# Patient Record
Sex: FEMALE | Race: WHITE | Employment: FULL TIME | URBAN - METROPOLITAN AREA
[De-identification: names, ages, dates, MRNs, and addresses within clinical notes are randomized per-mention and may not be internally consistent; named-entity substitution may affect disease eponyms.]

---

## 2017-02-13 ENCOUNTER — MYC MEDICAL ADVICE (OUTPATIENT)
Dept: FAMILY MEDICINE | Facility: CLINIC | Age: 32
End: 2017-02-13

## 2017-02-13 DIAGNOSIS — F41.9 ANXIETY: ICD-10-CM

## 2017-02-13 RX ORDER — LORAZEPAM 0.5 MG/1
0.5 TABLET ORAL EVERY 8 HOURS PRN
Qty: 15 TABLET | Refills: 0 | Status: SHIPPED | OUTPATIENT
Start: 2017-02-13 | End: 2017-08-29

## 2017-02-13 NOTE — TELEPHONE ENCOUNTER
JF,  Please advise on refill in SN's absence.  See below MyChart message.    Controlled Substance Refill Request for Ativan    Last refill: 9/13/2016 #15    Last clinic visit: 3/17/2016     Clinic visit frequency required: not documented  Next appt: none    Controlled substance agreement on file: No.    Documentation in problem list reviewed:  No, documentation started, needs to be completed    Processing:  [facility] Rx [facility] in [facility] pharmacy    RX monitoring program ([place]) reviewed:  reviewed- no concerns  [place] profile:  [email or web address]    Please authorize if appropriate.  Thanks,  Eve NOGUERA RN

## 2017-04-02 DIAGNOSIS — Z30.41 ENCOUNTER FOR SURVEILLANCE OF CONTRACEPTIVE PILLS: ICD-10-CM

## 2017-04-03 RX ORDER — ETONOGESTREL/ETHINYL ESTRADIOL .12-.015MG
RING, VAGINAL VAGINAL
Qty: 1 EACH | Refills: 0 | Status: SHIPPED | OUTPATIENT
Start: 2017-04-03 | End: 2017-08-29

## 2017-04-03 NOTE — TELEPHONE ENCOUNTER
Medication is being filled for 1 time refill only due to:  Patient needs to be seen because it has been more than one year since last visit.   Due for physical.  Last seen 3/17/16.  Kimber Bryant RN

## 2017-04-03 NOTE — TELEPHONE ENCOUNTER
Nuvaring       Last Written Prescription Date: 03/17/2016  Last Fill Quantity: 3 each,  # refills: 4   Last Office Visit with G, [facility] or [facility] prescribing provider: 03/17/2016

## 2017-08-07 ENCOUNTER — MYC MEDICAL ADVICE (OUTPATIENT)
Dept: FAMILY MEDICINE | Facility: CLINIC | Age: 32
End: 2017-08-07

## 2017-08-09 ENCOUNTER — OFFICE VISIT (OUTPATIENT)
Dept: FAMILY MEDICINE | Facility: CLINIC | Age: 32
End: 2017-08-09
Payer: COMMERCIAL

## 2017-08-09 ENCOUNTER — MYC MEDICAL ADVICE (OUTPATIENT)
Dept: FAMILY MEDICINE | Facility: CLINIC | Age: 32
End: 2017-08-09

## 2017-08-09 VITALS
HEART RATE: 85 BPM | RESPIRATION RATE: 18 BRPM | BODY MASS INDEX: 30.39 KG/M2 | SYSTOLIC BLOOD PRESSURE: 132 MMHG | HEIGHT: 64 IN | TEMPERATURE: 98 F | WEIGHT: 178 LBS | DIASTOLIC BLOOD PRESSURE: 86 MMHG | OXYGEN SATURATION: 99 %

## 2017-08-09 DIAGNOSIS — Z32.00 PREGNANCY EXAMINATION OR TEST, PREGNANCY UNCONFIRMED: Primary | ICD-10-CM

## 2017-08-09 DIAGNOSIS — Z32.01 PREGNANCY TEST POSITIVE: ICD-10-CM

## 2017-08-09 LAB — BETA HCG QUAL IFA URINE: POSITIVE

## 2017-08-09 PROCEDURE — 99214 OFFICE O/P EST MOD 30 MIN: CPT | Performed by: FAMILY MEDICINE

## 2017-08-09 PROCEDURE — 84703 CHORIONIC GONADOTROPIN ASSAY: CPT | Performed by: FAMILY MEDICINE

## 2017-08-09 NOTE — NURSING NOTE
"Chief Complaint   Patient presents with     Confirmation Of Pregnancy       Initial /86 (BP Location: Right arm, Patient Position: Chair, Cuff Size: Adult Regular)  Pulse 85  Temp 98  F (36.7  C) (Oral)  Resp 18  Ht 5' 4\" (1.626 m)  Wt 178 lb (80.7 kg)  LMP 07/03/2017  SpO2 99%  BMI 30.55 kg/m2 Estimated body mass index is 30.55 kg/(m^2) as calculated from the following:    Height as of this encounter: 5' 4\" (1.626 m).    Weight as of this encounter: 178 lb (80.7 kg).  Medication Reconciliation: complete   Hermila Liang CMA (AAMA)      "

## 2017-08-09 NOTE — PATIENT INSTRUCTIONS
Ruthie Acosta or any of the midwives through [facility] - [phone]    Schedule first OB visit at 10 weeks

## 2017-08-09 NOTE — PROGRESS NOTES
SUBJECTIVE:                                                    Yris Johnson is a 31 year old female who presents to clinic today for the following health issues:    Chief Complaint   Patient presents with     Confirmation Of Pregnancy     5 positive HPT     Has sure LMP  5+1 wga  CEM  4/10/2018    Feels ok - some nausea  No pain or bleeding  Slight cramps noted but mild  First pregnancy!  Excited  Interested in working with midwives  Taking PNV        Problem list and histories reviewed & adjusted, as indicated.  Additional history: as documented    Patient Active Problem List   Diagnosis     CARDIOVASCULAR SCREENING; LDL GOAL LESS THAN 160     Anxiety     Encounter for surveillance of contraceptive pills     GLENNA II (cervical intraepithelial neoplasia II)     Pregnancy test positive     Past Surgical History:   Procedure Laterality Date     HC TOOTH EXTRACTION W/FORCEP         Social History   Substance Use Topics     Smoking status: Never Smoker     Smokeless tobacco: Never Used     Alcohol use Yes      Comment: Once a week     Family History   Problem Relation Age of Onset     Asthma Father      DIABETES Paternal Grandfather      CEREBROVASCULAR DISEASE Paternal Grandfather      Asthma Sister      CANCER Paternal Aunt      CANCER Paternal Aunt          Current Outpatient Prescriptions   Medication Sig Dispense Refill     MAGNESIUM OXIDE PO        Prenatal MV-Min-Fe Fum-FA-DHA (PRENATAL 1 PO)        Cholecalciferol (VITAMIN D PO)        NUVARING 0.12-0.015 MG/24HR vaginal ring INSERT ONE RING VAGINALLY FOR THREE WEEKS AS DIRECTED. REMOVE AFTER ONE WEEK AND THEN REPEAT. (Patient not taking: Reported on 8/9/2017) 1 each 0     LORazepam (ATIVAN) 0.5 MG tablet Take 1 tablet (0.5 mg) by mouth every 8 hours as needed for anxiety (Patient not taking: Reported on 8/9/2017) 15 tablet 0     BP Readings from Last 3 Encounters:   08/09/17 132/86   03/17/16 116/88   03/05/15 120/86    Wt Readings from Last 3 Encounters:  "  08/09/17 178 lb (80.7 kg)   03/17/16 179 lb (81.2 kg)   03/05/15 181 lb (82.1 kg)                  Labs reviewed in EPIC        ROS:  Constitutional, HEENT, cardiovascular, pulmonary, gi and gu systems are negative, except as otherwise noted.      OBJECTIVE:                                                    /86 (BP Location: Right arm, Patient Position: Chair, Cuff Size: Adult Regular)  Pulse 85  Temp 98  F (36.7  C) (Oral)  Resp 18  Ht 5' 4\" (1.626 m)  Wt 178 lb (80.7 kg)  LMP 07/03/2017  SpO2 99%  BMI 30.55 kg/m2  Body mass index is 30.55 kg/(m^2).  GENERAL APPEARANCE: healthy, alert and no distress    Diagnostic test results:  Diagnostic Test Results:  Results for orders placed or performed in visit on 08/09/17 (from the past 24 hour(s))   Beta HCG qual IFA urine   Result Value Ref Range    Beta HCG Qual IFA Urine Positive (A) NEG          ASSESSMENT/PLAN:                                                    1. Pregnancy examination or test, pregnancy unconfirmed  reviewed PNC  Establishing care with CNMs  Can see me for co management if she desires  May look into out of clinic providers for delivery at birthing aris - encouraged her to make this decision soon and transfer care soon if this is what she would like  - Beta HCG qual IFA urine    2. Pregnancy test positive  reviewed PNC packet and signs and symptoms of warning      Follow up with Provider - first OB visit with Cnms     Kenia Witt MD  [facility]  "

## 2017-08-16 ENCOUNTER — TELEPHONE (OUTPATIENT)
Dept: MIDWIFE SERVICES | Facility: CLINIC | Age: 32
End: 2017-08-16

## 2017-08-16 ENCOUNTER — NURSE TRIAGE (OUTPATIENT)
Dept: NURSING | Facility: CLINIC | Age: 32
End: 2017-08-16

## 2017-08-16 NOTE — TELEPHONE ENCOUNTER
Patient is 6w2d, c/o spotting. Pt states that she was straining with BM last night and had some bright red spotting. This morning had some brown spotting when wiping. Discussed with patient most likely from straining with the BM. Discussed with patient to monitor for now and when to present to ED. Routing to on-call CNM as FYI. Any other recommendations? Thanks.   Maddy Quesada, RN-BSN

## 2017-08-16 NOTE — TELEPHONE ENCOUNTER
Reason for Disposition    [1] MODERATE vaginal bleeding (i.e., soaking 1 pad / hour; clots) AND [2] pregnant > 12 weeks    Additional Information    Negative: Followed an abdomen (stomach) injury    Negative: [1] Abdominal pain AND [2] pregnant > 20 weeks    Negative: MODERATE-SEVERE abdominal pain (e.g., interferes with normal activities, awakens from sleep)    Negative: [1] SEVERE vaginal bleeding (i.e., soaking 2 pads / hour, large blood clots) AND [2] present 2 or more hours    Negative: [1] MODERATE vaginal bleeding (i.e., soaking 1 pad / hour; clots) AND [2] present > 6 hours    Protocols used: PREGNANCY - ABDOMINAL PAIN LESS THAN 20 WEEKS EGA-ADULT-AH  No abdominal pain.

## 2017-08-16 NOTE — TELEPHONE ENCOUNTER
Yes, she can monitor for now. If she has more spotting or any cramping we can get beta HCG levels to monitor for pregnancy growth. Let me know if she needs anything else! Thanks! Danita

## 2017-08-17 ENCOUNTER — MYC MEDICAL ADVICE (OUTPATIENT)
Dept: FAMILY MEDICINE | Facility: CLINIC | Age: 32
End: 2017-08-17

## 2017-08-29 ENCOUNTER — PRENATAL OFFICE VISIT (OUTPATIENT)
Dept: NURSING | Facility: CLINIC | Age: 32
End: 2017-08-29
Payer: COMMERCIAL

## 2017-08-29 VITALS
TEMPERATURE: 98.1 F | BODY MASS INDEX: 29.02 KG/M2 | SYSTOLIC BLOOD PRESSURE: 133 MMHG | HEIGHT: 64 IN | DIASTOLIC BLOOD PRESSURE: 92 MMHG | HEART RATE: 118 BPM | WEIGHT: 170 LBS

## 2017-08-29 DIAGNOSIS — Z34.90 SUPERVISION OF NORMAL PREGNANCY: Primary | ICD-10-CM

## 2017-08-29 DIAGNOSIS — O26.859 SPOTTING IN PREGNANCY: ICD-10-CM

## 2017-08-29 DIAGNOSIS — Z86.59 HISTORY OF EATING DISORDER: ICD-10-CM

## 2017-08-29 LAB
ABO + RH BLD: NORMAL
ABO + RH BLD: NORMAL
ALBUMIN UR-MCNC: NEGATIVE MG/DL
APPEARANCE UR: CLEAR
BILIRUB UR QL STRIP: NEGATIVE
BLD GP AB SCN SERPL QL: NORMAL
BLOOD BANK CMNT PATIENT-IMP: NORMAL
COLOR UR AUTO: YELLOW
ERYTHROCYTE [DISTWIDTH] IN BLOOD BY AUTOMATED COUNT: 12 % (ref 10–15)
GLUCOSE UR STRIP-MCNC: NEGATIVE MG/DL
HBV SURFACE AG SERPL QL IA: NONREACTIVE
HCT VFR BLD AUTO: 40.4 % (ref 35–47)
HGB BLD-MCNC: 14.2 G/DL (ref 11.7–15.7)
HGB UR QL STRIP: NEGATIVE
HIV 1+2 AB+HIV1 P24 AG SERPL QL IA: NONREACTIVE
KETONES UR STRIP-MCNC: NEGATIVE MG/DL
LEUKOCYTE ESTERASE UR QL STRIP: NEGATIVE
MCH RBC QN AUTO: 30.6 PG (ref 26.5–33)
MCHC RBC AUTO-ENTMCNC: 35.1 G/DL (ref 31.5–36.5)
MCV RBC AUTO: 87 FL (ref 78–100)
NITRATE UR QL: NEGATIVE
PH UR STRIP: 5.5 PH (ref 5–7)
PLATELET # BLD AUTO: 243 10E9/L (ref 150–450)
RBC # BLD AUTO: 4.64 10E12/L (ref 3.8–5.2)
SOURCE: NORMAL
SP GR UR STRIP: 1.02 (ref 1–1.03)
SPECIMEN EXP DATE BLD: NORMAL
UROBILINOGEN UR STRIP-ACNC: 0.2 EU/DL (ref 0.2–1)
WBC # BLD AUTO: 5.7 10E9/L (ref 4–11)

## 2017-08-29 PROCEDURE — 86900 BLOOD TYPING SEROLOGIC ABO: CPT | Performed by: ADVANCED PRACTICE MIDWIFE

## 2017-08-29 PROCEDURE — 87389 HIV-1 AG W/HIV-1&-2 AB AG IA: CPT | Performed by: ADVANCED PRACTICE MIDWIFE

## 2017-08-29 PROCEDURE — 86850 RBC ANTIBODY SCREEN: CPT | Performed by: ADVANCED PRACTICE MIDWIFE

## 2017-08-29 PROCEDURE — 86901 BLOOD TYPING SEROLOGIC RH(D): CPT | Performed by: ADVANCED PRACTICE MIDWIFE

## 2017-08-29 PROCEDURE — 86762 RUBELLA ANTIBODY: CPT | Performed by: ADVANCED PRACTICE MIDWIFE

## 2017-08-29 PROCEDURE — 36415 COLL VENOUS BLD VENIPUNCTURE: CPT | Performed by: ADVANCED PRACTICE MIDWIFE

## 2017-08-29 PROCEDURE — 99207 ZZC NO CHARGE NURSE ONLY: CPT

## 2017-08-29 PROCEDURE — 81003 URINALYSIS AUTO W/O SCOPE: CPT | Performed by: ADVANCED PRACTICE MIDWIFE

## 2017-08-29 PROCEDURE — 87086 URINE CULTURE/COLONY COUNT: CPT | Performed by: ADVANCED PRACTICE MIDWIFE

## 2017-08-29 PROCEDURE — 87340 HEPATITIS B SURFACE AG IA: CPT | Performed by: ADVANCED PRACTICE MIDWIFE

## 2017-08-29 PROCEDURE — 86780 TREPONEMA PALLIDUM: CPT | Performed by: ADVANCED PRACTICE MIDWIFE

## 2017-08-29 PROCEDURE — 85027 COMPLETE CBC AUTOMATED: CPT | Performed by: ADVANCED PRACTICE MIDWIFE

## 2017-08-29 RX ORDER — LORAZEPAM 0.5 MG/1
TABLET ORAL
Refills: 0 | COMMUNITY
Start: 2017-02-14 | End: 2017-08-29

## 2017-08-29 NOTE — MR AVS SNAPSHOT
After Visit Summary   8/29/2017    Yris Johnson    MRN: [id number]           Patient Information     Date Of Birth          1985        Visit Information        Provider Department      8/29/2017 8:15 AM RD OB NURSE EDUCATION [facility]        Today's Diagnoses     Supervision of normal pregnancy    -  1    Spotting in pregnancy           Follow-ups after your visit        Your next 10 appointments already scheduled     Sep 06, 2017  2:20 PM CDT   US OB < 14 WEEKS SINGLE with RDUS1   [facility]    [address] [place] [id number] [phone]           Please bring a list of your medicines (including vitamins, minerals and over-the-counter drugs). Also, tell your doctor about any allergies you may have. Wear comfortable clothes and leave your valuables at home.  If you re less than 20 weeks drink four 8-ounce glasses of fluid an hour before your exam. If you need to empty your bladder before your exam, try to release only a little urine. Then, drink another glass of fluid.  You may have up to two family members in the exam room. If you bring a small child, an adult must be there to care for him or her.  Please call the Imaging Department at your exam site with any questions.            Sep 06, 2017  3:00 PM CDT   SHORT with MEME Nolasco CNM   [facility]    [address] [place] [id number] [phone]            Sep 19, 2017  3:15 PM CDT   New Prenatal with MEME Corral CNM   [facility]    [address] [place] [id number] [phone]              Future tests that were ordered for you today     Open Future Orders        Priority Expected Expires Ordered    US OB < 14 weeks, single,  for dating (GQL444) Routine  11/27/2017 8/29/2017           "  Who to contact     If you have questions or need follow up information about today's clinic visit or your schedule please contact [facility] directly at [phone].  Normal or non-critical lab and imaging results will be communicated to you by Skiipihart, letter or phone within 4 business days after the clinic has received the results. If you do not hear from us within 7 days, please contact the clinic through Skiipihart or phone. If you have a critical or abnormal lab result, we will notify you by phone as soon as possible.  Submit refill requests through [facility] or call your pharmacy and they will forward the refill request to us. Please allow 3 business days for your refill to be completed.          Additional Information About Your Visit        [facility] Information     [facility] gives you secure access to your electronic health record. If you see a primary care provider, you can also send messages to your care team and make appointments. If you have questions, please call your primary care clinic.  If you do not have a primary care provider, please call [phone] and they will assist you.        Care EveryWhere ID     This is your Care EveryWhere ID. This could be used by other organizations to access your [facility] medical records  [id number]        Your Vitals Were     Pulse Temperature Height Last Period BMI (Body Mass Index)       118 98.1  F (36.7  C) 5' 4\" (1.626 m) 07/03/2017 29.18 kg/m2        Blood Pressure from Last 3 Encounters:   08/29/17 (!) 133/92   08/09/17 132/86   03/17/16 116/88    Weight from Last 3 Encounters:   08/29/17 170 lb (77.1 kg)   08/09/17 178 lb (80.7 kg)   03/17/16 179 lb (81.2 kg)              We Performed the Following     ABO/RH Type and Screen     Anti Treponema     CBC with Platelets     Hepatitis B surface antigen     HIV Antigen Antibody Combo     Rubella Antibody IgG Quantitative     UA without Microscopic     Urine Culture Aerobic Bacterial        Primary " Care Provider Office Phone # Fax #    [place] [phone]       [address] [address]        Equal Access to Services     JUAN JOSE MARQUEZ : Hadii aad ku hadanjalio Socristinaali, waaxda luqadaha, qaybta kaalmada adeegyada, phani lucasclemente maurerelizabeth vega ro alicea. So [facility] [phone].    ATENCIÓN: Si habla español, tiene a schulz disposición servicios gratuitos de asistencia lingüística. Llame al [phone].    We comply with applicable federal civil rights laws and [place] laws. We do not discriminate on the basis of race, color, national origin, age, disability sex, sexual orientation or gender identity.            Thank you!     Thank you for choosing [facility]  for your care. Our goal is always to provide you with excellent care. Hearing back from our patients is one way we can continue to improve our services. Please take a few minutes to complete the written survey that you may receive in the mail after your visit with us. Thank you!             Your Updated Medication List - Protect others around you: Learn how to safely use, store and throw away your medicines at www.disposemymeds.org.          This list is accurate as of: 8/29/17  9:12 AM.  Always use your most recent med list.                   Brand Name Dispense Instructions for use Diagnosis    MAGNESIUM OXIDE PO           PRENATAL 1 PO
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
## 2017-08-30 LAB
BACTERIA SPEC CULT: NORMAL
RUBV IGG SERPL IA-ACNC: 25 IU/ML
SPECIMEN SOURCE: NORMAL
T PALLIDUM IGG+IGM SER QL: NEGATIVE

## 2017-09-06 ENCOUNTER — RADIANT APPOINTMENT (OUTPATIENT)
Dept: ULTRASOUND IMAGING | Facility: CLINIC | Age: 32
End: 2017-09-06
Attending: ADVANCED PRACTICE MIDWIFE
Payer: COMMERCIAL

## 2017-09-06 ENCOUNTER — OFFICE VISIT (OUTPATIENT)
Dept: MIDWIFE SERVICES | Facility: CLINIC | Age: 32
End: 2017-09-06
Attending: ADVANCED PRACTICE MIDWIFE
Payer: COMMERCIAL

## 2017-09-06 VITALS
BODY MASS INDEX: 30.04 KG/M2 | WEIGHT: 175 LBS | HEART RATE: 100 BPM | SYSTOLIC BLOOD PRESSURE: 133 MMHG | TEMPERATURE: 97.5 F | DIASTOLIC BLOOD PRESSURE: 88 MMHG

## 2017-09-06 DIAGNOSIS — Z34.01 ENCOUNTER FOR SUPERVISION OF NORMAL FIRST PREGNANCY IN FIRST TRIMESTER: Primary | ICD-10-CM

## 2017-09-06 DIAGNOSIS — Z34.90 SUPERVISION OF NORMAL PREGNANCY: ICD-10-CM

## 2017-09-06 PROCEDURE — 99207 ZZC PRENATAL VISIT: CPT | Performed by: ADVANCED PRACTICE MIDWIFE

## 2017-09-06 PROCEDURE — 76815 OB US LIMITED FETUS(S): CPT | Performed by: OBSTETRICS & GYNECOLOGY

## 2017-09-06 NOTE — NURSING NOTE
"Chief Complaint   Patient presents with     Prenatal Care       Initial /88  Pulse 100  Temp 97.5  F (36.4  C) (Oral)  Wt 175 lb (79.4 kg)  LMP 2017  BMI 30.04 kg/m2 Estimated body mass index is 30.04 kg/(m^2) as calculated from the following:    Height as of 17: 5' 4\" (1.626 m).    Weight as of this encounter: 175 lb (79.4 kg).  BP completed using cuff size: regular        The following HM Due: NONE      The following patient reported/Care Every where data was sent to:  P ABSTRACT QUALITY INITIATIVES [11762]  na     n/a             "

## 2017-09-06 NOTE — MR AVS SNAPSHOT
After Visit Summary   9/6/2017    Yris Johnson    MRN: [id number]           Patient Information     Date Of Birth          1985        Visit Information        Provider Department      9/6/2017 3:00 PM Jo Garrido APRN CNM [facility]        Today's Diagnoses     Encounter for supervision of normal first pregnancy in first trimester    -  1       Follow-ups after your visit        Your next 10 appointments already scheduled     Sep 19, 2017  3:15 PM CDT   New Prenatal with EMME Corral CNM   [facility] ([facility])    [address] [place] [address] [phone]              Who to contact     If you have questions or need follow up information about today's clinic visit or your schedule please contact [facility] directly at [phone].  Normal or non-critical lab and imaging results will be communicated to you by MyChart, letter or phone within 4 business days after the clinic has received the results. If you do not hear from us within 7 days, please contact the clinic through [employer] or phone. If you have a critical or abnormal lab result, we will notify you by phone as soon as possible.  Submit refill requests through [email or web address] or call your pharmacy and they will forward the refill request to us. Please allow 3 business days for your refill to be completed.          Additional Information About Your Visit        MyChart Information     [email or web address] gives you secure access to your electronic health record. If you see a primary care provider, you can also send messages to your care team and make appointments. If you have questions, please call your primary care clinic.  If you do not have a primary care provider, please call [phone] and they will assist you.        Care EveryWhere ID     This is your Care EveryWhere ID. This could be used by other organizations to access your  [place] medical records  [id number]        Your Vitals Were     Pulse Temperature Last Period BMI (Body Mass Index)          100 97.5  F (36.4  C) (Oral) 07/03/2017 30.04 kg/m2         Blood Pressure from Last 3 Encounters:   09/06/17 133/88   08/29/17 (!) 133/92   08/09/17 132/86    Weight from Last 3 Encounters:   09/06/17 175 lb (79.4 kg)   08/29/17 170 lb (77.1 kg)   08/09/17 178 lb (80.7 kg)              Today, you had the following     No orders found for display       Primary Care Provider Office Phone # Fax #    Kenia Witt [phone]       [address] [address]        Equal Access to Services     [facility] JIMMY : Hadii dana Harmon, waaxelana weinre, qaybta kaalmaelana ashraf, phani starr . So [facility] [phone].    ATENCIÓN: Si habla español, tiene a schulz disposición servicios gratuitos de asistencia lingüística. Deanna al [phone].    We comply with applicable federal civil rights laws and [place] laws. We do not discriminate on the basis of race, color, national origin, age, disability sex, sexual orientation or gender identity.            Thank you!     Thank you for choosing [facility]  for your care. Our goal is always to provide you with excellent care. Hearing back from our patients is one way we can continue to improve our services. Please take a few minutes to complete the written survey that you may receive in the mail after your visit with us. Thank you!             Your Updated Medication List - Protect others around you: Learn how to safely use, store and throw away your medicines at www.disposemymeds.org.          This list is accurate as of: 9/6/17  4:33 PM.  Always use your most recent med list.                   Brand Name Dispense Instructions for use Diagnosis    MAGNESIUM OXIDE PO           PRENATAL 1 PO

## 2017-09-10 ENCOUNTER — NURSE TRIAGE (OUTPATIENT)
Dept: NURSING | Facility: CLINIC | Age: 32
End: 2017-09-10

## 2017-09-10 NOTE — TELEPHONE ENCOUNTER
"  Additional Information    Negative: Shock suspected (e.g., cold/pale/clammy skin, too weak to stand, low BP, rapid pulse)    Negative: Difficult to awaken or acting confused  (e.g., disoriented, slurred speech)    Negative: Passed out (i.e., lost consciousness, collapsed and was not responding)    Negative: Sounds like a life-threatening emergency to the triager    Negative: [1] Vaginal bleeding AND [2] pregnant > 20 weeks    Negative: Not pregnant or pregnancy status unknown    Negative: SEVERE abdominal pain    Negative: [1] SEVERE vaginal bleeding (i.e., soaking 2 pads / hour, large blood clots) AND [2] present 2 or more hours    Negative: [1] MODERATE vaginal bleeding (i.e., soaking 1 pad / hour; clots) AND [2] present > 6 hours    Negative: [1] MODERATE vaginal bleeding (i.e., soaking 1 pad / hour; clots) AND [2] pregnant > 12 weeks    Negative: Passed tissue (e.g., gray-white)    Negative: Shoulder pain    Negative: Pale skin (pallor) of new onset or worsening    Negative: Patient sounds very sick or weak to the triager    Negative: [1] Constant abdominal pain AND [2] present > 2 hours    Negative: Fever > 100.4 F (38.0 C)    Negative: [1] Intermittent lower abdominal pain (e.g., cramping) AND [2] present > 24 hours    Negative: Prior history of \"ectopic pregnancy\" or previous tubal surgery (e.g., tubal ligation)    Negative: Burning with urination    Negative: Has IUD    Negative: MILD vaginal bleeding (i.e., clots or similar to menstrual period; not just spotting)    Negative: SPOTTING lasts > 48 hours or spotting happens more than once in a week    Negative: Unusual vaginal discharge (e.g., bad smelling, yellow, green, or foamy-white)    Negative: Not feeling pregnant any longer (e.g., breast tenderness or nausea has disappeared)    Negative: SPOTTING after sexual intercourse (single or brief episode) (all triage questions negative)    Negative: SPOTTING after a pelvic examination (single or brief " "episode) (all triage questions negative)    SPOTTING (single or brief episode) (all triage questions negative)     \"I just noticed after I went to the bath room(after a bowel movement) there was some dark brown blood(size of a quarter) when I wiped. NO pain or anything. I had my US last week and things were good.\" Denies other sx. Triaged, gave home care advice, call back if needed. Pt. Is leaving [facility] to go out of the [place] for one week.    Protocols used: PREGNANCY - VAGINAL BLEEDING LESS THAN 20 WEEKS [id number]    "

## 2017-09-19 ENCOUNTER — PRENATAL OFFICE VISIT (OUTPATIENT)
Dept: MIDWIFE SERVICES | Facility: CLINIC | Age: 32
End: 2017-09-19
Payer: COMMERCIAL

## 2017-09-19 VITALS
BODY MASS INDEX: 29.7 KG/M2 | SYSTOLIC BLOOD PRESSURE: 130 MMHG | HEART RATE: 87 BPM | TEMPERATURE: 98 F | DIASTOLIC BLOOD PRESSURE: 86 MMHG | WEIGHT: 173 LBS

## 2017-09-19 DIAGNOSIS — Z34.01 ENCOUNTER FOR SUPERVISION OF NORMAL FIRST PREGNANCY IN FIRST TRIMESTER: Primary | ICD-10-CM

## 2017-09-19 PROCEDURE — 99207 ZZC FIRST OB VISIT: CPT | Performed by: ADVANCED PRACTICE MIDWIFE

## 2017-09-19 NOTE — MR AVS SNAPSHOT
After Visit Summary   9/19/2017    Yris Johnson    MRN: [id number]           Patient Information     Date Of Birth          1985        Visit Information        Provider Department      9/19/2017 3:15 PM Ary Izquierdo APRN CNM [facility]        Today's Diagnoses     Encounter for supervision of normal first pregnancy in first trimester    -  1       Follow-ups after your visit        Your next 10 appointments already scheduled     Oct 18, 2017 10:45 AM CDT   ESTABLISHED PRENATAL with MEME Anderson CNM   [facility] ([facility])    [address] [place] [address] [phone]              Who to contact     If you have questions or need follow up information about today's clinic visit or your schedule please contact [facility] directly at [phone].  Normal or non-critical lab and imaging results will be communicated to you by MyChart, letter or phone within 4 business days after the clinic has received the results. If you do not hear from us within 7 days, please contact the clinic through [employer] or phone. If you have a critical or abnormal lab result, we will notify you by phone as soon as possible.  Submit refill requests through [employer] or call your pharmacy and they will forward the refill request to us. Please allow 3 business days for your refill to be completed.          Additional Information About Your Visit        MyChart Information     [employer] gives you secure access to your electronic health record. If you see a primary care provider, you can also send messages to your care team and make appointments. If you have questions, please call your primary care clinic.  If you do not have a primary care provider, please call [phone] and they will assist you.        Care EveryWhere ID     This is your Care EveryWhere ID. This could be used by other organizations to access  your [place] medical records  [id number]        Your Vitals Were     Pulse Temperature Last Period BMI (Body Mass Index)          87 98  F (36.7  C) (Oral) 07/03/2017 29.7 kg/m2         Blood Pressure from Last 3 Encounters:   09/19/17 130/86   09/06/17 133/88   08/29/17 (!) 133/92    Weight from Last 3 Encounters:   09/19/17 173 lb (78.5 kg)   09/06/17 175 lb (79.4 kg)   08/29/17 170 lb (77.1 kg)              Today, you had the following     No orders found for display       Primary Care Provider Office Phone # Fax #    Kenia Witt [phone]       [address] [address]        Equal Access to Services     JUAN JOSE MARQUEZ : Hadii dana walter Sojaqueline, waaxda luqadaha, qaybta kaalmada andriy, phani starr . So [facility] [phone].    ATENCIÓN: Si habla español, tiene a schulz disposición servicios gratuitos de asistencia lingüística. Deanna al [phone].    We comply with applicable federal civil rights laws and [place] laws. We do not discriminate on the basis of race, color, national origin, age, disability sex, sexual orientation or gender identity.            Thank you!     Thank you for choosing [facility]  for your care. Our goal is always to provide you with excellent care. Hearing back from our patients is one way we can continue to improve our services. Please take a few minutes to complete the written survey that you may receive in the mail after your visit with us. Thank you!             Your Updated Medication List - Protect others around you: Learn how to safely use, store and throw away your medicines at www.disposemymeds.org.          This list is accurate as of: 9/19/17  4:39 PM.  Always use your most recent med list.                   Brand Name Dispense Instructions for use Diagnosis    MAGNESIUM OXIDE PO           PRENATAL 1 PO

## 2017-09-19 NOTE — NURSING NOTE
"Chief Complaint   Patient presents with     Prenatal Care       Initial /86  Pulse 87  Temp 98  F (36.7  C) (Oral)  Wt 173 lb (78.5 kg)  LMP 2017  BMI 29.7 kg/m2 Estimated body mass index is 29.7 kg/(m^2) as calculated from the following:    Height as of 17: 5' 4\" (1.626 m).    Weight as of this encounter: 173 lb (78.5 kg).  BP completed using cuff size: regular        The following HM Due: NONE      The following patient reported/Care Every where data was sent to:  P ABSTRACT QUALITY INITIATIVES [35336]  na     n/a             "

## 2017-09-19 NOTE — PROGRESS NOTES
Patient here with  for NOB visit. Just returned from [place], had some brown spotting while there. Anxious to hear FHTs, very relieved and excited when we were able to hear them. Reviewed CNM service, schedule of visits. Not interested in SW. Lots of questions about activity - reviewed precautions. Elevated BP at first visit, patient will be monitoring at work. Due for pap but declined in light of spotting. Will complete at PP visit. RTC in 4 weeks. MML    11w1d   Yris Johnson is a 32 year old who presents to the clinic for an new ob visit. She is not a previous CNM patient.  Estimated Date of Delivery: Apr 9, 2018 is calculated from Patient's last menstrual period was 07/03/2017.     She has had bleeding since her LMP.  The bleeding  was brown, in small, on a few occasions, and was not accompanied by cramping...   She has not had nausea. Weigh loss has not occurred.   This was a planned pregnancy.   FOB is involved,  Present and supportive   OTHER CONCERNS:    INFECTION HISTORY  HIV: no  Hepatitis B: no  Hepatitis C: no  Syphilis:  no  Tuberculosis: no   PPD- no  Herpes self: no  Herpes partner:  no  Chlamydia:  no  Gonorrhea:  no  HPV: no  BV:  no  Trichomonis:  no  Chicken Pox:  YES  ====================================================  GENETIC SCREENING  Genetic screening reviewed. High Risk? no  ====================================================  PERSONAL/SOCIAL HISTORY  Lives lives with their spouse.  Employment: Full time.  Her job involves light activity .  HX OF ABUSE: no  =====================================================   REVIEW OF SYSTEMS  C: NEGATIVE for fever, chills  E: NEGATIVE for vision changes   R: NEGATIVE for significant cough or SOB  CV: NEGATIVE for chest pain, palpitations   GI: NEGATIVE for nausea, abdominal pain, heartburn, or change in bowel habits  : NEGATIVE for frequency, dysuria, or hematuria  M: NEGATIVE for significant arthralgias or myalgia  N: NEGATIVE for weakness,  dizziness or paresthesias or headache  ====================================================    PHYSICAL EXAM:  /86  Pulse 87  Temp 98  F (36.7  C) (Oral)  Wt 173 lb (78.5 kg)  LMP 2017  BMI 29.7 kg/m2  BMI- Body mass index is 29.7 kg/(m^2).,     RECOMMENDED WEIGHT GAIN: 15-25 lbs.  PHQ9- Today's Depression Rating was No Value exists for the : HP#PHQ9  GENERAL:  Pleasant pregnant female, alert, well groomed.   SKIN:  Warm and dry, without lesions or rashes  HEAD: Symmetrical features.  NECK:  Thyroid without enlargement and nodules.  Lymph nodes not palpable.   LUNGS:  Clear to auscultation.  HEART:  RRR without murmur.  ABDOMEN: Soft without masses , tenderness or organomegaly.  No CVA tenderness. No scars noted.     MUSCULOSKELETAL:  Full range of motion  EXTREMITIES:  No edema. No significant varicosities.   =========================================  ASSESSMENT:  11w1d  No diagnosis found.  ==========================================  PLAN:  Instructed on use of triage nurse line and contacting the on call CNM after hours for an urgent need such as fever, vagina bleeding, bladder or vaginal infection, rupture of membranes,  or term labor.    Discussed the indications, uses for and false positives for quad screen, nuchal translucency and fetal survey ultrasound at 18-20 weeks gestation. Will inform us at the next visit if she wished to avail herself of these screens.  Instructed on best evidence for: weight gain for her BMI for pregnancy; healthy diet and foods to avoid; exercise and activity during pregnancy;avoiding exposure to toxoplasmosis; and maintenance of a generally healthy lifestyle.   Discussed the harms, benefits, side effects and alternative therapies for current prescribed and OTC medications.  MEME Corral CNM

## 2017-10-09 DIAGNOSIS — Z34.92 SUPERVISION OF NORMAL PREGNANCY IN SECOND TRIMESTER: Primary | ICD-10-CM

## 2017-10-15 ENCOUNTER — HEALTH MAINTENANCE LETTER (OUTPATIENT)
Age: 32
End: 2017-10-15

## 2017-10-18 ENCOUNTER — PRENATAL OFFICE VISIT (OUTPATIENT)
Dept: MIDWIFE SERVICES | Facility: CLINIC | Age: 32
End: 2017-10-18
Payer: COMMERCIAL

## 2017-10-18 VITALS
BODY MASS INDEX: 30.04 KG/M2 | WEIGHT: 175 LBS | OXYGEN SATURATION: 98 % | TEMPERATURE: 97 F | SYSTOLIC BLOOD PRESSURE: 110 MMHG | HEART RATE: 94 BPM | DIASTOLIC BLOOD PRESSURE: 84 MMHG

## 2017-10-18 DIAGNOSIS — Z34.02 ENCOUNTER FOR SUPERVISION OF NORMAL FIRST PREGNANCY IN SECOND TRIMESTER: ICD-10-CM

## 2017-10-18 PROBLEM — Z32.01 PREGNANCY TEST POSITIVE: Status: RESOLVED | Noted: 2017-08-09 | Resolved: 2017-10-18

## 2017-10-18 PROCEDURE — 99207 ZZC PRENATAL VISIT: CPT | Performed by: ADVANCED PRACTICE MIDWIFE

## 2017-10-18 ASSESSMENT — PATIENT HEALTH QUESTIONNAIRE - PHQ9: SUM OF ALL RESPONSES TO PHQ QUESTIONS 1-9: 0

## 2017-10-18 NOTE — PROGRESS NOTES
15w2d  Doing well, good questions.   Has U./S scheduled already for 3 weeks.  Does not want to know weight.   PHQ9= 0.  Anxious. Hired : Laura.   name: Chris.   049 study.  rtc in 3 weeks with U/S.  mrb

## 2017-10-18 NOTE — MR AVS SNAPSHOT
After Visit Summary   10/18/2017    Yris Johnson    MRN: [id number]           Patient Information     Date Of Birth          1985        Visit Information        Provider Department      10/18/2017 10:45 AM Agnes Zhang APRN CNM [facility]        Today's Diagnoses     Encounter for supervision of normal first pregnancy in second trimester           Follow-ups after your visit        Your next 10 appointments already scheduled     Nov 06, 2017  7:40 AM CST   US OB > 14 WEEKS COMPLETE SINGLE with RDUS1   [facility] ([facility])    [address] [place] [address] [address]  [facility] [address] [phone]           Please bring a list of your medicines (including vitamins, minerals and over-the-counter drugs). Also, tell your doctor about any allergies you may have. Wear comfortable clothes and leave your valuables at home.  If you re less than 20 weeks drink four 8-ounce glasses of fluid an hour before your exam. If you need to empty your bladder before your exam, try to release only a little urine. Then, drink another glass of fluid.  You may have up to two family members in the exam room. If you bring a small child, an adult must be there to care for him or her.  Please call the Imaging Department at your exam site with any questions.            Nov 06, 2017  8:45 AM CST   ESTABLISHED PRENATAL with MEME Nolasco CNM   [facility] ([facility])    [address] [place] [address]  [address]  [facility] [address] [phone]              Who to contact     If you have questions or need follow up information about today's clinic visit or your schedule please contact [facility] directly at [phone].  Normal or non-critical lab and imaging results will be communicated to you by MyChart, letter or phone within 4 business days after the clinic has received the results. If you do not hear from  us within 7 days, please contact the clinic through [employer] or phone. If you have a critical or abnormal lab result, we will notify you by phone as soon as possible.  Submit refill requests through [employer] or call your pharmacy and they will forward the refill request to us. Please allow 3 business days for your refill to be completed.          Additional Information About Your Visit        [employer] Information     [employer] gives you secure access to your electronic health record. If you see a primary care provider, you can also send messages to your care team and make appointments. If you have questions, please call your primary care clinic.  If you do not have a primary care provider, please call [phone] and they will assist you.        Care EveryWhere ID     This is your Care EveryWhere ID. This could be used by other organizations to access your [place] medical records  [id number]        Your Vitals Were     Pulse Temperature Last Period Pulse Oximetry BMI (Body Mass Index)       94 97  F (36.1  C) (Oral) 07/03/2017 98% 30.04 kg/m2        Blood Pressure from Last 3 Encounters:   10/18/17 110/84   09/19/17 130/86   09/06/17 133/88    Weight from Last 3 Encounters:   10/18/17 175 lb (79.4 kg)   09/19/17 173 lb (78.5 kg)   09/06/17 175 lb (79.4 kg)              Today, you had the following     No orders found for display       Primary Care Provider Office Phone # Fax #    [place]ocquaBrooklynn Witt [phone]       [address] [address]        Equal Access to Services     [facility] : Hadii aad ku hadasho Soomaali, waaxda luqadaha, qaybta kaalmada andriy, waxay adriana starr . So [facility] [phone].    ATENCIÓN: Si habla español, tiene a schulz disposición servicios gratuitos de asistencia lingüística. Llame al [phone].    We comply with applicable federal civil rights laws and [place] laws. We do not discriminate on the basis of race, color,  national origin, age, disability, sex, sexual orientation, or gender identity.            Thank you!     Thank you for choosing [facility]  for your care. Our goal is always to provide you with excellent care. Hearing back from our patients is one way we can continue to improve our services. Please take a few minutes to complete the written survey that you may receive in the mail after your visit with us. Thank you!             Your Updated Medication List - Protect others around you: Learn how to safely use, store and throw away your medicines at www.disposemymeds.org.          This list is accurate as of: 10/18/17 11:10 AM.  Always use your most recent med list.                   Brand Name Dispense Instructions for use Diagnosis    MAGNESIUM OXIDE PO           PRENATAL 1 PO

## 2017-10-28 ENCOUNTER — TELEPHONE (OUTPATIENT)
Dept: OBGYN | Facility: CLINIC | Age: 32
End: 2017-10-28

## 2017-10-28 ENCOUNTER — NURSE TRIAGE (OUTPATIENT)
Dept: NURSING | Facility: CLINIC | Age: 32
End: 2017-10-28

## 2017-10-28 NOTE — TELEPHONE ENCOUNTER
Reason for Disposition    [1] SEVERE vomiting (e.g., 6 or more times/day) AND [2] present > 8 hours    Protocols used: VOMITING-ADULT-      Paged on call URSULA Patel via [employer] at 3:52 pm Call pt, Yris Johnson, [phone], [birth year], MRN [id number], 16 weeks pregnant, woke up with severe stomach flu with severe vomiting and diarrhea, declined ER disposition and requested page. Thanks! She is aware to call FNA back if no call in 20-30 minutes for repage or go to ER.    Radha Sosa, RN, BSN  [facility]

## 2017-10-28 NOTE — TELEPHONE ENCOUNTER
Call to pt has had watery diarrhea and hourly vomiting since about 8 am this morning.  Is unsure if she has been urinating with the stool or not, states she feel slike she can keep down very small sips of water, but if has more than a sip then vomits again.    Discuss warning s/s of dehydration, triggers for coming into the ER, if pt goes for 12 hours or more without being able to keep anything down, feels lightheaded, dizzy, or is still unable to tell if she has to urinate by about 8 pm then she should go to the ER.    Pt stated understanding and agreement with plan.    Alexsandra MAYBERRY

## 2017-11-06 ENCOUNTER — RADIANT APPOINTMENT (OUTPATIENT)
Dept: ULTRASOUND IMAGING | Facility: CLINIC | Age: 32
End: 2017-11-06
Attending: ADVANCED PRACTICE MIDWIFE
Payer: COMMERCIAL

## 2017-11-06 ENCOUNTER — PRE VISIT (OUTPATIENT)
Dept: MATERNAL FETAL MEDICINE | Facility: CLINIC | Age: 32
End: 2017-11-06

## 2017-11-06 ENCOUNTER — PRENATAL OFFICE VISIT (OUTPATIENT)
Dept: MIDWIFE SERVICES | Facility: CLINIC | Age: 32
End: 2017-11-06
Attending: ADVANCED PRACTICE MIDWIFE
Payer: COMMERCIAL

## 2017-11-06 VITALS
TEMPERATURE: 96.7 F | SYSTOLIC BLOOD PRESSURE: 120 MMHG | BODY MASS INDEX: 30.04 KG/M2 | HEART RATE: 113 BPM | WEIGHT: 175 LBS | DIASTOLIC BLOOD PRESSURE: 82 MMHG

## 2017-11-06 DIAGNOSIS — Z34.02 ENCOUNTER FOR SUPERVISION OF NORMAL FIRST PREGNANCY IN SECOND TRIMESTER: Primary | ICD-10-CM

## 2017-11-06 DIAGNOSIS — Z34.92 SUPERVISION OF NORMAL PREGNANCY IN SECOND TRIMESTER: ICD-10-CM

## 2017-11-06 PROCEDURE — 99207 ZZC PRENATAL VISIT: CPT | Performed by: ADVANCED PRACTICE MIDWIFE

## 2017-11-06 PROCEDURE — 76805 OB US >/= 14 WKS SNGL FETUS: CPT | Performed by: OBSTETRICS & GYNECOLOGY

## 2017-11-06 NOTE — MR AVS SNAPSHOT
After Visit Summary   11/6/2017    Yris Johnson    MRN: [id number]           Patient Information     Date Of Birth          1985        Visit Information        Provider Department      11/6/2017 8:45 AM Jo Garrido APRN CNM [facility]        Today's Diagnoses     Encounter for supervision of normal first pregnancy in second trimester    -  1       Follow-ups after your visit        Additional Services     MAT FETAL MED CTR REFERRAL-PREGNANCY       >> Patient may proceed with recommendations for further testing as directed by the Maternal Fetal Medicine Specialist >>    >> If requesting Fetal Echo: MFM will determine appropriate location for exam due to indication.    >> If requesting Lung Maturity Amnio:  If results indicate fetal lung maturity, induction or C/S is recommended within 36 hours.  Please schedule accordingly.     Dear Patient:   Please be aware that coverage of these services is subject to the terms and limitations of your health insurance plan.  Call member services at your health plan with any benefit or coverage questions.      Please bring the following to your appointment:    >>  Any x-rays, CTs or MRIs which have been performed.  Contact the facility where they were done to arrange for  prior to your scheduled appointment.  Any new CT, MRI or other procedures ordered by your specialist must be performed at a [place] facility or coordinated by your clinic's referral office.  >>  List of current medications   >>  This referral request   >>  Any documents/labs given to you for this referral                  Who to contact     If you have questions or need follow up information about today's clinic visit or your schedule please contact [facility] directly at [phone].  Normal or non-critical lab and imaging results will be communicated to you by MyChart, letter or phone within 4 business days after the clinic has received the  results. If you do not hear from us within 7 days, please contact the clinic through [employer] or phone. If you have a critical or abnormal lab result, we will notify you by phone as soon as possible.  Submit refill requests through [employer] or call your pharmacy and they will forward the refill request to us. Please allow 3 business days for your refill to be completed.          Additional Information About Your Visit        [employer] Information     [employer] gives you secure access to your electronic health record. If you see a primary care provider, you can also send messages to your care team and make appointments. If you have questions, please call your primary care clinic.  If you do not have a primary care provider, please call [phone] and they will assist you.        Care EveryWhere ID     This is your Care EveryWhere ID. This could be used by other organizations to access your [place] medical records  [id number]        Your Vitals Were     Pulse Temperature Last Period BMI (Body Mass Index)          113 96.7  F (35.9  C) (Oral) 07/03/2017 30.04 kg/m2         Blood Pressure from Last 3 Encounters:   11/06/17 120/82   10/18/17 110/84   09/19/17 130/86    Weight from Last 3 Encounters:   11/06/17 175 lb (79.4 kg)   10/18/17 175 lb (79.4 kg)   09/19/17 173 lb (78.5 kg)              We Performed the Following     MAT FETAL MED CTR REFERRAL-PREGNANCY        Primary Care Provider Office Phone # Fax #    [place]Bro[place]lynn Witt [phone] [address] [address]        Equal Access to Services     : Hadii aad herlinda hadashab Sojaqueline, waaxda luqadaha, qaybta kaalmaphani mendiola . So [facility] [phone].    ATENCIÓN: Si habla español, tiene a schulz disposición servicios gratuitos de asistencia lingüística. Llame al [phone].    We comply with applicable federal civil rights laws and [place] laws. We do not discriminate  on the basis of race, color, national origin, age, disability, sex, sexual orientation, or gender identity.            Thank you!     Thank you for choosing [facility]  for your care. Our goal is always to provide you with excellent care. Hearing back from our patients is one way we can continue to improve our services. Please take a few minutes to complete the written survey that you may receive in the mail after your visit with us. Thank you!             Your Updated Medication List - Protect others around you: Learn how to safely use, store and throw away your medicines at www.disposemymeds.org.          This list is accurate as of: 11/6/17  9:06 AM.  Always use your most recent med list.                   Brand Name Dispense Instructions for use Diagnosis    MAGNESIUM OXIDE PO           PRENATAL 1 PO

## 2017-11-06 NOTE — PROGRESS NOTES
18w0d  Patient feeling well. Denies any vaginal bleeding, water leaking, abdominal pain, or other concerns.   Discussed genetic testing. Discussed fetal survey ultrasound today, showed right choroid plexus cyst and low lying placenta. Ordered [facility] ultrasound follow up.   Flu vaccination today.   Danger signs discussed. Patient knows when to call triage and has numbers to call.   Follow up in 6 weeks for GCT and HGB.   Jo Garrido CNM

## 2017-11-08 ENCOUNTER — OFFICE VISIT (OUTPATIENT)
Dept: MATERNAL FETAL MEDICINE | Facility: CLINIC | Age: 32
End: 2017-11-08
Attending: ADVANCED PRACTICE MIDWIFE
Payer: COMMERCIAL

## 2017-11-08 ENCOUNTER — HOSPITAL ENCOUNTER (OUTPATIENT)
Dept: ULTRASOUND IMAGING | Facility: CLINIC | Age: 32
Discharge: HOME | End: 2017-11-08
Attending: ADVANCED PRACTICE MIDWIFE | Admitting: OBSTETRICS & GYNECOLOGY
Payer: COMMERCIAL

## 2017-11-08 DIAGNOSIS — O26.90 PREGNANCY RELATED CONDITION, ANTEPARTUM: ICD-10-CM

## 2017-11-08 DIAGNOSIS — O35.03X0 CHOROID PLEXUS CYST OF FETUS AFFECTING CARE OF MOTHER, ANTEPARTUM, SINGLE OR UNSPECIFIED FETUS: Primary | ICD-10-CM

## 2017-11-08 PROCEDURE — 76811 OB US DETAILED SNGL FETUS: CPT

## 2017-11-08 PROCEDURE — 40000791 ZZHCL STATISTIC VERIFI PRENATAL TRISOMY 21,18,13: Performed by: OBSTETRICS & GYNECOLOGY

## 2017-11-08 PROCEDURE — 96040 ZZH GENETIC COUNSELING, EACH 30 MINUTES: CPT | Performed by: GENETIC COUNSELOR, MS

## 2017-11-08 PROCEDURE — 40000072 ZZH STATISTIC GENETIC COUNSELING, < 16 MIN: Mod: ZF | Performed by: GENETIC COUNSELOR, MS

## 2017-11-08 PROCEDURE — 36415 COLL VENOUS BLD VENIPUNCTURE: CPT | Performed by: OBSTETRICS & GYNECOLOGY

## 2017-11-08 NOTE — PROGRESS NOTES
[facility]  Genetic Counseling Consult    Patient: Yris Johnson YOB: 1985       Yris Johnson was seen at the [facility] for genetic consultation as part of her appointment for comprehensive ultrasound.  The indication for genetic counseling is outside ultrasound finding of choroid plexus cyst.       Impression/Plan:   1. Yris had an 18 week ultrasound which note a single right sided choroid plexus cyst and was referred to [facility] for a follow up ultrasound and genetic counseling appointment to discuss testing options.    2. Yris had a comprehensive (level II) ultrasound today, which confirmed the finding of CPC and was otherwise normal.  Please see the ultrasound report for further details.    3. The patient had a blood draw for NIPT (Innatal test through [email or web address]).  Results are expected within 7-10 days, and will be available in [employer].  We will contact her at the number she provided [phone] to discuss the results, and detailed results will be left in her voicemail if she cannot be reached.  A copy will be forwarded to the office of the referring OB provider.    Pregnancy History:   /Parity:    Age at Delivery: 32 year old  CEM: 2018, by Last Menstrual Period  Gestational Age: 18w2d    No significant complications or exposures were reported in the current pregnancy.    Medical History:   Yris marie reported medical history is not expected to impact pregnancy management or risks to fetal development.       Family History:   A three-generation pedigree was obtained, and is scanned under the  Media  tab.   The following significant findings were reported by Yris:    Paternal uncle  in infancy from reported birth injury/cerebral palsy.  There are many things that can result in a infant dying, including complications from delivery.  Cerebral palsy resulting from a birth complication in a family  member is not expected to alter risks for Yris's pregnancy.     There are no other reported significant health findings in either Yris or her 's family history.      Otherwise, the reported family history is negative for multiple miscarriages, stillbirths, birth defects, cognitive impairment, known genetic conditions, and consanguinity.       Carrier Screening:   The patient reports that she is of  ancestry:      Cystic fibrosis is an autosomal recessive genetic condition that occurs with increased frequency in individuals of  ancestry and carrier screening for this condition is available.  In addition,  screening in the [facility] includes cystic fibrosis.      Expanded carrier screening for mutations in a large panel of genes associated with autosomal recessive conditions including cystic fibrosis, spinal muscular atrophy, and others, is now available.      Carrier screening was not discussed today given the circumstances but is an option that is available to all individuals who are pregnant or considering pregnancy.            Discussion:     The majority of today's appointment was spent discussion choroid plexus cysts and risks associated with them.  CPCs are small fluid filled cysts in the part of the brain that is responsible for making cerebrospinal fluid.  CPCs do not cause problems, and typically disappear as pregnancy progresses.  CPCs are seen in approximately 1% of pregnancies.  Rarely, CPCs are seen in pregnancies that have a genetic problem called a chromosome abnormality, with Trisomy 18 being the most commonly associated condition.  When CPCs are seen with other markers, or indicators, for Trisomy 18, they increase the likelihood that a pregnancy might be affected with that condition.  When CPCs are seen as an isolated finding, meaning there are no other markers for Trisomy 18, the change in risk for trisomy 18 is less clear, with evidence suggesting that  isolated CPC does not impact risk for trisomy 18 at all.  We would not consider Yris as high risk for her pregnancy to be affected with Trisomy 18 based only on the finding of a CPC.  However, chromosome abnormalities can occur in any pregnancy, and we discussed that the major risk factor for these conditions is maternal age.  Based on maternal age, we discussed the following risk numbers:     - At age 32 at midtrimester, the risk to have a baby with Down syndrome is 1 in 597.  - At age 32 at midtrimester, the risk to have a baby with any chromosome abnormality is 1 in 299.   - At age 32 at delivery, the risk to have a baby with Down syndrome is 1 in 909.   - At age 32 at delivery, the risk to have a baby with any chromosome abnormality is 1 in 355.     Yris's ultrasound today noted no other markers or abnormalities associated with chromosome abnormalities.  Yris has not had any serum screening for aneuploidy to this point in pregnancy, and we discussed that additional testing is a way to get more information for individuals who are concerned about these conditions.     We discussed the following options:   Non-invasive Prenatal Testing (NIPT)    Maternal plasma cell-free DNA testing; first trimester ultrasound with nuchal translucency and nasal bone assessment is recommended, when appropriate    Screens for fetal trisomy 21, trisomy 13, trisomy 18, and sex chromosome aneuploidy    Cannot screen for open neural tube defects; maternal serum AFP after 15 weeks is recommended     Genetic Amniocentesis    Invasive procedure typically performed in the second trimester by which amniotic fluid is obtained for the purpose of chromosome analysis and/or other prenatal genetic analysis    Diagnostic results; >99% sensitivity for fetal chromosome abnormalities    AFAFP measurement tests for open neural tube defects     Comprehensive (Level II) ultrasound:     Detailed ultrasound performed between 18-22 weeks  gestation.    Screen for major birth defects and markers for aneuploidy.    We reviewed the benefits and limitations of this testing.  Screening tests provide a risk assessment specific to the pregnancy for certain fetal chromosome abnormalities, but cannot definitively diagnose or exclude a fetal chromosome abnormality.  Follow-up genetic counseling and consideration of diagnostic testing is recommended with any abnormal screening result.     Diagnostic tests carry inherent risks- including risk of miscarriage- that require careful consideration.  These tests can detect fetal chromosome abnormalities with greater than 99% certainty.  Results can be compromised by maternal cell contamination or mosaicism, and are limited by the resolution of cytogenetic G-banding technology.  There is no screening nor diagnostic test that can detect all forms of birth defects or mental disability.    At the conclusion of our discussion today, Yris felt that screening for chromosome abnormalities via NIPT would be beneficial for her, as she desires more information regarding risks to her ongoing pregnancy.  Blood as drawn for NIPT at the conclusion of Yris's appointments today.  Please see impression/plan section for results disclosure plan.       It was a pleasure to be involved with Yris s care. Face-to-face time of the meeting was 35 minutes.      Tera Domínguez MS, [facility]  Certified Genetic Counselor  Phone: [phone]  Pager: [phone]

## 2017-11-08 NOTE — MR AVS SNAPSHOT
After Visit Summary   11/8/2017    Yris Johnson    MRN: [id number]           Patient Information     Date Of Birth          1985        Visit Information        Provider Department      11/8/2017 10:45 AM Ruddy Chavez MD [facility] Maternal Fetal Medicine [facility]        Today's Diagnoses     Choroid plexus cyst of fetus affecting care of mother, antepartum, single or unspecified fetus    -  1       Follow-ups after your visit        Who to contact     If you have questions or need follow up information about today's clinic visit or your schedule please contact [facility] MATERNAL FETAL MEDICINE [facility] directly at [phone].  Normal or non-critical lab and imaging results will be communicated to you by [employer], letter or phone within 4 business days after the clinic has received the results. If you do not hear from us within 7 days, please contact the clinic through [employer] or phone. If you have a critical or abnormal lab result, we will notify you by phone as soon as possible.  Submit refill requests through [email or web address] or call your pharmacy and they will forward the refill request to us. Please allow 3 business days for your refill to be completed.          Additional Information About Your Visit        MyChart Information     [email or web address] gives you secure access to your electronic health record. If you see a primary care provider, you can also send messages to your care team and make appointments. If you have questions, please call your primary care clinic.  If you do not have a primary care provider, please call [phone] and they will assist you.        Care EveryWhere ID     This is your Care EveryWhere ID. This could be used by other organizations to access your [place] medical records  [id number]        Your Vitals Were     Last Period                   07/03/2017            Blood Pressure from Last 3 Encounters:   11/06/17 120/82   10/18/17 110/84   09/19/17 130/86    Weight  from Last 3 Encounters:   11/06/17 79.4 kg (175 lb)   10/18/17 79.4 kg (175 lb)   09/19/17 78.5 kg (173 lb)              Today, you had the following     No orders found for display       Primary Care Provider Office Phone # Fax #    Kenia Witt [phone]       [address] [address]        Equal Access to Services     JUAN JOSE MARQUEZ : Hadii aad ku hadasho Soomaali, waaxda luqadaha, qaybta kaalmada adeegyada, waxay idiin hayaan adeeg navaaradenny lakelley . So [facility] [phone].    ATENCIÓN: Si habla español, tiene a schulz disposición servicios gratuitos de asistencia lingüística. [facility] [phone].    We comply with applicable federal civil rights laws and [place] laws. We do not discriminate on the basis of race, color, national origin, age, disability, sex, sexual orientation, or gender identity.            Thank you!     Thank you for choosing [facility]  for your care. Our goal is always to provide you with excellent care. Hearing back from our patients is one way we can continue to improve our services. Please take a few minutes to complete the written survey that you may receive in the mail after your visit with us. Thank you!             Your Updated Medication List - Protect others around you: Learn how to safely use, store and throw away your medicines at www.disposemymeds.org.          This list is accurate as of: 11/8/17  1:57 PM.  Always use your most recent med list.                   Brand Name Dispense Instructions for use Diagnosis    MAGNESIUM OXIDE PO           PRENATAL 1 PO

## 2017-11-08 NOTE — MR AVS SNAPSHOT
After Visit Summary   11/8/2017    Yris Johnson    MRN: [id number]           Patient Information     Date Of Birth          1985        Visit Information        Provider Department      11/8/2017 9:30 AM Tera Domínguez GC [facility] Maternal Fetal Medicine [facility]        Today's Diagnoses     Choroid plexus cyst of fetus, single or unspecified fetus    -  1    Pregnancy related condition, antepartum           Follow-ups after your visit        Who to contact     If you have questions or need follow up information about today's clinic visit or your schedule please contact [facility] MATERNAL FETAL MEDICINE [facility] directly at [phone].  Normal or non-critical lab and imaging results will be communicated to you by [employer], letter or phone within 4 business days after the clinic has received the results. If you do not hear from us within 7 days, please contact the clinic through [employer] or phone. If you have a critical or abnormal lab result, we will notify you by phone as soon as possible.  Submit refill requests through [employer] or call your pharmacy and they will forward the refill request to us. Please allow 3 business days for your refill to be completed.          Additional Information About Your Visit        MyChart Information     [employer] gives you secure access to your electronic health record. If you see a primary care provider, you can also send messages to your care team and make appointments. If you have questions, please call your primary care clinic.  If you do not have a primary care provider, please call [phone] and they will assist you.        Care EveryWhere ID     This is your Care EveryWhere ID. This could be used by other organizations to access your [place] medical records  [id number]        Your Vitals Were     Last Period                   07/03/2017            Blood Pressure from Last 3 Encounters:   11/06/17 120/82   10/18/17 110/84   09/19/17 130/86     Weight from Last 3 Encounters:   11/06/17 79.4 kg (175 lb)   10/18/17 79.4 kg (175 lb)   09/19/17 78.5 kg (173 lb)              We Performed the Following     [facility] Genetic Counseling        Primary Care Provider Office Phone # Fax #    Kenia Witt [phone]       [address] [address]        Equal Access to Services     JUAN JOSE MARQUEZ : Hadii aad ku hadasho Soomaali, waaxda luqadaha, qaybta kaalmada adeegyada, waxay idiin hayaan adeeg gary lakelley . So [facility] [phone].    ATENCIÓN: Si habla español, tiene a schulz disposición servicios gratuitos de asistencia lingüística. Llame al [phone].    We comply with applicable federal civil rights laws and [place] laws. We do not discriminate on the basis of race, color, national origin, age, disability, sex, sexual orientation, or gender identity.            Thank you!     Thank you for choosing [facility]  for your care. Our goal is always to provide you with excellent care. Hearing back from our patients is one way we can continue to improve our services. Please take a few minutes to complete the written survey that you may receive in the mail after your visit with us. Thank you!             Your Updated Medication List - Protect others around you: Learn how to safely use, store and throw away your medicines at www.disposemymeds.org.          This list is accurate as of: 11/8/17 12:08 PM.  Always use your most recent med list.                   Brand Name Dispense Instructions for use Diagnosis    MAGNESIUM OXIDE PO           PRENATAL 1 PO

## 2017-11-08 NOTE — PROGRESS NOTES
Please see full imaging report from ViewPoint program under imaging tab.    Isolated CP cyst. Opts for NIPT screening.     Ruddy Chavez MD  Maternal Fetal Medicine

## 2017-11-14 ENCOUNTER — TELEPHONE (OUTPATIENT)
Dept: MATERNAL FETAL MEDICINE | Facility: CLINIC | Age: 32
End: 2017-11-14

## 2017-11-14 NOTE — TELEPHONE ENCOUNTER
11/14/2017       Called Yris to discuss NIPT results.  Results came back negative for chromosome abnormalities in chromosomes 21, 18, & 13, as well as the sex chromosomes.  The test identified sex chromosomes consistent with female sex (XX) which is consistent with ultrasound.  These test results do not definitively rule out the possibility of one of these conditions, but they do greatly reduce the likelihood. Yris had no questions at this time and was encouraged to reach out if she has any questions or concerns in the future.       Tera Domínguez MS, [facility]  Certified Genetic Counselor  Phone: [phone]  Pager: [phone]

## 2017-11-16 LAB — LAB SCANNED RESULT: NORMAL

## 2017-12-04 ENCOUNTER — TELEPHONE (OUTPATIENT)
Dept: MATERNAL FETAL MEDICINE | Facility: CLINIC | Age: 32
End: 2017-12-04

## 2017-12-04 NOTE — TELEPHONE ENCOUNTER
12/4/2017    Yris called with a question regarding a recent explanation of benefits for her NIPT testing and left a voicemail asking me to call her back.  I returned her call and we discussed that EoB's are not bills, and she may not end up being billed for the entire $7,500 described on that document.  Yris reports that she had gotten in touch with [facility] prior to me calling her back, and that they explained the same thing to her, and that likely her insurance would cover her testing, but there are several rounds of paperwork that have to go back and forth between her insurance provider and [facility], and that this process can take several weeks from when she first receives the explanation of benefits.  I reassured Yris that the testing was ordered because of an ultrasound finding, and that that was documented in her medical record from her appointment with me.  I provided Yris with the direct contact information for a [facility] representative, Javier Orozco so that Yris has a specific person she can contact with questions or concerns during the billing process, as I am not directly involved.  Yris plans to wait a week or so before contacting [facility] to see if things have changed regarding the status of her billing.  Yris felt comfortable with this plan and will reach out to me in the future if there are any concerns in the future.      Tera Domínguez MS, [facility]  Certified Genetic Counselor  Phone: [phone]  Pager: [phone]

## 2017-12-18 ENCOUNTER — PRENATAL OFFICE VISIT (OUTPATIENT)
Dept: MIDWIFE SERVICES | Facility: CLINIC | Age: 32
End: 2017-12-18
Payer: COMMERCIAL

## 2017-12-18 VITALS
BODY MASS INDEX: 32.65 KG/M2 | SYSTOLIC BLOOD PRESSURE: 128 MMHG | DIASTOLIC BLOOD PRESSURE: 80 MMHG | HEART RATE: 106 BPM | WEIGHT: 190.2 LBS | TEMPERATURE: 96.4 F

## 2017-12-18 DIAGNOSIS — R73.09 ABNORMAL GLUCOSE TOLERANCE TEST: ICD-10-CM

## 2017-12-18 DIAGNOSIS — Z34.02 ENCOUNTER FOR SUPERVISION OF NORMAL FIRST PREGNANCY IN SECOND TRIMESTER: Primary | ICD-10-CM

## 2017-12-18 LAB
GLUCOSE 1H P 50 G GLC PO SERPL-MCNC: 151 MG/DL (ref 60–129)
HGB BLD-MCNC: 11.8 G/DL (ref 11.7–15.7)

## 2017-12-18 PROCEDURE — 82950 GLUCOSE TEST: CPT | Performed by: ADVANCED PRACTICE MIDWIFE

## 2017-12-18 PROCEDURE — 99207 ZZC PRENATAL VISIT: CPT | Performed by: ADVANCED PRACTICE MIDWIFE

## 2017-12-18 PROCEDURE — 36415 COLL VENOUS BLD VENIPUNCTURE: CPT | Performed by: ADVANCED PRACTICE MIDWIFE

## 2017-12-18 PROCEDURE — 00000218 ZZHCL STATISTIC OBHBG - HEMOGLOBIN: Performed by: ADVANCED PRACTICE MIDWIFE

## 2017-12-18 NOTE — NURSING NOTE
Prenatal Breastfeeding Education Toolkit provided for patient to review, helping her to make an informed decision on a feeding choice for her baby. Content of toolkit  Includes: benefits of breastfeeding, importance of skin to skin and rooming in.  Questions directed to her provider.

## 2017-12-18 NOTE — PROGRESS NOTES
12/18/2017   Feeling well. No concerns. GCT and hgb today. Making a birthplan with her . Planning a hospital tour soon. Reports good fetal movement. Denies leaking of fluid, vaginal bleeding, regular uterine contractions, headache or other concerns.  RTC in 4 wks SHERI

## 2017-12-18 NOTE — MR AVS SNAPSHOT
After Visit Summary   12/18/2017    Yris Johnson    MRN: [id number]           Patient Information     Date Of Birth          1985        Visit Information        Provider Department      12/18/2017 4:00 PM Ruthie Acosta APRN CNM [facility] [place] [place]        Today's Diagnoses     Encounter for supervision of normal first pregnancy in second trimester    -  1       Follow-ups after your visit        Who to contact     If you have questions or need follow up information about today's clinic visit or your schedule please contact [facility] [place] [facility] [place] directly at [phone].  Normal or non-critical lab and imaging results will be communicated to you by Arbsourcehart, letter or phone within 4 business days after the clinic has received the results. If you do not hear from us within 7 days, please contact the clinic through CAL Cargo Airlinest or phone. If you have a critical or abnormal lab result, we will notify you by phone as soon as possible.  Submit refill requests through Seismic Software or call your pharmacy and they will forward the refill request to us. Please allow 3 business days for your refill to be completed.          Additional Information About Your Visit        MyChart Information     Seismic Software gives you secure access to your electronic health record. If you see a primary care provider, you can also send messages to your care team and make appointments. If you have questions, please call your primary care clinic.  If you do not have a primary care provider, please call [phone] and they will assist you.        Care EveryWhere ID     This is your Care EveryWhere ID. This could be used by other organizations to access your [place] medical records  [id number]        Your Vitals Were     Pulse Temperature Last Period BMI (Body Mass Index)          106 96.4  F (35.8  C) (Oral) 07/03/2017 32.65 kg/m2         Blood Pressure from Last 3 Encounters:   12/18/17 128/80   11/06/17 120/82    10/18/17 110/84    Weight from Last 3 Encounters:   12/18/17 190 lb 3.2 oz (86.3 kg)   11/06/17 175 lb (79.4 kg)   10/18/17 175 lb (79.4 kg)              We Performed the Following     Glucose tolerance gest screen 1 hour     OB hemoglobin        Primary Care Provider Office Phone # Fax #    PremiumBrooklynn Witt [phone]       [address] [address]        Equal Access to Services     JUAN JOSE MARQUEZ : Hadii aad ku hadasho Soomaali, waaxda luqadaha, qaybta kaalmada adeegyada, waxjax wright haysohailn thomas starr . So [facility] [phone].    ATENCIÓN: Si habla español, tiene a schulz disposición servicios gratuitos de asistencia lingüística. Deanna al [phone].    We comply with applicable federal civil rights laws and [place] laws. We do not discriminate on the basis of race, color, national origin, age, disability, sex, sexual orientation, or gender identity.            Thank you!     Thank you for [place] [facility] [place] [facility] [place] your [place]. Our goal is always to provide you with excellent care. Hearing back from our patients is one way we can continue to improve our services. Please take a few minutes to complete the written survey that you may receive in the mail after your visit with us. Thank you!             Your Updated Medication List - Protect others around you: Learn how to safely use, store and throw away your medicines at www.disposemymeds.org.          This list is accurate as of: 12/18/17  4:44 PM.  Always use your most recent med list.                   Brand Name Dispense Instructions for use Diagnosis    MAGNESIUM OXIDE PO           PRENATAL 1 PO

## 2017-12-19 NOTE — PROGRESS NOTES
Can you call patient with results and order three hour GTT? I appreciate it.   Thank You,   Ruthie Acosta CNM CNM

## 2017-12-22 DIAGNOSIS — Z34.02 ENCOUNTER FOR SUPERVISION OF NORMAL FIRST PREGNANCY IN SECOND TRIMESTER: ICD-10-CM

## 2017-12-22 DIAGNOSIS — R73.09 ABNORMAL GLUCOSE TOLERANCE TEST: ICD-10-CM

## 2017-12-22 LAB
GLUCOSE 1H P 100 G GLC PO SERPL-MCNC: 189 MG/DL (ref 60–179)
GLUCOSE 2H P 100 G GLC PO SERPL-MCNC: 146 MG/DL (ref 60–154)
GLUCOSE 3H P 100 G GLC PO SERPL-MCNC: 99 MG/DL (ref 60–139)
GLUCOSE P FAST SERPL-MCNC: 74 MG/DL (ref 60–94)

## 2017-12-22 PROCEDURE — 36415 COLL VENOUS BLD VENIPUNCTURE: CPT | Performed by: ADVANCED PRACTICE MIDWIFE

## 2017-12-22 PROCEDURE — 82952 GTT-ADDED SAMPLES: CPT | Performed by: ADVANCED PRACTICE MIDWIFE

## 2017-12-22 PROCEDURE — 82951 GLUCOSE TOLERANCE TEST (GTT): CPT | Performed by: ADVANCED PRACTICE MIDWIFE

## 2017-12-23 NOTE — PROGRESS NOTES
Dear Yris,    You passed your glucose test and do not have gestational diabetes. Even though your blood sugar was a little elevated at 1 hour the rest of your results were normal. You would have needed to fail two of the values in order to have diabetes. So it is good news.  If you have any questions, please contact me via Blue Securityt or you can call our office at [phone].    Ruthie Aponte CNM, [place]

## 2018-01-16 ENCOUNTER — PRENATAL OFFICE VISIT (OUTPATIENT)
Dept: MIDWIFE SERVICES | Facility: CLINIC | Age: 33
End: 2018-01-16
Payer: COMMERCIAL

## 2018-01-16 VITALS — SYSTOLIC BLOOD PRESSURE: 122 MMHG | DIASTOLIC BLOOD PRESSURE: 84 MMHG | WEIGHT: 197.6 LBS | BODY MASS INDEX: 33.92 KG/M2

## 2018-01-16 DIAGNOSIS — Z23 NEED FOR TDAP VACCINATION: ICD-10-CM

## 2018-01-16 DIAGNOSIS — Z34.02 ENCOUNTER FOR SUPERVISION OF NORMAL FIRST PREGNANCY IN SECOND TRIMESTER: Primary | ICD-10-CM

## 2018-01-16 DIAGNOSIS — Z86.59 HISTORY OF EATING DISORDER: ICD-10-CM

## 2018-01-16 PROCEDURE — 99207 ZZC PRENATAL VISIT: CPT | Performed by: ADVANCED PRACTICE MIDWIFE

## 2018-01-16 NOTE — Clinical Note
Clementine,  You are seeing her in 2 wks.  Wt gain has been elevated last 2 visits with passed 3 hr.  She has had treatment for eating disorder so this is sensitive but wonder if she would do dietary counseling either with her past program or at ?             Ludin

## 2018-01-16 NOTE — PROGRESS NOTES
Took BP after sitting for 5 minutes.  Passed her 3 hr with one elevated level at 1 hr of 189 with fasting at 74.  Has had large interval wt gains of 15 and 7#.  TWG of 27#.  Hx of eating disorder treatment program.  Questions on tour of BP and is doing CBE with her  and breast feeding class at [place].  Denies ctx and reviewed S/S of PTL and fetal movement counts and when to do them.  Reviewed US and while FRC US had placenta at 1.5 cm from os MFM US was noted a placenta posterior with no previa.  Reviewed this with pt and no indication for repeat US. Is currently getting over a cold symptoms that has been ongoing for 3 wks.  No flu like symptoms -URI.  Declines Tdap today due to cold.   ASSESSMENT: 28w1d    PLAN: RTC in 2 wks.  PATRICIA

## 2018-01-16 NOTE — MR AVS SNAPSHOT
After Visit Summary   1/16/2018    Yris Johnson    MRN: [id number]           Patient Information     Date Of Birth          1985        Visit Information        Provider Department      1/16/2018 8:00 AM Ludin Francois APRN CNM [facility]        Today's Diagnoses     Encounter for supervision of normal first pregnancy in second trimester    -  1    Need for Tdap vaccination        History of eating disorder           Follow-ups after your visit        Your next 10 appointments already scheduled     Feb 01, 2018 10:00 AM CST   ESTABLISHED PRENATAL with Clementine Knapp CNM   [facility] [place]    [address] [place] [address] [phone]              Who to contact     If you have questions or need follow up information about today's clinic visit or your schedule please contact [facility] directly at [phone].  Normal or non-critical lab and imaging results will be communicated to you by MyChart, letter or phone within 4 business days after the clinic has received the results. If you do not hear from us within 7 days, please contact the clinic through ZoomCar Indiahart or phone. If you have a critical or abnormal lab result, we will notify you by phone as soon as possible.  Submit refill requests through PhysioSonics or call your pharmacy and they will forward the refill request to us. Please allow 3 business days for your refill to be completed.          Additional Information About Your Visit        MyChart Information     PhysioSonics gives you secure access to your electronic health record. If you see a primary care provider, you can also send messages to your care team and make appointments. If you have questions, please call your primary care clinic.  If you do not have a primary care provider, please call [phone] and they will assist you.        Care EveryWhere ID     This is your Care EveryWhere  ID. This could be used by other organizations to access your [place] medical records  [id number]        Your Vitals Were     Last Period BMI (Body Mass Index)                07/03/2017 33.92 kg/m2           Blood Pressure from Last 3 Encounters:   01/16/18 122/84   12/18/17 128/80   11/06/17 120/82    Weight from Last 3 Encounters:   01/16/18 197 lb 9.6 oz (89.6 kg)   12/18/17 190 lb 3.2 oz (86.3 kg)   11/06/17 175 lb (79.4 kg)              Today, you had the following     No orders found for display       Primary Care Provider Office Phone # Fax #    Kenia Witt [phone] [address] [address]        Equal Access to Services     DEON MARQUEZ : Hadii aad ku hadasho Sojaqueline, waaxda luqadaha, qaybta kaalmada andriy, phani starr . So [facility] [phone].    ATENCIÓN: Si habla español, tiene a schulz disposición servicios gratuitos de asistencia lingüística. Deanna al [phone].    We comply with applicable federal civil rights laws and [place] laws. We do not discriminate on the basis of race, color, national origin, age, disability, sex, sexual orientation, or gender identity.            Thank you!     Thank you for choosing [facility]  for your care. Our goal is always to provide you with excellent care. Hearing back from our patients is one way we can continue to improve our services. Please take a few minutes to complete the written survey that you may receive in the mail after your visit with us. Thank you!             Your Updated Medication List - Protect others around you: Learn how to safely use, store and throw away your medicines at www.disposemymeds.org.          This list is accurate as of: 1/16/18 11:17 AM.  Always use your most recent med list.                   Brand Name Dispense Instructions for use Diagnosis    MAGNESIUM OXIDE PO           PRENATAL 1 PO

## 2018-02-01 ENCOUNTER — PRENATAL OFFICE VISIT (OUTPATIENT)
Dept: MIDWIFE SERVICES | Facility: CLINIC | Age: 33
End: 2018-02-01
Payer: COMMERCIAL

## 2018-02-01 VITALS
BODY MASS INDEX: 34.67 KG/M2 | DIASTOLIC BLOOD PRESSURE: 70 MMHG | OXYGEN SATURATION: 96 % | WEIGHT: 202 LBS | HEART RATE: 131 BPM | SYSTOLIC BLOOD PRESSURE: 110 MMHG

## 2018-02-01 DIAGNOSIS — Z34.02 ENCOUNTER FOR SUPERVISION OF NORMAL FIRST PREGNANCY IN SECOND TRIMESTER: Primary | ICD-10-CM

## 2018-02-01 PROCEDURE — 99207 ZZC PRENATAL VISIT: CPT | Performed by: ADVANCED PRACTICE MIDWIFE

## 2018-02-01 NOTE — MR AVS SNAPSHOT
After Visit Summary   2/1/2018    Yris Johnson    MRN: [id number]           Patient Information     Date Of Birth          1985        Visit Information        Provider Department      2/1/2018 10:00 AM Clementine Knapp CNM [facility]        Today's Diagnoses     Encounter for supervision of normal first pregnancy in third trimester    -  1       Follow-ups after your visit        Follow-up notes from your care team     Return in about 2 weeks (around 2/15/2018) for Prenatal care with URSULA.      Your next 10 appointments already scheduled     Feb 13, 2018  4:00 PM CST   ESTABLISHED PR[place]AT[place] with MEME [facility]olasco [facility]   [facility]    [address] [place] [address] [phone]              Who to contact     If you have questions or need follow up information about today's clinic visit or your schedule please contact [facility] directly at [phone].  Normal or non-critical lab and imaging results will be communicated to you by CodeHShart, letter or phone within 4 business days after the clinic has received the results. If you do not hear from us within 7 days, please contact the clinic through GenerationOne or phone. If you have a critical or abnormal lab result, we will notify you by phone as soon as possible.  Submit refill requests through GenerationOne or call your pharmacy and they will forward the refill request to us. Please allow 3 business days for your refill to be completed.          Additional Information About Your Visit        CodeHShart Information     GenerationOne gives you secure access to your electronic health record. If you see a primary care provider, you can also send messages to your care team and make appointments. If you have questions, please call your primary care clinic.  If you do not have a primary care provider, please call [phone] and they will assist you.         Care EveryWhere ID     This is your Care EveryWhere ID. This could be used by other organizations to access your [place] medical records  [id number]        Your Vitals Were     Pulse Last Period Pulse Oximetry Breastfeeding? BMI (Body Mass Index)       131 07/03/2017 96% No 34.67 kg/m2        Blood Pressure from Last 3 Encounters:   02/01/18 110/70   01/16/18 122/84   12/18/17 128/80    Weight from Last 3 Encounters:   02/01/18 202 lb (91.6 kg)   01/16/18 197 lb 9.6 oz (89.6 kg)   12/18/17 190 lb 3.2 oz (86.3 kg)              Today, you had the following     No orders found for display       Primary Care Provider Office Phone # Fax #    Kenia Witt [phone] [address] [address]        Equal Access to Services     [facility]: Hadii aad ku hadasho Soomaali, waaxda luqadaha, qaybta kaalmada adeegyada, waxay freddiein haysohailn thomas starr . So [facility] [phone].    ATENCIÓN: Si habla español, tiene a schulz disposición servicios gratuitos de asistencia lingüística. Deanna al [phone].    We comply with applicable federal civil rights laws and [place] laws. We do not discriminate on the basis of race, color, national origin, age, disability, sex, sexual orientation, or gender identity.            Thank you!     Thank you for choosing [facility]  for your care. Our goal is always to provide you with excellent care. Hearing back from our patients is one way we can continue to improve our services. Please take a few minutes to complete the written survey that you may receive in the mail after your visit with us. Thank you!             Your Updated Medication List - Protect others around you: Learn how to safely use, store and throw away your medicines at www.disposemymeds.org.          This list is accurate as of 2/1/18 10:47 AM.  Always use your most recent med list.                   Brand Name Dispense Instructions for use  Diagnosis    MAGNESIUM OXIDE PO           PRENATAL 1 PO

## 2018-02-01 NOTE — PROGRESS NOTES
Feeling well.  Baby is active. Denies any leaking of fluid, vaginal bleeding, regular uterine contractions, or headaches or other concerns.  Reviewed to call [phone] for contractions, loss of fluid, vaginal bleeding, decreased fetal movement or any other questions or concerns.    RTC in 2 weeks.  Clementine Knapp, BETHANY, APRN, CNM

## 2018-02-13 ENCOUNTER — PRENATAL OFFICE VISIT (OUTPATIENT)
Dept: MIDWIFE SERVICES | Facility: CLINIC | Age: 33
End: 2018-02-13
Payer: COMMERCIAL

## 2018-02-13 VITALS
OXYGEN SATURATION: 96 % | WEIGHT: 209.8 LBS | BODY MASS INDEX: 36.01 KG/M2 | SYSTOLIC BLOOD PRESSURE: 110 MMHG | DIASTOLIC BLOOD PRESSURE: 84 MMHG | TEMPERATURE: 98.2 F | HEART RATE: 102 BPM

## 2018-02-13 DIAGNOSIS — Z34.03 ENCOUNTER FOR SUPERVISION OF NORMAL FIRST PREGNANCY IN THIRD TRIMESTER: ICD-10-CM

## 2018-02-13 DIAGNOSIS — Z23 NEED FOR TDAP VACCINATION: ICD-10-CM

## 2018-02-13 PROCEDURE — 90471 IMMUNIZATION ADMIN: CPT | Performed by: ADVANCED PRACTICE MIDWIFE

## 2018-02-13 PROCEDURE — 90715 TDAP VACCINE 7 YRS/> IM: CPT | Performed by: ADVANCED PRACTICE MIDWIFE

## 2018-02-13 PROCEDURE — 99207 ZZC PRENATAL VISIT: CPT | Performed by: ADVANCED PRACTICE MIDWIFE

## 2018-02-13 NOTE — PROGRESS NOTES
TDAP Vaccine (Adacel)      Date Status Dose VIS Date Route Site  Lot# Given By Verified By     2/13/2018 Given 0.5 mL 02/24/2015, Given Today IM RD [employer] [id number] Regina Fay MA --         Exp. Date NDC # Product Time Location External Comment     11/1/2019 16497-371-53  --  --      Updated by: Regina Fay MA on 2/13/2018  4:29 PM

## 2018-02-13 NOTE — MR AVS SNAPSHOT
After Visit Summary   2/13/2018    Yris Johnson    MRN: [id number]           Patient Information     Date Of Birth          1985        Visit Information        Provider Department      2/13/2018 4:00 PM Jo Garrido APRN CNM [facility]        Today's Diagnoses     Lactation due to pregnancy    -  1    Encounter for supervision of normal first pregnancy in third trimester           Follow-ups after your visit        Who to contact     If you have questions or need follow up information about today's clinic visit or your schedule please contact [facility] [place] [facility] directly at [phone].  Normal or non-critical lab and imaging results will be communicated to you by Peak Rx #2hart, letter or phone within 4 business days after the clinic has received the results. If you do not hear from us within 7 days, please contact the clinic through Peak Rx #2hart or phone. If you have a critical or abnormal lab result, we will notify you by phone as soon as possible.  Submit refill requests through SeeChange Health or call your pharmacy and they will forward the refill request to us. Please allow 3 business days for your refill to be completed.          Additional Information About Your Visit        MyChart Information     See[employer] Health gives you secure access to your electronic health record. If you see a primary care provider, you can also send messages to your care team and make appointments. If you have questions, please call your primary care clinic.  If you do not have a primary care provider, please call [phone] and they will assist you.        Care EveryWhere ID     This is your Care EveryWhere ID. This could be used by other organizations to access your [place] medical records  [id number]        Your Vitals Were     Pulse Temperature Last Period Pulse Oximetry BMI (Body Mass Index)       102 98.2  F (36.8  C) (Oral) 07/03/2017 96% 36.01 kg/m2        Blood Pressure from Last 3 Encounters:    02/13/18 110/84   02/01/18 110/70   01/16/18 122/84    Weight from Last 3 Encounters:   02/13/18 209 lb 12.8 oz (95.2 kg)   02/01/18 202 lb (91.6 kg)   01/16/18 197 lb 9.6 oz (89.6 kg)              We Performed the Following     BREAST PUMP        Primary Care Provider Office Phone # Fax #    TylerBrooklynn Witt [phone]       [address]   [facility] [address]        Equal Access to Services     [facility]: Hadii aad ku hadasho Sojaqueline, waaxda luqadaha, qaybta kaalmaelana ashraf, phani starr . So [facility] [phone].    ATENCIÓN: Si habla español, tiene a schulz disposición servicios gratuitos de asistencia lingüística. [facility] [phone].    We comply with applicable federal civil rights laws and [place] laws. We do not discriminate on the basis of race, color, national origin, age, disability, sex, sexual orientation, or gender identity.            Thank you!     Thank you for choosing [facility]  for your care. Our goal is always to provide you with excellent care. Hearing back from our patients is one way we can continue to improve our services. Please take a few minutes to complete the written survey that you may receive in the mail after your visit with us. Thank you!             Your Updated Medication List - Protect others around you: Learn how to safely use, store and throw away your medicines at www.disposemymeds.org.          This list is accurate as of 2/13/18  4:24 PM.  Always use your most recent med list.                   Brand Name Dispense Instructions for use Diagnosis    MAGNESIUM OXIDE PO           PRENATAL 1 PO

## 2018-02-13 NOTE — PROGRESS NOTES
32w1d  Patient feeling well. Positive fetal movement. Denies water leaking, vaginal bleeding, decreased fetal movement, contraction pain, or headaches.   Doing well, no concerns today. 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 She has a tour of the birth place this weekend. She has classes for both infant 101 and breastfeeding set up.   tdap vaccination today.   Danger signs reviewed, pre-eclampsia signs and symptoms discussed.   Knows when to call triage and has phone numbers.   Follow up in 2 weeks.   Jo MAYBERRY

## 2018-02-14 ENCOUNTER — TELEPHONE (OUTPATIENT)
Dept: MIDWIFE SERVICES | Facility: CLINIC | Age: 33
End: 2018-02-14

## 2018-02-14 NOTE — TELEPHONE ENCOUNTER
TC to the pt to let her know that we faxed her [place] paperwork, as requested on the form. LMTC. Mckenna Cortez RN

## 2018-02-28 ENCOUNTER — PRENATAL OFFICE VISIT (OUTPATIENT)
Dept: MIDWIFE SERVICES | Facility: CLINIC | Age: 33
End: 2018-02-28
Payer: COMMERCIAL

## 2018-02-28 VITALS
OXYGEN SATURATION: 100 % | HEART RATE: 127 BPM | DIASTOLIC BLOOD PRESSURE: 70 MMHG | SYSTOLIC BLOOD PRESSURE: 110 MMHG | WEIGHT: 214 LBS | BODY MASS INDEX: 36.73 KG/M2

## 2018-02-28 DIAGNOSIS — Z34.03 ENCOUNTER FOR SUPERVISION OF NORMAL FIRST PREGNANCY IN THIRD TRIMESTER: Primary | ICD-10-CM

## 2018-02-28 PROCEDURE — 99207 ZZC PRENATAL VISIT: CPT | Performed by: ADVANCED PRACTICE MIDWIFE

## 2018-02-28 NOTE — PROGRESS NOTES
Feeling well, c/o some mild hand and foot swelling.  Has a , is using [facility] who often recommend us.  GBS and hgb next visit, given H/O about birth plans and nitrous.  RTC 2 wks JR

## 2018-02-28 NOTE — MR AVS SNAPSHOT
After Visit Summary   2/28/2018    Yris Johnson    MRN: [id number]           Patient Information     Date Of Birth          1985        Visit Information        Provider Department      2/28/2018 8:00 AM Alexsandra Patel APRN CNM [facility] [place]        Today's Diagnoses     Encounter for supervision of normal first pregnancy in third trimester    -  1       Follow-ups after your visit        Who to contact     If you have questions or need follow up information about today's clinic visit or your schedule please contact [facility] [place] at [phone].  Normal or non-critical lab and imaging results will be communicated to you by PinchPointhart, letter or phone within 4 business days after the clinic has received the results. If you do not hear from us within 7 days, please contact the clinic through PinchPointhart or phone. If you have a critical or abnormal lab result, we will notify you by phone as soon as possible.  Submit refill requests through EventVue or call your pharmacy and they will forward the refill request to us. Please allow 3 business days for your refill to be completed.          Additional Information About Your Visit        MyChart Information     EventVue gives you secure access to your electronic health record. If you see a primary care provider, you can also send messages to your care team and make appointments. If you have questions, please call your primary care clinic.  If you do not have a primary care provider, please call [phone] and they will assist you.        Care EveryWhere ID     This is your Care EveryWhere ID. This could be used by other organizations to access your [place] medical records  [id number]        Your Vitals Were     Pulse Last Period Pulse Oximetry Breastfeeding? BMI (Body Mass Index)       127 07/03/2017 100% No 36.73 kg/m2        Blood Pressure from Last 3 Encounters:   02/28/18 110/70   02/13/18 110/84   02/01/18 110/70     Weight from Last 3 Encounters:   02/28/18 214 lb (97.1 kg)   02/13/18 209 lb 12.8 oz (95.2 kg)   02/01/18 202 lb (91.6 kg)              Today, you had the following     No orders found for display       Primary Care Provider Office Phone # Fax #    RehobothBrooklynn Witt [phone]       [address] [address]        Equal Access to Services     JUAN JOSE MARQUEZ : Hadii aad ku hadasho Soomaali, waaxda luqadaha, qaybta kaalmada adeegyada, waxay idiin hayaan adeeg gary lakelley alicea. So [facility] [phone].    ATENCIÓN: Si habla español, tiene a schulz disposición servicios gratuitos de asistencia lingüística. [facility] [phone].    We comply with applicable federal civil rights laws and [place] laws. We do not discriminate on the basis of race, color, national origin, age, disability, sex, sexual orientation, or gender identity.            Thank you!     Thank you for choosing [facility]  for your care. Our goal is always to provide you with excellent care. Hearing back from our patients is one way we can continue to improve our services. Please take a few minutes to complete the written survey that you may receive in the mail after your visit with us. Thank you!             Your Updated Medication List - Protect others around you: Learn how to safely use, store and throw away your medicines at www.disposemymeds.org.          This list is accurate as of 2/28/18  8:21 AM.  Always use your most recent med list.                   Brand Name Dispense Instructions for use Diagnosis    MAGNESIUM OXIDE PO           PRENATAL 1 PO

## 2018-03-13 ENCOUNTER — PRENATAL OFFICE VISIT (OUTPATIENT)
Dept: MIDWIFE SERVICES | Facility: CLINIC | Age: 33
End: 2018-03-13
Payer: COMMERCIAL

## 2018-03-13 VITALS
TEMPERATURE: 97.2 F | DIASTOLIC BLOOD PRESSURE: 89 MMHG | WEIGHT: 221.2 LBS | BODY MASS INDEX: 37.97 KG/M2 | SYSTOLIC BLOOD PRESSURE: 124 MMHG

## 2018-03-13 DIAGNOSIS — Z34.02 ENCOUNTER FOR SUPERVISION OF NORMAL FIRST PREGNANCY IN SECOND TRIMESTER: Primary | ICD-10-CM

## 2018-03-13 LAB — HGB BLD-MCNC: 13.5 G/DL (ref 11.7–15.7)

## 2018-03-13 PROCEDURE — 36415 COLL VENOUS BLD VENIPUNCTURE: CPT | Performed by: ADVANCED PRACTICE MIDWIFE

## 2018-03-13 PROCEDURE — 87653 STREP B DNA AMP PROBE: CPT | Performed by: ADVANCED PRACTICE MIDWIFE

## 2018-03-13 PROCEDURE — 99207 ZZC PRENATAL VISIT: CPT | Performed by: ADVANCED PRACTICE MIDWIFE

## 2018-03-13 PROCEDURE — 00000218 ZZHCL STATISTIC OBHBG - HEMOGLOBIN: Performed by: ADVANCED PRACTICE MIDWIFE

## 2018-03-13 ASSESSMENT — PATIENT HEALTH QUESTIONNAIRE - PHQ9: 5. POOR APPETITE OR OVEREATING: NOT AT ALL

## 2018-03-13 ASSESSMENT — ANXIETY QUESTIONNAIRES
GAD7 TOTAL SCORE: 0
5. BEING SO RESTLESS THAT IT IS HARD TO SIT STILL: NOT AT ALL
6. BECOMING EASILY ANNOYED OR IRRITABLE: NOT AT ALL
7. FEELING AFRAID AS IF SOMETHING AWFUL MIGHT HAPPEN: NOT AT ALL
1. FEELING NERVOUS, ANXIOUS, OR ON EDGE: NOT AT ALL
2. NOT BEING ABLE TO STOP OR CONTROL WORRYING: NOT AT ALL
3. WORRYING TOO MUCH ABOUT DIFFERENT THINGS: NOT AT ALL

## 2018-03-13 NOTE — MR AVS SNAPSHOT
After Visit Summary   3/13/2018    Yris Johnson    MRN: [id number]           Patient Information     Date Of Birth          1985        Visit Information        Provider Department      3/13/2018 10:15 AM Agnes Zhang APRN CNM [facility]        Today's Diagnoses     Encounter for supervision of normal first pregnancy in second trimester    -  1       Follow-ups after your visit        Your next 10 appointments already scheduled     Mar 19, 2018  4:30 PM CDT   ESTABLISHED PRENATAL with MEME Bernal CNM   [facility] ([facility])    [address] [place] [address] [phone]            Mar 27, 2018  3:45 PM CDT   ESTABLISHED PRENATAL with Clementine Knapp CNM   [facility] ([facility])    [address] [place] [id number] [phone]            Apr 02, 2018  8:00 AM CDT   ESTABLISHED PRENATAL with MEME Reyes CNM   [facility] ([facility])    [address] [place] [id number] [phone]              Who to contact     If you have questions or need follow up information about today's clinic visit or your schedule please contact [facility] directly at [phone].  Normal or non-critical lab and imaging results will be communicated to you by MyChart, letter or phone within 4 business days after the clinic has received the results. If you do not hear from us within 7 days, please contact the clinic through MyChart or phone. If you have a critical or abnormal lab result, we will notify you by phone as soon as possible.  Submit refill requests through [employer] or call your pharmacy and they will forward the refill request to us. Please allow 3 business days for your refill to be completed.          Additional Information About Your Visit        MyChart  Information     [employer] gives you secure access to your electronic health record. If you see a primary care provider, you can also send messages to your care team and make appointments. If you have questions, please call your primary care clinic.  If you do not have a primary care provider, please call [phone] and they will assist you.        Care EveryWhere ID     This is your Care EveryWhere ID. This could be used by other organizations to access your [place] medical records  [id number]        Your Vitals Were     Temperature Last Period BMI (Body Mass Index)             97.2  F (36.2  C) (Oral) 07/03/2017 37.97 kg/m2          Blood Pressure from Last 3 Encounters:   03/13/18 124/89   02/28/18 110/70   02/13/18 110/84    Weight from Last 3 Encounters:   03/13/18 221 lb 3.2 oz (100.3 kg)   02/28/18 214 lb (97.1 kg)   02/13/18 209 lb 12.8 oz (95.2 kg)              We Performed the Following     Group B strep PCR     OB hemoglobin        Primary Care Provider Office Phone # Fax #    [place]nn Witt [phone] [address] [address]        Equal Access to Services     JUAN JOSE MARQUEZ : Hadii dana ku hadasho Socristinaali, waaxda luqadaha, qaybta kaalmada adeegyada, phani starr . So [facility] [phone].    ATENCIÓN: Si habla español, tiene a schulz disposición servicios gratuitos de asistencia lingüística. Llame al [phone].    We comply with applicable federal civil rights laws and [place] laws. We do not discriminate on the basis of race, color, national origin, age, disability, sex, sexual orientation, or gender identity.            Thank you!     Thank you for choosing [facility]  for your care. Our goal is always to provide you with excellent care. Hearing back from our patients is one way we can continue to improve our services. Please take a few minutes to complete the written survey that you may receive in the mail  after your visit with us. Thank you!             Your Updated Medication List - Protect others around you: Learn how to safely use, store and throw away your medicines at www.disposemymeds.org.          This list is accurate as of 3/13/18 11:08 AM.  Always use your most recent med list.                   Brand Name Dispense Instructions for use Diagnosis    MAGNESIUM OXIDE PO           PRENATAL 1 PO

## 2018-03-13 NOTE — PROGRESS NOTES
36w1d  Feeling more carpal tunnel, using wrist braces that help, more swelling this week. But worked more last week. Is planning working from home some in the next month. Reviewed rest/activity times.  GBS and hgb today. Please assess BP in room.  Pt will bring in Birth plan next week.  Wants to meet baby, feeling tired.  rtc in 1 week mrb

## 2018-03-14 LAB
GP B STREP DNA SPEC QL NAA+PROBE: NEGATIVE
SPECIMEN SOURCE: NORMAL

## 2018-03-14 ASSESSMENT — ANXIETY QUESTIONNAIRES: GAD7 TOTAL SCORE: 0

## 2018-03-14 ASSESSMENT — PATIENT HEALTH QUESTIONNAIRE - PHQ9: SUM OF ALL RESPONSES TO PHQ QUESTIONS 1-9: 0

## 2018-03-19 ENCOUNTER — PRENATAL OFFICE VISIT (OUTPATIENT)
Dept: MIDWIFE SERVICES | Facility: CLINIC | Age: 33
End: 2018-03-19
Payer: COMMERCIAL

## 2018-03-19 VITALS
WEIGHT: 223.6 LBS | OXYGEN SATURATION: 97 % | HEART RATE: 118 BPM | HEIGHT: 64 IN | BODY MASS INDEX: 38.17 KG/M2 | DIASTOLIC BLOOD PRESSURE: 92 MMHG | SYSTOLIC BLOOD PRESSURE: 132 MMHG

## 2018-03-19 DIAGNOSIS — Z34.03 ENCOUNTER FOR SUPERVISION OF NORMAL FIRST PREGNANCY IN THIRD TRIMESTER: ICD-10-CM

## 2018-03-19 DIAGNOSIS — F41.9 ANXIETY: ICD-10-CM

## 2018-03-19 DIAGNOSIS — I10 ESSENTIAL HYPERTENSION: Primary | ICD-10-CM

## 2018-03-19 PROBLEM — Z34.02 ENCOUNTER FOR SUPERVISION OF NORMAL FIRST PREGNANCY IN SECOND TRIMESTER: Status: RESOLVED | Noted: 2017-10-18 | Resolved: 2018-03-19

## 2018-03-19 PROBLEM — Z34.02 ENCOUNTER FOR SUPERVISION OF NORMAL FIRST PREGNANCY IN SECOND TRIMESTER: Status: ACTIVE | Noted: 2017-10-18

## 2018-03-19 LAB
ALT SERPL W P-5'-P-CCNC: 23 U/L (ref 0–50)
AST SERPL W P-5'-P-CCNC: 20 U/L (ref 0–45)
CREAT UR-MCNC: 11 MG/DL
ERYTHROCYTE [DISTWIDTH] IN BLOOD BY AUTOMATED COUNT: 13.5 % (ref 10–15)
HCT VFR BLD AUTO: 39.1 % (ref 35–47)
HGB BLD-MCNC: 13.1 G/DL (ref 11.7–15.7)
MCH RBC QN AUTO: 29 PG (ref 26.5–33)
MCHC RBC AUTO-ENTMCNC: 33.5 G/DL (ref 31.5–36.5)
MCV RBC AUTO: 87 FL (ref 78–100)
PLATELET # BLD AUTO: 242 10E9/L (ref 150–450)
PROT UR-MCNC: <0.05 G/L
PROT/CREAT 24H UR: NORMAL G/G CR (ref 0–0.2)
RBC # BLD AUTO: 4.52 10E12/L (ref 3.8–5.2)
WBC # BLD AUTO: 10.4 10E9/L (ref 4–11)

## 2018-03-19 PROCEDURE — 84156 ASSAY OF PROTEIN URINE: CPT | Performed by: ADVANCED PRACTICE MIDWIFE

## 2018-03-19 PROCEDURE — 85027 COMPLETE CBC AUTOMATED: CPT | Performed by: ADVANCED PRACTICE MIDWIFE

## 2018-03-19 PROCEDURE — 84450 TRANSFERASE (AST) (SGOT): CPT | Performed by: ADVANCED PRACTICE MIDWIFE

## 2018-03-19 PROCEDURE — 36415 COLL VENOUS BLD VENIPUNCTURE: CPT | Performed by: ADVANCED PRACTICE MIDWIFE

## 2018-03-19 PROCEDURE — 99207 ZZC PRENATAL VISIT: CPT | Performed by: ADVANCED PRACTICE MIDWIFE

## 2018-03-19 PROCEDURE — 84460 ALANINE AMINO (ALT) (SGPT): CPT | Performed by: ADVANCED PRACTICE MIDWIFE

## 2018-03-19 NOTE — MR AVS SNAPSHOT
After Visit Summary   3/19/2018    Yris Johnson    MRN: [id number]           Patient Information     Date Of Birth          1985        Visit Information        Provider Department      3/19/2018 4:30 PM Ludin Francois APRN CNM [facility]        Today's Diagnoses     Essential hypertension    -  1    Anxiety        Encounter for supervision of normal first pregnancy in third trimester           Follow-ups after your visit        Additional Services     MAT FETAL MED CTR REFERRAL-PREGNANCY       >> Patient may proceed with recommendations for further testing as directed by the Maternal Fetal Medicine Specialist >>    >> If requesting Fetal Echo: M will determine appropriate location for exam due to indication.    >> If requesting Lung Maturity Amnio:  If results indicate fetal lung maturity, induction or C/S is recommended within 36 hours.  Please schedule accordingly.     Dear Patient:   Please be aware that coverage of these services is subject to the terms and limitations of your health insurance plan.  Call member services at your health plan with any benefit or coverage questions.      Please bring the following to your appointment:    >>  Any x-rays, CTs or MRIs which have been performed.  Contact the facility where they were done to arrange for  prior to your scheduled appointment.  Any new CT, MRI or other procedures ordered by your specialist must be performed at a [facility] facility or coordinated by your clinic's referral office.  >>  List of current medications   >>  This referral request   >>  Any documents/labs given to you for this referral                  Your next 10 appointments already scheduled     Mar 22, 2018  1:00 PM CDT   ESTABLISHED PRENATAL with MEME Anderson CNM   [facility] ([facility]    [address] [place] [address] [phone]            Mar 27, 2018  3:45 PM CDT    ESTABLISHED PRENATAL with MEME Reyes CNM   [facility] ([facility])    [address] [place] [address] [place]  [address]  [facility] [address] [phone]            Apr 02, 2018  8:00 AM CDT   ESTABLISHED PRENATAL with MEME Reyes CNM   [facility] ([facility])    [address]  [address]  [facility] [address] [phone]            Apr 10, 2018  3:30 PM CDT   ESTABLISHED PRENATAL with MEME Corral CNM   [facility] ([facility])    [address] [place] [address] [place]  [address]  [facility] [id number] [phone]              Who to contact     If you have questions or need follow up information about today's clinic visit or your schedule please contact [facility] directly at [phone].  Normal or non-critical lab and imaging results will be communicated to you by [employer], letter or phone within 4 business days after the clinic has received the results. If you do not hear from us within 7 days, please contact the clinic through [employer] or phone. If you have a critical or abnormal lab result, we will notify you by phone as soon as possible.  Submit refill requests through [employer] or call your pharmacy and they will forward the refill request to us. Please allow 3 business days for your refill to be completed.          Additional Information About Your Visit        [employer] Information     [employer] gives you secure access to your electronic health record. If you see a primary care provider, you can also send messages to your care team and make appointments. If you have questions, please call your primary care clinic.  If you do not have a primary care provider, please call [phone] and they will assist you.        Care EveryWhere ID     This is your Care EveryWhere ID. This could be used by other organizations to access your [place] medical records  [id number]       "  Your Vitals Were     Pulse Height Last Period Pulse Oximetry BMI (Body Mass Index)       118 5' 4\" (1.626 m) 07/03/2017 97% 38.38 kg/m2        Blood Pressure from Last 3 Encounters:   03/19/18 (!) 132/92   03/13/18 124/89   02/28/18 110/70    Weight from Last 3 Encounters:   03/19/18 223 lb 9.6 oz (101.4 kg)   03/13/18 221 lb 3.2 oz (100.3 kg)   02/28/18 214 lb (97.1 kg)              We Performed the Following     ALT     AST     CBC with platelets     Creatinine urine calculation only     MAT FETAL MED CTR REFERRAL-PREGNANCY     Protein  random urine with Creat Ratio        Primary Care Provider Office Phone # Fax #    [place] Witt [phone] [address] [address]        Equal Access to Services     [facility] : Hadii aad ku hadasho Soomaali, waaxda luqadaha, qaybta kaalmada adeegyada, waxay freddiein haysohailn thomas silvan . So [facility] [phone].    ATENCIÓN: Si habla español, tiene a schulz disposición servicios gratuitos de asistencia lingüística. Deanna al [phone].    We comply with applicable federal civil rights laws and [place] laws. We do not discriminate on the basis of race, color, national origin, age, disability, sex, sexual orientation, or gender identity.            Thank you!     Thank you for choosing [facility]  for your care. Our goal is always to provide you with excellent care. Hearing back from our patients is one way we can continue to improve our services. Please take a few minutes to complete the written survey that you may receive in the mail after your visit with us. Thank you!             Your Updated Medication List - Protect others around you: Learn how to safely use, store and throw away your medicines at www.disposemymeds.org.          This list is accurate as of 3/19/18  7:02 PM.  Always use your most recent med list.                   Brand Name Dispense Instructions for use Diagnosis    MAGNESIUM " OXIDE PO           PRENATAL 1 PO

## 2018-03-19 NOTE — PROGRESS NOTES
Yris BP is elevated today with diastolic at 92.  Reviewed past clinical exam records pre-pregnancy at [facility] and Yris had elevated BP at those visit > 130/80 but not criteria for HTN class I.   Also elevated BP at 1st visit without significant change in BP during 2nd trimester due to physiology of pregnancy.  Denies HA.  Positive edema.  Reflexes 2+ bilaterally.  Painful stretch marks vs PUPP's.   ASSESSMENT: 37w0d   Excessive wt gain in preg, Elevated BP in pregnancy from essential HTN mild vs GHTN.  LGA infant.  PLAN: Reviewed findings with pt on different mgt based on presence of proteinuria and labs.  Will do labs today and reassess in 3-4 days.  US at [facility] for BPP and EFW and follow BPP for essential HTN based on review of pre-pregnancy record.  PATRICIA

## 2018-03-22 ENCOUNTER — PRENATAL OFFICE VISIT (OUTPATIENT)
Dept: MIDWIFE SERVICES | Facility: CLINIC | Age: 33
End: 2018-03-22
Payer: COMMERCIAL

## 2018-03-22 ENCOUNTER — HOSPITAL ENCOUNTER (OUTPATIENT)
Dept: ULTRASOUND IMAGING | Facility: CLINIC | Age: 33
Discharge: HOME | End: 2018-03-22
Attending: ADVANCED PRACTICE MIDWIFE | Admitting: ADVANCED PRACTICE MIDWIFE
Payer: COMMERCIAL

## 2018-03-22 ENCOUNTER — OFFICE VISIT (OUTPATIENT)
Dept: MATERNAL FETAL MEDICINE | Facility: CLINIC | Age: 33
End: 2018-03-22
Attending: ADVANCED PRACTICE MIDWIFE
Payer: COMMERCIAL

## 2018-03-22 VITALS
OXYGEN SATURATION: 99 % | HEIGHT: 64 IN | SYSTOLIC BLOOD PRESSURE: 138 MMHG | BODY MASS INDEX: 38.41 KG/M2 | HEART RATE: 114 BPM | WEIGHT: 225 LBS | DIASTOLIC BLOOD PRESSURE: 88 MMHG

## 2018-03-22 DIAGNOSIS — Z34.03 ENCOUNTER FOR SUPERVISION OF NORMAL FIRST PREGNANCY IN THIRD TRIMESTER: Primary | ICD-10-CM

## 2018-03-22 DIAGNOSIS — O10.919 CHRONIC HYPERTENSION AFFECTING PREGNANCY: Primary | ICD-10-CM

## 2018-03-22 DIAGNOSIS — O26.90 PREGNANCY RELATED CONDITION, ANTEPARTUM: ICD-10-CM

## 2018-03-22 DIAGNOSIS — Z01.818 PRE-OP EXAM: Primary | ICD-10-CM

## 2018-03-22 PROCEDURE — 76816 OB US FOLLOW-UP PER FETUS: CPT

## 2018-03-22 PROCEDURE — 99207 ZZC PRENATAL VISIT: CPT | Performed by: ADVANCED PRACTICE MIDWIFE

## 2018-03-22 PROCEDURE — 76819 FETAL BIOPHYS PROFIL W/O NST: CPT | Performed by: ADVANCED PRACTICE MIDWIFE

## 2018-03-22 NOTE — PROGRESS NOTES
37w3d  Pt seen at [facility] today with U/S for gestional hypertension, it was noted that fetus was breech.  EFW of  8 lbs 9 oz.  Discussed at length with patient about version v. Scheduled C/S. Patient will try acupuncture/moxibustion and chiropractic adjustments but has decided to not attempt version given size of fetus. Patient would like ultrasound to confirm breech at hospital prior to C/S.  Sent to surgery scheduler and will see  Dr. aBrbour for prenatal visit before C/S. C/S scheduled for 4/30/18.  All baseline preeclamptic labs WNL rtc in 1 week. jonelle

## 2018-03-22 NOTE — MR AVS SNAPSHOT
After Visit Summary   3/22/2018    Yris Johnson    MRN: [id number]           Patient Information     Date Of Birth          1985        Visit Information        Provider Department      3/22/2018 10:00 AM Ruddy Thurston MD [facility] Maternal Fetal Medicine [facility]        Today's Diagnoses     Chronic hypertension affecting pregnancy    -  1       Follow-ups after your visit        Your next 10 appointments already scheduled     Mar 27, 2018  3:15 PM CDT   ESTABLISHED PRENATAL with Kenia Barbour MD   [facility] ([facility])    [address] [place] [address] [phone]            Mar 30, 2018   Procedure with Kenia Barbour MD   [facility] (--)    [address] [place] [address] Ave  [place] [address] [phone]              Future tests that were ordered for you today     Open Future Orders        Priority Expected Expires Ordered    ABO/Rh type and screen Routine  4/22/2018 3/22/2018    Anti Treponema Routine  4/22/2018 3/22/2018    CBC with platelets Routine  4/22/2018 3/22/2018            Who to contact     If you have questions or need follow up information about today's clinic visit or your schedule please contact [facility]ER[facility] directly at [phone].  Normal or non-critical lab and imaging results will be communicated to you by MyChart, letter or phone within 4 business days after the clinic has received the results. If you do not hear from us within 7 days, please contact the clinic through MyChart or phone. If you have a critical or abnormal lab result, we will notify you by phone as soon as possible.  Submit refill requests through [employer] or call your pharmacy and they will forward the refill request to us. Please allow 3 business days for your refill to be completed.          Additional Information About Your Visit        [employer] Information     [employer] gives you secure access to your  electronic health record. If you see a primary care provider, you can also send messages to your care team and make appointments. If you have questions, please call your primary care clinic.  If you do not have a primary care provider, please call [phone] and they will assist you.        Care EveryWhere ID     This is your Care EveryWhere ID. This could be used by other organizations to access your [place] medical records  [id number]        Your Vitals Were     Last Period                   07/03/2017            Blood Pressure from Last 3 Encounters:   03/22/18 138/88   03/19/18 (!) 132/92   03/13/18 124/89    Weight from Last 3 Encounters:   03/22/18 102.1 kg (225 lb)   03/19/18 101.4 kg (223 lb 9.6 oz)   03/13/18 100.3 kg (221 lb 3.2 oz)              Today, you had the following     No orders found for display       Primary Care Provider Office Phone # Fax #    [place]nn Witt [phone]       [address] [address]        Equal Access to Services     [facility]: Hadii dana ku saba Sojaqueline, waaxda luhakan, qaybta kaalivette ashraf, phani starr . So [facility] [phone].    ATENCIÓN: Si habla español, tiene a schulz disposición servicios gratsiennaos de asistencia lingüística. [facility] [phone].    We comply with applicable federal civil rights laws and [place] laws. We do not discriminate on the basis of race, color, national origin, age, disability, sex, sexual orientation, or gender identity.            Thank you!     Thank you for choosing [facility]  for your care. Our goal is always to provide you with excellent care. Hearing back from our patients is one way we can continue to improve our services. Please take a few minutes to complete the written survey that you may receive in the mail after your visit with us. Thank you!             Your Updated Medication List - Protect others around  you: Learn how to safely use, store and throw away your medicines at www.disposemymeds.org.          This list is accurate as of 3/22/18  3:12 PM.  Always use your most recent med list.                   Brand Name Dispense Instructions for use Diagnosis    MAGNESIUM OXIDE PO           PRENATAL 1 PO

## 2018-03-22 NOTE — PROGRESS NOTES
"Please see \"Imaging\" tab under \"Chart Review\" for details of today's visit.    Ruddy Thurston    "

## 2018-03-27 ENCOUNTER — PRENATAL OFFICE VISIT (OUTPATIENT)
Dept: OBGYN | Facility: CLINIC | Age: 33
End: 2018-03-27
Payer: COMMERCIAL

## 2018-03-27 VITALS
HEART RATE: 80 BPM | WEIGHT: 223 LBS | DIASTOLIC BLOOD PRESSURE: 82 MMHG | SYSTOLIC BLOOD PRESSURE: 130 MMHG | TEMPERATURE: 97.7 F | BODY MASS INDEX: 38.28 KG/M2

## 2018-03-27 DIAGNOSIS — Z01.818 PRE-OP EXAM: ICD-10-CM

## 2018-03-27 DIAGNOSIS — Z34.03 ENCOUNTER FOR SUPERVISION OF NORMAL FIRST PREGNANCY IN THIRD TRIMESTER: ICD-10-CM

## 2018-03-27 LAB
ERYTHROCYTE [DISTWIDTH] IN BLOOD BY AUTOMATED COUNT: 13.6 % (ref 10–15)
HCT VFR BLD AUTO: 38.9 % (ref 35–47)
HGB BLD-MCNC: 13.1 G/DL (ref 11.7–15.7)
MCH RBC QN AUTO: 29.1 PG (ref 26.5–33)
MCHC RBC AUTO-ENTMCNC: 33.7 G/DL (ref 31.5–36.5)
MCV RBC AUTO: 86 FL (ref 78–100)
PLATELET # BLD AUTO: 224 10E9/L (ref 150–450)
RBC # BLD AUTO: 4.5 10E12/L (ref 3.8–5.2)
WBC # BLD AUTO: 10.4 10E9/L (ref 4–11)

## 2018-03-27 PROCEDURE — 99207 ZZC PRENATAL VISIT: CPT | Performed by: OBSTETRICS & GYNECOLOGY

## 2018-03-27 PROCEDURE — 86780 TREPONEMA PALLIDUM: CPT | Performed by: OBSTETRICS & GYNECOLOGY

## 2018-03-27 PROCEDURE — 85027 COMPLETE CBC AUTOMATED: CPT | Performed by: OBSTETRICS & GYNECOLOGY

## 2018-03-27 PROCEDURE — 86901 BLOOD TYPING SEROLOGIC RH(D): CPT | Performed by: OBSTETRICS & GYNECOLOGY

## 2018-03-27 PROCEDURE — 86900 BLOOD TYPING SEROLOGIC ABO: CPT | Performed by: OBSTETRICS & GYNECOLOGY

## 2018-03-27 PROCEDURE — 36415 COLL VENOUS BLD VENIPUNCTURE: CPT | Performed by: OBSTETRICS & GYNECOLOGY

## 2018-03-27 PROCEDURE — 86850 RBC ANTIBODY SCREEN: CPT | Performed by: OBSTETRICS & GYNECOLOGY

## 2018-03-27 NOTE — MR AVS SNAPSHOT
After Visit Summary   3/27/2018    Yris Johnson    MRN: [id number]           Patient Information     Date Of Birth          1985        Visit Information        Provider Department      3/27/2018 3:15 PM Kenia Barbour MD [facility]        Today's Diagnoses     Breech presentation, single or unspecified fetus    -  1    Encounter for supervision of normal first pregnancy in third trimester        Pre-op exam           Follow-ups after your visit        Your next 10 appointments already scheduled     Mar 30, 2018   Procedure with Kenia Barbour MD   [facility] (--)    [address]  [place] [address] [phone]              Who to contact     If you have questions or need follow up information about today's clinic visit or your schedule please contact [facility] directly at [phone].  Normal or non-critical lab and imaging results will be communicated to you by MyChart, letter or phone within 4 business days after the clinic has received the results. If you do not hear from us within 7 days, please contact the clinic through MyChart or phone. If you have a critical or abnormal lab result, we will notify you by phone as soon as possible.  Submit refill requests through [employer] or call your pharmacy and they will forward the refill request to us. Please allow 3 business days for your refill to be completed.          Additional Information About Your Visit        MyChart Information     [employer] gives you secure access to your electronic health record. If you see a primary care provider, you can also send messages to your care team and make appointments. If you have questions, please call your primary care clinic.  If you do not have a primary care provider, please call [phone] and they will assist you.        Care EveryWhere ID     This is your Care EveryWhere ID. This could be used by other organizations to access your [facility]  records  [id number]        Your Vitals Were     Pulse Temperature Last Period BMI (Body Mass Index)          80 97.7  F (36.5  C) (Oral) 07/03/2017 38.28 kg/m2         Blood Pressure from Last 3 Encounters:   03/27/18 130/82   03/22/18 138/88   03/19/18 (!) 132/92    Weight from Last 3 Encounters:   03/27/18 223 lb (101.2 kg)   03/22/18 225 lb (102.1 kg)   03/19/18 223 lb 9.6 oz (101.4 kg)              We Performed the Following     ABO/Rh type and screen     Anti Treponema     CBC with platelets        Primary Care Provider Office Phone # Fax #    [place]nn Witt [phone] [address] [address]        Equal Access to Services     [facility] : Hadii dana Harmon, waaxelana weiner, qaybta kaalmaelana ashraf, phani starr . So [facility] [phone].    ATENCIÓN: Si habla español, tiene a schulz disposición servicios gratuitos de asistencia lingüística. Akhilame al [phone].    We comply with applicable federal civil rights laws and [place] laws. We do not discriminate on the basis of race, color, national origin, age, disability, sex, sexual orientation, or gender identity.            Thank you!     Thank you for choosing [facility]  for your care. Our goal is always to provide you with excellent care. Hearing back from our patients is one way we can continue to improve our services. Please take a few minutes to complete the written survey that you may receive in the mail after your visit with us. Thank you!             Your Updated Medication List - Protect others around you: Learn how to safely use, store and throw away your medicines at www.disposemymeds.org.          This list is accurate as of 3/27/18 11:59 PM.  Always use your most recent med list.                   Brand Name Dispense Instructions for use Diagnosis    MAGNESIUM OXIDE PO           PRENATAL 1 PO

## 2018-03-28 LAB
ABO + RH BLD: NORMAL
ABO + RH BLD: NORMAL
BLD GP AB SCN SERPL QL: NORMAL
BLOOD BANK CMNT PATIENT-IMP: NORMAL
SPECIMEN EXP DATE BLD: NORMAL

## 2018-03-29 ENCOUNTER — ANESTHESIA EVENT (OUTPATIENT)
Dept: OBGYN | Facility: CLINIC | Age: 33
End: 2018-03-29
Payer: COMMERCIAL

## 2018-03-29 LAB — T PALLIDUM IGG+IGM SER QL: NEGATIVE

## 2018-03-30 ENCOUNTER — ANESTHESIA (OUTPATIENT)
Dept: OBGYN | Facility: CLINIC | Age: 33
End: 2018-03-30
Payer: COMMERCIAL

## 2018-03-30 ENCOUNTER — HOSPITAL ENCOUNTER (INPATIENT)
Facility: CLINIC | Age: 33
LOS: 3 days | Discharge: HOME | End: 2018-04-02
Attending: OBSTETRICS & GYNECOLOGY | Admitting: OBSTETRICS & GYNECOLOGY
Payer: COMMERCIAL

## 2018-03-30 ENCOUNTER — SURGERY (OUTPATIENT)
Age: 33
End: 2018-03-30

## 2018-03-30 DIAGNOSIS — Z98.891 S/P CESAREAN SECTION: Primary | ICD-10-CM

## 2018-03-30 PROCEDURE — 71000014 ZZH RECOVERY PHASE 1 LEVEL 2 FIRST HR: Performed by: OBSTETRICS & GYNECOLOGY

## 2018-03-30 PROCEDURE — 25000128 H RX IP 250 OP 636: Performed by: STUDENT IN AN ORGANIZED HEALTH CARE EDUCATION/TRAINING PROGRAM

## 2018-03-30 PROCEDURE — 40000170 ZZH STATISTIC PRE-PROCEDURE ASSESSMENT II: Performed by: OBSTETRICS & GYNECOLOGY

## 2018-03-30 PROCEDURE — 71000015 ZZH RECOVERY PHASE 1 LEVEL 2 EA ADDTL HR: Performed by: OBSTETRICS & GYNECOLOGY

## 2018-03-30 PROCEDURE — 25000128 H RX IP 250 OP 636: Performed by: OBSTETRICS & GYNECOLOGY

## 2018-03-30 PROCEDURE — 37000009 ZZH ANESTHESIA TECHNICAL FEE, EACH ADDTL 15 MIN: Performed by: OBSTETRICS & GYNECOLOGY

## 2018-03-30 PROCEDURE — 12000032 ZZH R&B OB CRITICAL UMMC

## 2018-03-30 PROCEDURE — 25000125 ZZHC RX 250: Performed by: OBSTETRICS & GYNECOLOGY

## 2018-03-30 PROCEDURE — 37000008 ZZH ANESTHESIA TECHNICAL FEE, 1ST 30 MIN: Performed by: OBSTETRICS & GYNECOLOGY

## 2018-03-30 PROCEDURE — 36000057 ZZH SURGERY LEVEL 3 1ST 30 MIN - UMMC: Performed by: OBSTETRICS & GYNECOLOGY

## 2018-03-30 PROCEDURE — 36000059 ZZH SURGERY LEVEL 3 EA 15 ADDTL MIN UMMC: Performed by: OBSTETRICS & GYNECOLOGY

## 2018-03-30 PROCEDURE — 27210794 ZZH OR GENERAL SUPPLY STERILE: Performed by: OBSTETRICS & GYNECOLOGY

## 2018-03-30 PROCEDURE — 59510 CESAREAN DELIVERY: CPT | Mod: GC | Performed by: OBSTETRICS & GYNECOLOGY

## 2018-03-30 PROCEDURE — 25000132 ZZH RX MED GY IP 250 OP 250 PS 637: Performed by: OBSTETRICS & GYNECOLOGY

## 2018-03-30 PROCEDURE — 40000977 ZZH STATISTIC ATTENDANCE AT DELIVERY

## 2018-03-30 PROCEDURE — 25000125 ZZHC RX 250: Performed by: STUDENT IN AN ORGANIZED HEALTH CARE EDUCATION/TRAINING PROGRAM

## 2018-03-30 PROCEDURE — C9290 INJ, BUPIVACAINE LIPOSOME: HCPCS | Performed by: STUDENT IN AN ORGANIZED HEALTH CARE EDUCATION/TRAINING PROGRAM

## 2018-03-30 RX ORDER — CEFAZOLIN SODIUM 1 G
1 VIAL (EA) INJECTION SEE ADMIN INSTRUCTIONS
Status: DISCONTINUED | OUTPATIENT
Start: 2018-03-30 | End: 2018-03-30 | Stop reason: HOSPADM

## 2018-03-30 RX ORDER — HYDROCORTISONE 2.5 %
CREAM (GRAM) TOPICAL 3 TIMES DAILY PRN
Status: DISCONTINUED | OUTPATIENT
Start: 2018-03-30 | End: 2018-04-02 | Stop reason: HOSPADM

## 2018-03-30 RX ORDER — KETOROLAC TROMETHAMINE 30 MG/ML
30 INJECTION, SOLUTION INTRAMUSCULAR; INTRAVENOUS EVERY 6 HOURS
Status: COMPLETED | OUTPATIENT
Start: 2018-03-30 | End: 2018-03-31

## 2018-03-30 RX ORDER — MORPHINE SULFATE 1 MG/ML
INJECTION, SOLUTION EPIDURAL; INTRATHECAL; INTRAVENOUS PRN
Status: DISCONTINUED | OUTPATIENT
Start: 2018-03-30 | End: 2018-03-30

## 2018-03-30 RX ORDER — OXYTOCIN/0.9 % SODIUM CHLORIDE 30/500 ML
100 PLASTIC BAG, INJECTION (ML) INTRAVENOUS CONTINUOUS
Status: DISCONTINUED | OUTPATIENT
Start: 2018-03-30 | End: 2018-04-02 | Stop reason: HOSPADM

## 2018-03-30 RX ORDER — MORPHINE SULFATE 1 MG/ML
INJECTION, SOLUTION EPIDURAL; INTRATHECAL; INTRAVENOUS
Status: DISCONTINUED
Start: 2018-03-30 | End: 2018-03-30 | Stop reason: HOSPADM

## 2018-03-30 RX ORDER — ONDANSETRON 4 MG/1
4 TABLET, ORALLY DISINTEGRATING ORAL EVERY 30 MIN PRN
Status: DISCONTINUED | OUTPATIENT
Start: 2018-03-30 | End: 2018-03-30 | Stop reason: HOSPADM

## 2018-03-30 RX ORDER — BUPIVACAINE HYDROCHLORIDE 7.5 MG/ML
INJECTION, SOLUTION INTRASPINAL PRN
Status: DISCONTINUED | OUTPATIENT
Start: 2018-03-30 | End: 2018-03-30

## 2018-03-30 RX ORDER — NALOXONE HYDROCHLORIDE 0.4 MG/ML
.1-.4 INJECTION, SOLUTION INTRAMUSCULAR; INTRAVENOUS; SUBCUTANEOUS
Status: DISCONTINUED | OUTPATIENT
Start: 2018-03-30 | End: 2018-03-30

## 2018-03-30 RX ORDER — IBUPROFEN 400 MG/1
400 TABLET, FILM COATED ORAL EVERY 6 HOURS PRN
Status: DISCONTINUED | OUTPATIENT
Start: 2018-03-30 | End: 2018-04-02 | Stop reason: HOSPADM

## 2018-03-30 RX ORDER — IBUPROFEN 600 MG/1
600 TABLET, FILM COATED ORAL EVERY 6 HOURS PRN
Status: DISCONTINUED | OUTPATIENT
Start: 2018-03-30 | End: 2018-04-02 | Stop reason: HOSPADM

## 2018-03-30 RX ORDER — AMOXICILLIN 250 MG
2 CAPSULE ORAL 2 TIMES DAILY PRN
Status: DISCONTINUED | OUTPATIENT
Start: 2018-03-30 | End: 2018-04-02 | Stop reason: HOSPADM

## 2018-03-30 RX ORDER — NALBUPHINE HYDROCHLORIDE 10 MG/ML
2.5-5 INJECTION, SOLUTION INTRAMUSCULAR; INTRAVENOUS; SUBCUTANEOUS EVERY 6 HOURS PRN
Status: DISCONTINUED | OUTPATIENT
Start: 2018-03-30 | End: 2018-03-30

## 2018-03-30 RX ORDER — ACETAMINOPHEN 325 MG/1
650 TABLET ORAL EVERY 4 HOURS PRN
Status: DISCONTINUED | OUTPATIENT
Start: 2018-04-02 | End: 2018-04-02 | Stop reason: HOSPADM

## 2018-03-30 RX ORDER — FENTANYL CITRATE 50 UG/ML
25-50 INJECTION, SOLUTION INTRAMUSCULAR; INTRAVENOUS
Status: DISCONTINUED | OUTPATIENT
Start: 2018-03-30 | End: 2018-03-30 | Stop reason: HOSPADM

## 2018-03-30 RX ORDER — LANOLIN 100 %
OINTMENT (GRAM) TOPICAL
Status: DISCONTINUED | OUTPATIENT
Start: 2018-03-30 | End: 2018-04-02 | Stop reason: HOSPADM

## 2018-03-30 RX ORDER — CITRIC ACID/SODIUM CITRATE 334-500MG
30 SOLUTION, ORAL ORAL
Status: COMPLETED | OUTPATIENT
Start: 2018-03-30 | End: 2018-03-30

## 2018-03-30 RX ORDER — SODIUM CHLORIDE, SODIUM LACTATE, POTASSIUM CHLORIDE, CALCIUM CHLORIDE 600; 310; 30; 20 MG/100ML; MG/100ML; MG/100ML; MG/100ML
INJECTION, SOLUTION INTRAVENOUS CONTINUOUS
Status: DISCONTINUED | OUTPATIENT
Start: 2018-03-30 | End: 2018-03-30 | Stop reason: HOSPADM

## 2018-03-30 RX ORDER — LABETALOL HYDROCHLORIDE 5 MG/ML
10 INJECTION, SOLUTION INTRAVENOUS
Status: DISCONTINUED | OUTPATIENT
Start: 2018-03-30 | End: 2018-03-30 | Stop reason: HOSPADM

## 2018-03-30 RX ORDER — IBUPROFEN 600 MG/1
600 TABLET, FILM COATED ORAL EVERY 6 HOURS PRN
Qty: 60 TABLET | Refills: 0 | Status: SHIPPED | OUTPATIENT
Start: 2018-03-30 | End: 2022-06-16

## 2018-03-30 RX ORDER — MISOPROSTOL 200 UG/1
400 TABLET ORAL
Status: DISCONTINUED | OUTPATIENT
Start: 2018-03-30 | End: 2018-04-02 | Stop reason: HOSPADM

## 2018-03-30 RX ORDER — LIDOCAINE 40 MG/G
CREAM TOPICAL
Status: DISCONTINUED | OUTPATIENT
Start: 2018-03-30 | End: 2018-03-30

## 2018-03-30 RX ORDER — OXYCODONE HYDROCHLORIDE 5 MG/1
5-10 TABLET ORAL
Status: DISCONTINUED | OUTPATIENT
Start: 2018-03-30 | End: 2018-04-02 | Stop reason: HOSPADM

## 2018-03-30 RX ORDER — OXYTOCIN/0.9 % SODIUM CHLORIDE 30/500 ML
PLASTIC BAG, INJECTION (ML) INTRAVENOUS CONTINUOUS PRN
Status: DISCONTINUED | OUTPATIENT
Start: 2018-03-30 | End: 2018-03-30

## 2018-03-30 RX ORDER — ACETAMINOPHEN 325 MG/1
975 TABLET ORAL EVERY 8 HOURS
Status: COMPLETED | OUTPATIENT
Start: 2018-03-30 | End: 2018-04-02

## 2018-03-30 RX ORDER — OXYTOCIN/0.9 % SODIUM CHLORIDE 30/500 ML
340 PLASTIC BAG, INJECTION (ML) INTRAVENOUS CONTINUOUS PRN
Status: DISCONTINUED | OUTPATIENT
Start: 2018-03-30 | End: 2018-04-02 | Stop reason: HOSPADM

## 2018-03-30 RX ORDER — ACETAMINOPHEN 325 MG/1
650 TABLET ORAL EVERY 4 HOURS PRN
Qty: 60 TABLET | Refills: 0 | Status: SHIPPED | OUTPATIENT
Start: 2018-04-02 | End: 2023-05-31

## 2018-03-30 RX ORDER — OXYTOCIN 10 [USP'U]/ML
10 INJECTION, SOLUTION INTRAMUSCULAR; INTRAVENOUS
Status: DISCONTINUED | OUTPATIENT
Start: 2018-03-30 | End: 2018-04-02 | Stop reason: HOSPADM

## 2018-03-30 RX ORDER — DIPHENHYDRAMINE HCL 25 MG
25 CAPSULE ORAL EVERY 6 HOURS PRN
Status: DISCONTINUED | OUTPATIENT
Start: 2018-03-30 | End: 2018-04-02 | Stop reason: HOSPADM

## 2018-03-30 RX ORDER — EPHEDRINE SULFATE 50 MG/ML
5 INJECTION, SOLUTION INTRAMUSCULAR; INTRAVENOUS; SUBCUTANEOUS
Status: DISCONTINUED | OUTPATIENT
Start: 2018-03-30 | End: 2018-03-30

## 2018-03-30 RX ORDER — ONDANSETRON 2 MG/ML
4 INJECTION INTRAMUSCULAR; INTRAVENOUS EVERY 6 HOURS PRN
Status: DISCONTINUED | OUTPATIENT
Start: 2018-03-30 | End: 2018-04-02 | Stop reason: HOSPADM

## 2018-03-30 RX ORDER — DEXTROSE, SODIUM CHLORIDE, SODIUM LACTATE, POTASSIUM CHLORIDE, AND CALCIUM CHLORIDE 5; .6; .31; .03; .02 G/100ML; G/100ML; G/100ML; G/100ML; G/100ML
INJECTION, SOLUTION INTRAVENOUS CONTINUOUS
Status: DISCONTINUED | OUTPATIENT
Start: 2018-03-30 | End: 2018-04-02 | Stop reason: HOSPADM

## 2018-03-30 RX ORDER — KETOROLAC TROMETHAMINE 30 MG/ML
INJECTION, SOLUTION INTRAMUSCULAR; INTRAVENOUS PRN
Status: DISCONTINUED | OUTPATIENT
Start: 2018-03-30 | End: 2018-03-30

## 2018-03-30 RX ORDER — IBUPROFEN 800 MG/1
800 TABLET, FILM COATED ORAL EVERY 6 HOURS PRN
Status: DISCONTINUED | OUTPATIENT
Start: 2018-03-30 | End: 2018-04-02 | Stop reason: HOSPADM

## 2018-03-30 RX ORDER — NALOXONE HYDROCHLORIDE 0.4 MG/ML
.1-.4 INJECTION, SOLUTION INTRAMUSCULAR; INTRAVENOUS; SUBCUTANEOUS
Status: ACTIVE | OUTPATIENT
Start: 2018-03-30 | End: 2018-03-31

## 2018-03-30 RX ORDER — DIPHENHYDRAMINE HYDROCHLORIDE 50 MG/ML
25 INJECTION INTRAMUSCULAR; INTRAVENOUS EVERY 6 HOURS PRN
Status: DISCONTINUED | OUTPATIENT
Start: 2018-03-30 | End: 2018-04-02 | Stop reason: HOSPADM

## 2018-03-30 RX ORDER — HYDROMORPHONE HYDROCHLORIDE 1 MG/ML
.3-.5 INJECTION, SOLUTION INTRAMUSCULAR; INTRAVENOUS; SUBCUTANEOUS EVERY 5 MIN PRN
Status: DISCONTINUED | OUTPATIENT
Start: 2018-03-30 | End: 2018-03-30 | Stop reason: HOSPADM

## 2018-03-30 RX ORDER — ONDANSETRON 2 MG/ML
INJECTION INTRAMUSCULAR; INTRAVENOUS PRN
Status: DISCONTINUED | OUTPATIENT
Start: 2018-03-30 | End: 2018-03-30

## 2018-03-30 RX ORDER — AMOXICILLIN 250 MG
1 CAPSULE ORAL 2 TIMES DAILY PRN
Status: DISCONTINUED | OUTPATIENT
Start: 2018-03-30 | End: 2018-04-02 | Stop reason: HOSPADM

## 2018-03-30 RX ORDER — SIMETHICONE 80 MG
80 TABLET,CHEWABLE ORAL 4 TIMES DAILY PRN
Status: DISCONTINUED | OUTPATIENT
Start: 2018-03-30 | End: 2018-04-02 | Stop reason: HOSPADM

## 2018-03-30 RX ORDER — CEFAZOLIN SODIUM 2 G/100ML
2 INJECTION, SOLUTION INTRAVENOUS
Status: COMPLETED | OUTPATIENT
Start: 2018-03-30 | End: 2018-03-30

## 2018-03-30 RX ORDER — BISACODYL 10 MG
10 SUPPOSITORY, RECTAL RECTAL DAILY PRN
Status: DISCONTINUED | OUTPATIENT
Start: 2018-04-01 | End: 2018-04-02 | Stop reason: HOSPADM

## 2018-03-30 RX ORDER — NALOXONE HYDROCHLORIDE 0.4 MG/ML
.1-.4 INJECTION, SOLUTION INTRAMUSCULAR; INTRAVENOUS; SUBCUTANEOUS
Status: DISCONTINUED | OUTPATIENT
Start: 2018-03-30 | End: 2018-04-02 | Stop reason: HOSPADM

## 2018-03-30 RX ORDER — OXYCODONE HYDROCHLORIDE 5 MG/1
5-10 TABLET ORAL
Qty: 18 TABLET | Refills: 0 | Status: SHIPPED | OUTPATIENT
Start: 2018-03-30 | End: 2018-04-02

## 2018-03-30 RX ORDER — AMOXICILLIN 250 MG
1 CAPSULE ORAL 2 TIMES DAILY PRN
Qty: 100 TABLET | Refills: 0 | Status: SHIPPED | OUTPATIENT
Start: 2018-03-30 | End: 2019-04-03

## 2018-03-30 RX ORDER — ONDANSETRON 2 MG/ML
4 INJECTION INTRAMUSCULAR; INTRAVENOUS EVERY 30 MIN PRN
Status: DISCONTINUED | OUTPATIENT
Start: 2018-03-30 | End: 2018-03-30 | Stop reason: HOSPADM

## 2018-03-30 RX ORDER — LIDOCAINE 40 MG/G
CREAM TOPICAL
Status: DISCONTINUED | OUTPATIENT
Start: 2018-03-30 | End: 2018-04-02 | Stop reason: HOSPADM

## 2018-03-30 RX ORDER — BUPIVACAINE HYDROCHLORIDE 2.5 MG/ML
INJECTION, SOLUTION EPIDURAL; INFILTRATION; INTRACAUDAL PRN
Status: DISCONTINUED | OUTPATIENT
Start: 2018-03-30 | End: 2018-03-30

## 2018-03-30 RX ADMIN — SODIUM CHLORIDE, POTASSIUM CHLORIDE, SODIUM LACTATE AND CALCIUM CHLORIDE 1000 ML: 600; 310; 30; 20 INJECTION, SOLUTION INTRAVENOUS at 09:05

## 2018-03-30 RX ADMIN — SODIUM CHLORIDE, POTASSIUM CHLORIDE, SODIUM LACTATE AND CALCIUM CHLORIDE: 600; 310; 30; 20 INJECTION, SOLUTION INTRAVENOUS at 10:08

## 2018-03-30 RX ADMIN — PHENYLEPHRINE HYDROCHLORIDE 0.5 MCG/KG/MIN: 10 INJECTION, SOLUTION INTRAMUSCULAR; INTRAVENOUS; SUBCUTANEOUS at 10:06

## 2018-03-30 RX ADMIN — KETOROLAC TROMETHAMINE 30 MG: 30 INJECTION, SOLUTION INTRAMUSCULAR at 17:48

## 2018-03-30 RX ADMIN — SODIUM CHLORIDE, SODIUM LACTATE, POTASSIUM CHLORIDE, CALCIUM CHLORIDE AND DEXTROSE MONOHYDRATE: 5; 600; 310; 30; 20 INJECTION, SOLUTION INTRAVENOUS at 17:45

## 2018-03-30 RX ADMIN — OXYTOCIN-SODIUM CHLORIDE 0.9% IV SOLN 30 UNIT/500ML 300 ML/HR: 30-0.9/5 SOLUTION at 10:30

## 2018-03-30 RX ADMIN — ONDANSETRON 4 MG: 2 INJECTION INTRAMUSCULAR; INTRAVENOUS at 10:36

## 2018-03-30 RX ADMIN — SODIUM CITRATE AND CITRIC ACID MONOHYDRATE 30 ML: 500; 334 SOLUTION ORAL at 09:45

## 2018-03-30 RX ADMIN — SODIUM CHLORIDE, POTASSIUM CHLORIDE, SODIUM LACTATE AND CALCIUM CHLORIDE: 600; 310; 30; 20 INJECTION, SOLUTION INTRAVENOUS at 09:15

## 2018-03-30 RX ADMIN — BUPIVACAINE HYDROCHLORIDE 10 ML: 2.5 INJECTION, SOLUTION EPIDURAL; INFILTRATION; INTRACAUDAL at 11:32

## 2018-03-30 RX ADMIN — MORPHINE SULFATE 0.15 MG: 1 INJECTION EPIDURAL; INTRATHECAL; INTRAVENOUS at 10:06

## 2018-03-30 RX ADMIN — Medication 5 MG: at 10:49

## 2018-03-30 RX ADMIN — CEFAZOLIN SODIUM 2 G: 2 INJECTION, SOLUTION INTRAVENOUS at 10:10

## 2018-03-30 RX ADMIN — OXYTOCIN-SODIUM CHLORIDE 0.9% IV SOLN 30 UNIT/500ML 100 ML/HR: 30-0.9/5 SOLUTION at 12:54

## 2018-03-30 RX ADMIN — KETOROLAC TROMETHAMINE 30 MG: 30 INJECTION, SOLUTION INTRAMUSCULAR at 10:47

## 2018-03-30 RX ADMIN — BUPIVACAINE HYDROCHLORIDE IN DEXTROSE 1.6 ML: 7.5 INJECTION, SOLUTION SUBARACHNOID at 10:06

## 2018-03-30 RX ADMIN — BUPIVACAINE 20 ML: 13.3 INJECTION, SUSPENSION, LIPOSOMAL INFILTRATION at 11:32

## 2018-03-30 RX ADMIN — ACETAMINOPHEN 975 MG: 325 TABLET ORAL at 16:07

## 2018-03-30 RX ADMIN — SENNOSIDES AND DOCUSATE SODIUM 1 TABLET: 8.6; 5 TABLET ORAL at 19:54

## 2018-03-30 NOTE — OP NOTE
[facility]  Full Operative Progress Note     Surgery Date:  3/30/2018    Surgeon:  Kenia Barbour MD    Assistants:  Marisela Monsalve MD, PGY-3    Pre-op Diagnosis:    1.  at 38w4d  2. Breech presentation  3. Chronic hypertension  4. Generalized anxiety disorder     Post-op Diagnosis:    1.  s/p delivery as noted below  2. Breech presentation  3. Chronic hypertension  4. Generalized anxiety disorder    Procedure:  Primary low-transverse  section with double layer uterine closure via pfannenstiel skin incision    Anesthesia: spinal    QBL:  1258 cc    IVF:  1400 cc crystalloid    UOP:  200 cc clear urine at the end of the case    Drains: Espinal Catheter     Specimens:  Cord blood    Complications:  None apparent    Indications:   Yris Johnson is a 32 year old  at 38w4d admitted for scheduled primary  section due to breech presentation in pregnancy otherwise complicated by chronic hypertension.  The risks, benefits, and alternatives of  section were discussed with the patient, and she agreed to proceed.     Findings:   1. No fascial or intraabdominal adhesions  2. Clear amniotic fluid  3. Liveborn female infant in torrie breech presentation at 1028 on 3/30/2018. Apgars 9 at 1 minute & 9 at 5 minutes. Weight 3710 g.  4. Normal uterus, fallopian tubes, and ovaries.     Procedure Details:   The patient was brought to the OR, where adequate spinal anesthesia was administered.  She was placed in the dorsal supine position with a slight leftward tilt. She was prepped and draped in the usual sterile fashion. A surgical time out was performed. A pfannenstiel skin incision was made with the scalpel, and carried down to the underlying fascia with sharp and blunt dissection. The fascia was incised in the midline, and the incision was extended laterally with the Harris scissors. The superior aspect of the fascia was grasped with the Kocher clamps and dissected  off of the underlying rectus muscles with blunt and sharp dissection. Attention was then turned to the inferior aspect of the fascia, which was similarly dissected off of the underlying rectus muscles. The rectus muscles were  in the midline, and the peritoneum was entered bluntly, and the opening was extended with digital pressure. The bladder blade was placed. The vesicouterine peritoneum was incised in the midline, and the incision was extended laterally with the Metzenbaum scissors. A bladder flap was created digitally and the bladder blade was replaced. A transverse hysterotomy was made with the scalpel in the lower uterine segment, and the incision was extended with digital pressure. The infant was noted to be in the torrie breech position. The breech was elevated to the hysterotomy and the infant delivered atraumatically with standard breech maneuvers. The cord was doubly clamped and cut, and the infant was handed off to the awaiting nursery staff. A segment of cord was cut and held for gases. The placenta was delivered with gentle traction on the umbilical cord and uterine massage. The uterus was exteriorized and cleared of all clots and debris. Uterine tone was noted to be firm with 40 units of pitocin given through the running IV and uterine massage.  The hysterotomy was closed with a running locked suture of 0 Vicryl.  The hysterotomy was then horizontally imbricated using an 0 Vicryl suture. The hysterotomy was noted to be hemostatic. The posterior cul-de-sac was cleared of all clots and debris. The uterus was returned to the abdomen. The pericolic gutters were cleared of all clots and debris. The hysterotomy was reexamined and noted to be hemostatic. The fascia and rectus muscles were examined and areas of oozing were controlled with electrocautery. The fascia was closed with a running 0 Vicryl suture. The subcutaneous tissue was irrigated and areas of oozing were controlled with electrocautery.  The subcutaneous tissue was less than 2 cm in thickness, and was therefore closed. The skin was closed with 4-0 Monocryl and covered with a sterile dressing.    All sponge, needle, and instrument counts were correct. The patient tolerated the procedure well, and was transferred to recovery in stable condition. Dr. Kenia Barbour was present and scrubbed for the entirety of the procedure.     Marisela Monsalve MD  OBN PGY-3  [phone]  11:17 AM 3/30/2018

## 2018-03-30 NOTE — IP AVS SNAPSHOT
[facility]    [address] [facility] [address]    Phone:  [phone]                                       After Visit Summary   3/30/2018    Yris Johnson    MRN: [id number]           After Visit Summary Signature Page     I have received my discharge instructions, and my questions have been answered. I have discussed any challenges I see with this plan with the nurse or doctor.    ..........................................................................................................................................  Patient/Patient Representative Signature      ..........................................................................................................................................  Patient Representative Print Name and Relationship to Patient    ..................................................               ................................................  Date                                            Time    ..........................................................................................................................................  Reviewed by Signature/Title    ...................................................              ..............................................  Date                                                            Time

## 2018-03-30 NOTE — PLAN OF CARE
Problem: Surgery Nonspecified (Adult)  Goal: Signs and Symptoms of Listed Potential Problems Will be Absent, Minimized or Managed (Surgery Nonspecified)  Signs and symptoms of listed potential problems will be absent, minimized or managed by discharge/transition of care (reference Surgery Nonspecified (Adult) CPG).   Outcome: Improving  OR to PACU Transfer Note  Data: Pt to OB PACU at 1111 via cart. PIV infusing NS with pitocin. Pt without complications, gray with clear urine to gravity, temp 97.9, pt does not complain of pain and/or nausea.   Interventions: IV to pump, monitors and alarms on, warm blankets, SCD on. Questions answered. Comfort  Response: stable.  at bedside and  Laura.   Plan: Patient instructed to notify RN for pain or nausea, routine post op cares, initiate breastfeeding.

## 2018-03-30 NOTE — DISCHARGE SUMMARY
DELIVERY DISCHARGE SUMMARY    Admit date: 3/30/2018  Discharge date: 2018    Admit Dx:   -  at 38w4d gestation  - Breech presentation  - Chronic hypertension  - Generalized anxiety disorder    Discharge Dx:  -  s/p delivery as noted below  - Breech presentation  - Chronic hypertension  - Generalized anxiety disorder  - Acute blood loss anemia, expected after surgery    Procedures:  - Primary low transverse  section with double layer closure via Pfannenstiel incision  - Spinal analgesia  - TAP block    Admit HPI:  Yris Johnson is a 32 year old  at 38w4d admitted for scheduled primary  section due to breech presentation in pregnancy otherwise complicated by chronic hypertension.  The risks, benefits, and alternatives of  section were discussed with the patient, and she agreed to proceed.     Please see her admit H&P for full details of her PMH, PSH, Meds, Allergies and exam on admit.    Hospital course:  After discussion of risk and and benefits and signing informed consent the patient was taken to the operating room for primary  section.    QBL from the delivery was 1258 cc. Please see her  Section Operative Note for full details regarding her delivery. Operative findings noted:    1. No fascial or intraabdominal adhesions  2. Clear amniotic fluid  3. Liveborn female infant in torrie breech presentation at 1028 on 3/30/2018. Apgars 9 at 1 minute & 9 at 5 minutes. Weight 3710 g.  4. Normal uterus, fallopian tubes, and ovaries.     Her postoperative course was uncomplicated. On POD#2, she was meeting all of her postpartum goals and deemed stable for discharge. She was voiding without difficulty, tolerating a regular diet without nausea and vomiting, her pain was well controlled on oral pain medicines and her lochia was appropriate. Her hemoglobin prior to delivery was 13.1 and after delivery was 10.3. She was asymptomatic from acute blood loss  anemia. Her Rh status was positive and Rhogam was not indicated.    Discharge Medications:     Review of your medicines      START taking       Dose / Directions    acetaminophen 325 MG tablet   Commonly known as:  TYLENOL        Dose:  650 mg   Start taking on:  4/2/2018   Take 2 tablets (650 mg) by mouth every 4 hours as needed for mild pain   Quantity:  60 tablet   Refills:  0       ibuprofen 600 MG tablet   Commonly known as:  ADVIL/MOTRIN        Dose:  600 mg   Take 1 tablet (600 mg) by mouth every 6 hours as needed for other (cramping)   Quantity:  60 tablet   Refills:  0       oxyCODONE IR 5 MG tablet   Commonly known as:  ROXICODONE        Dose:  5-10 mg   Take 1-2 tablets (5-10 mg) by mouth every 3 hours as needed for other (pain control or improvement in physical function. Hold dose for analgesic side effects.)   Quantity:  18 tablet   Refills:  0       senna-docusate 8.6-50 MG per tablet   Commonly known as:  SENOKOT-S;PERICOLACE        Dose:  1 tablet   Take 1 tablet by mouth 2 times daily as needed for constipation   Quantity:  100 tablet   Refills:  0         CONTINUE these medicines which have NOT CHANGED       Dose / Directions    MAGNESIUM OXIDE PO        Refills:  0       PRENATAL 1 PO        Refills:  0            Where to get your medicines      These medications were sent to [facility] [address]  [address] [address]     Phone:  [phone]      acetaminophen 325 MG tablet     ibuprofen 600 MG tablet     senna-docusate 8.6-50 MG per tablet         Some of these will need a paper prescription and others can be bought over the counter. Ask your nurse if you have questions.     Bring a paper prescription for each of these medications      oxyCODONE IR 5 MG tablet            Discharge/Disposition:  Yris Johnson was discharged to home in stable condition with the following instructions/medications:  1) Call for temperature > 100.4,  bright red vaginal bleeding >1 pad an hour x 2 hours, foul smelling vaginal discharge, pain not controlled by usual oral pain meds, persistent nausea and vomiting not controlled on medications, drainage or redness from incision site  2) She desired Nuvaring for contraception at 6 week postpartum visit  3) For feeding she decided to breast feed.  4) She was instructed to follow-up with her primary OB in 6 weeks for a routine postpartum visit and in 1 week for blood pressure check.   5) Discharge activity:  No heavy lifting >15 lbs or strenuous activity for 6 weeks, pelvic rest for 6 weeks, no driving or operating machinery while on narcotics.    # Discharge Pain Plan:   - During her hospitalization, Yris experienced pain due to surgery.  The pain plan for discharge was discussed with Yris and the plan was created in a collaborative fashion.    - Opioids prescribed on discharge: oxycodone  - Duration of opioids after discharge: Per [facility] opioid prescribing guidelines, a 3 day prescription of opioids was provided.  - Bowel regimen: darlene So MD  OB/GYN Resident, PGY-2  4/2/2018       Physician Attestation   I, Laure Marsh, personally examined and evaluated this patient.  I discussed the patient with the resident and care team, and agree with the assessment and plan of care as documented in the resident s note of 04/02/18  [date].      I personally reviewed vital signs, medications, labs and exam.    Key findings: Doing well. Has not taken oxycodone post-op. Will reduce post-op prescription to 5 in case she has any breakthrough pain as she is more active, decision made with patient.   Discussed breastfeeding, post-op precautions.   Plans for contraception at 8 weeks.   Will have nurse visit for BP check and then check BP herself in a few days and communicate with provider. Aware of thresholds.     Laure Marsh  Date of Service (when I saw the patient): 04/02/18

## 2018-03-30 NOTE — ANESTHESIA POSTPROCEDURE EVALUATION
Patient: Yris Johnson    Procedure(s):  Primary  Section  - Wound Class: II-Clean Contaminated    Diagnosis:Breech Position   Diagnosis Additional Information: No value filed.    Anesthesia Type:  Spinal    Note:  Anesthesia Post Evaluation    Patient location during evaluation: OB PACU  Patient participation: Able to participate in evaluation but full recovery from regional anesthesia has not yet ocurrred but is anticipated to occur within 48 hours  Level of consciousness: awake and alert  Pain management: adequate  Airway patency: patent  Cardiovascular status: acceptable  Respiratory status: acceptable  Hydration status: acceptable  PONV: none     Anesthetic complications: None          Last vitals:  Vitals:    18 1111 18 1115 18 1130   BP: 127/82 128/56 125/66   Resp: 10 25 11   Temp:      SpO2: 99% 99%          Electronically Signed By: Cindy Olvera MD  2018  11:48 AM

## 2018-03-30 NOTE — IP AVS SNAPSHOT
MRN:[id number]                      After Visit Summary   3/30/2018    Yris Johnson    MRN: [id number]           Thank you!     Thank you for choosing [facility] for your care. Our goal is always to provide you with excellent care. Hearing back from our patients is one way we can continue to improve our services. Please take a few minutes to complete the written survey that you may receive in the mail after you visit with us. Thank you!        Patient Information     Date Of Birth          1985        Designated Caregiver       Most Recent Value    Caregiver    Will someone help with your care after discharge? yes    Name of designated caregiver Jamar    Phone number of caregiver     Caregiver address [address] [address]      About your hospital stay     You were admitted on:  March 30, 2018 [facility]    You were discharged on:  April 2, 2018        Reason for your hospital stay       Maternity care                  Who to Call     For medical emergencies, please call 911.  For non-urgent questions about your medical care, please call your primary care provider or clinic, [phone]  For questions related to your surgery, please call your surgery clinic        Attending Provider     Provider Kenia Che MD OB/Gyn       Primary Care Provider Office Phone # Fax #    Kenia Witt [phone]      After Care Instructions     Activity       Review discharge instructions            Diet       Resume previous diet            Discharge Instructions - Hypertensive disorders patients       High Blood pressure patients to follow up in clinic or via home care within one week for a blood pressure check            Discharge Instructions - Postpartum visit       Schedule postpartum visit with your provider and return to clinic in 6 weeks.                  Follow-up Appointments     Follow Up  and recommended labs and tests       Follow-up postpartum visit in 6 weeks                  Further instructions from your care team       Postop  Birth Instructions    Activity       Do not lift more than 10 pounds for 6 weeks after surgery.  Ask family and friends for help when you need it.    No driving until you have stopped taking your pain medications (usually two weeks after surgery).    No heavy exercise or activity for 6 weeks.  Don't do anything that will put a strain on your surgery site.    Don't strain when using the toilet.  Your care team may prescribe a stool softener if you have problems with your bowel movements.     To care for your incision:       Keep the incision clean and dry.    Do not soak your incision in water. No swimming or hot tubs until it has fully healed. You may soak in the bathtub if the water level is below your incision.    Do not use peroxide, gel, cream, lotion, or ointment on your incision.    Adjust your clothes to avoid pressure on your surgery site (check the elastic in your underwear for example).     You may see a small amount of clear or pink drainage and this is normal.  Check with your health care provider:       If the drainage increases or has an odor.    If the incision reddens, you have swelling, or develop a rash.    If you have increased pain and the medicine we prescribed doesn't help.    If you have a fever above 100.4 F (38 C) with or without chills when placing thermometer under your tongue.   The area around your incision (surgery wound), will feel numb.  This is normal. The numbness should go away in less than a year.     Keep your hands clean:  Always wash your hands before touching your incision (surgery wound). This helps reduce your risk of infection. If your hands aren't dirty, you may use an alcohol hand-rub to clean your hands. Keep your nails clean and short.    Call your healthcare provider if you have any of these symptoms:       You soak  a sanitary pad with blood within 1 hour, or you see blood clots larger than a golf ball.    Bleeding that lasts more than 6 weeks.    Vaginal discharge that smells bad.    Severe pain, cramping or tenderness in your lower belly area.    A need to urinate more frequently (use the toilet more often), more urgently (use the toilet very quickly), or it burns when you urinate.    Nausea and vomiting.    Redness, swelling or pain around a vein in your leg.    Problems breastfeeding or a red or painful area on your breast.    Chest pain and cough or are gasping for air.    Problems with coping with sadness, anxiety or depression. If you have concerns about hurting yourself or the baby, call your provider immediately.      You have questions or concerns after you return home.                  Pending Results     No orders found from 3/28/2018 to 3/31/2018.            Statement of Approval     Ordered          04/02/18 0809  I have reviewed and agree with all the recommendations and orders detailed in this document.  EFFECTIVE NOW     Approved and electronically signed by:  Navi So MD             Admission Information     Date & Time Provider Department Dept. Phone    3/30/2018 Kenia Barbour MD [facility] [phone]      Your Vitals Were     Blood Pressure Pulse Temperature Respirations Weight Last Period    145/91 115 98.2  F (36.8  C) (Axillary) 18 101.2 kg (223 lb) 07/03/2017    Pulse Oximetry BMI (Body Mass Index)                98% 38.28 kg/m2          MyChart Information     [employer] gives you secure access to your electronic health record. If you see a primary care provider, you can also send messages to your care team and make appointments. If you have questions, please call your primary care clinic.  If you do not have a primary care provider, please call [phone] and they will assist you.        Care EveryWhere ID     This is your Care EveryWhere ID. This could be used by other organizations to access  your [facility] medical records  [id number]        Equal Access to Services     JUAN JOSE MARQUEZ : Hadii aad ku hadanjaliab Sojaqueline, waaxda luqadaha, qaybta kaalmada andriy, phani vickdanieldenny alicea. So [facility] [phone].    ATENCIÓN: Si habla español, tiene a schulz disposición servicios gratuitos de asistencia lingüística. Llame al [phone].    We comply with applicable federal civil rights laws and [place] laws. We do not discriminate on the basis of race, color, national origin, age, disability, sex, sexual orientation, or gender identity.               Review of your medicines      START taking        Dose / Directions    acetaminophen 325 MG tablet   Commonly known as:  TYLENOL        Dose:  650 mg   Take 2 tablets (650 mg) by mouth every 4 hours as needed for mild pain   Quantity:  60 tablet   Refills:  0       ibuprofen 600 MG tablet   Commonly known as:  ADVIL/MOTRIN        Dose:  600 mg   Take 1 tablet (600 mg) by mouth every 6 hours as needed for other (cramping)   Quantity:  60 tablet   Refills:  0       oxyCODONE IR 5 MG tablet   Commonly known as:  ROXICODONE        Dose:  5-10 mg   Take 1-2 tablets (5-10 mg) by mouth every 3 hours as needed for other (pain control or improvement in physical function. Hold dose for analgesic side effects.)   Quantity:  18 tablet   Refills:  0       senna-docusate 8.6-50 MG per tablet   Commonly known as:  SENOKOT-S;PERICOLACE        Dose:  1 tablet   Take 1 tablet by mouth 2 times daily as needed for constipation   Quantity:  100 tablet   Refills:  0         CONTINUE these medicines which have NOT CHANGED        Dose / Directions    MAGNESIUM OXIDE PO        Refills:  0       PRENATAL 1 PO        Refills:  0            Where to get your medicines      These medications were sent to [facility] [place], [place] - [address]  [address] [address]     Phone:  [phone]     acetaminophen 325 MG tablet     ibuprofen 600 MG tablet    senna-docusate 8.6-50 MG per tablet         Some of these will need a paper prescription and others can be bought over the counter. Ask your nurse if you have questions.     Bring a paper prescription for each of these medications     oxyCODONE IR 5 MG tablet                Protect others around you: Learn how to safely use, store and throw away your medicines at www.disposemymeds.org.        Information about OPIOIDS     PRESCRIPTION OPIOIDS: WHAT YOU NEED TO KNOW    Prescription opioids can be used to help relieve moderate to severe pain and are often prescribed following a surgery or injury, or for certain health conditions. These medications can be an important part of treatment but also come with serious risks. It is important to work with your health care provider to make sure you are getting the safest, most effective care.    WHAT ARE THE RISKS AND SIDE EFFECTS OF OPIOID USE?  Prescription opioids carry serious risks of addiction and overdose, especially with prolonged use. An opioid overdose, often marked by slowed breathing can cause sudden death. The use of prescription opioids can have a number of side effects as well, even when taken as directed:      Tolerance - meaning you might need to take more of a medication for the same pain relief    Physical dependence - meaning you have symptoms of withdrawal when a medication is stopped    Increased sensitivity to pain    Constipation    Nausea, vomiting, and dry mouth    Sleepiness and dizziness    Confusion    Depression    Low levels of testosterone that can result in lower sex drive, energy, and strength    Itching and sweating    RISKS ARE GREATER WITH:    History of drug misuse, substance use disorder, or overdose    Mental health conditions (such as depression or anxiety)    Sleep apnea    Older age (65 years or older)    Pregnancy    Avoid alcohol while taking prescription opioids.   Also, unless specifically advised by your  health care provider, medications to avoid include:    Benzodiazepines (such as Xanax or Valium)    Muscle relaxants (such as Soma or Flexeril)    Hypnotics (such as Ambien or Lunesta)    Other prescription opioids    KNOW YOUR OPTIONS:  Talk to your health care provider about ways to manage your pain that do not involve prescription opioids. Some of these options may actually work better and have fewer risks and side effects:    Pain relievers such as acetaminophen, ibuprofen, and naproxen    Some medications that are also used for depression or seizures    Physical therapy and exercise    Cognitive behavioral therapy, a psychological, goal-directed approach, in which patients learn how to modify physical, behavioral, and emotional triggers of pain and stress    IF YOU ARE PRESCRIBED OPIOIDS FOR PAIN:    Never take opioids in greater amounts or more often than prescribed    Follow up with your primary health care provider and work together to create a plan on how to manage your pain.    Talk about ways to help manage your pain that do not involve prescription opioids    Talk about all concerns and side effects    Help prevent misuse and abuse    Never sell or share prescription opioids    Never use another person's prescription opioids    Store prescription opioids in a secure place and out of reach of others (this may include visitors, children, friends, and family)    Visit www.cdc.gov/drugoverdose to learn about risks of opioid abuse and overdose    If you believe you may be struggling with addiction, tell your health care provider and ask for guidance or call [facility]'s National Helpline at 6-784-890-HELP    LEARN MORE / www.cdc.gov/drugoverdose/prescribing/guideline.html    Safely dispose of unused prescription opioids: Find your local drug take-back programs and more information about the importance of safe disposal at www.doseofreality.[place].gov             Medication List: This is a list of all your medications  and when to take them. Check marks below indicate your daily home schedule. Keep this list as a reference.      Medications           Morning Afternoon Evening Bedtime As Needed    acetaminophen 325 MG tablet   Commonly known as:  TYLENOL   Take 2 tablets (650 mg) by mouth every 4 hours as needed for mild pain   Last time this was given:  975 mg on 4/2/2018  9:00 AM                                ibuprofen 600 MG tablet   Commonly known as:  ADVIL/MOTRIN   Take 1 tablet (600 mg) by mouth every 6 hours as needed for other (cramping)   Last time this was given:  800 mg on 4/2/2018  5:05 AM                                MAGNESIUM OXIDE PO                                oxyCODONE IR 5 MG tablet   Commonly known as:  ROXICODONE   Take 1-2 tablets (5-10 mg) by mouth every 3 hours as needed for other (pain control or improvement in physical function. Hold dose for analgesic side effects.)                                PRENATAL 1 PO                                senna-docusate 8.6-50 MG per tablet   Commonly known as:  SENOKOT-S;PERICOLACE   Take 1 tablet by mouth 2 times daily as needed for constipation   Last time this was given:  2 tablets on 4/2/2018  9:01 AM

## 2018-03-30 NOTE — PROGRESS NOTES
Patient arrived to [facility] unit via zoom cart at 1320,with belongings, accompanied by spouse/ significant other, with infant in arms. Received report from Mimi RANDLE RN and checked bands. Unit and room orientation completd. Call light given; no concerns present at this time. Continue with plan of care.

## 2018-03-30 NOTE — ANESTHESIA CARE TRANSFER NOTE
Patient: Yris Johnson    Procedure(s):  Primary  Section  - Wound Class: II-Clean Contaminated    Diagnosis: Breech Position   Diagnosis Additional Information: No value filed.    Anesthesia Type:   Spinal     Note:  Airway :Room Air  Patient transferred to:PACU  Comments: VSS. Breathing spontaneously at a regular rate and maintaining O2 sats on RA. Denies nausea or pain. No apparent complications from anesthesia.     Hany Almanzar DO, MSc.  Anesthesia Resident, CA-1  Handoff Report: Identifed the Patient, Identified the Reponsible Provider, Reviewed the pertinent medical history, Discussed the surgical course, Reviewed Intra-OP anesthesia mangement and issues during anesthesia, Set expectations for post-procedure period and Allowed opportunity for questions and acknowledgement of understanding      Vitals: (Last set prior to Anesthesia Care Transfer)    CRNA VITALS  3/30/2018 1032 - 3/30/2018 1132      3/30/2018             Pulse: 76    SpO2: 99 %                Electronically Signed By: Hany Almanzar DO  2018  11:43 AM

## 2018-03-30 NOTE — LACTATION NOTE
Met with Yris as the resource RN due to concerns about smooth nipples and sleepy baby. Baby Sivan was born this morning at 1029 at 38.4 weeks. She has been sleepy this afternoon and mom has been attempting to breastfeed, keeping baby skin to skin an hand expressing colostrum. Bedside RN reports baby latches on but doesn't suck.  Baby sleeping during visit.  Reviewed early feeding cues, on demand feeding, benefits of hand expression and skin to skin, normal  behavior birth-24 hours, the Second Night, and expected  output.  Plan: continue to breastfeed on demand. If baby sleepy, continue with skin to skin and hand expression.

## 2018-03-30 NOTE — ANESTHESIA PROCEDURE NOTES
Peripheral Nerve Block Procedure Note    Staff:     Anesthesiologist:  ESTHER MIGUEL    Resident/CRNA:  RAYMUNDO KELLY    Block performed by resident/CRNA in the presence of a teaching physician    Location: PACU  Procedure Start/Stop TImes:      3/30/2018 11:30 AM     3/30/2018 11:35 AM    patient identified, IV checked, site marked, risks and benefits discussed, informed consent, monitors and equipment checked, pre-op evaluation, at physician/surgeon's request and post-op pain management      Correct Patient: Yes      Correct Position: Yes      Correct Site: Yes      Correct Procedure: Yes      Correct Laterality:  N/A    Site Marked:  N/A  Procedure details:     Procedure:  TAP    ASA:  2    Laterality:  Bilateral    Position:  Supine    Sterile Prep: chloraprep, mask and sterile gloves      Local skin infiltration:  None    Insertion Site:  T8-9    Needle:  Short bevel    Needle gauge:  21    Needle length (inches):  3.1    Ultrasound: Yes      Ultrasound used to identify targeted nerve, plexus, or vascular structure and placed a needle adjacent to it      Permanent Image entered into patiient's record      Abnormal pain on injection: No      Blood Aspirated: No      Paresthesias:  No    Bleeding at site: No      Bolus via:  Needle    Infusion Method:  Single Shot    Complications:  None

## 2018-03-30 NOTE — PLAN OF CARE
Problem: Patient Care Overview  Goal: Plan of Care/Patient Progress Review  Outcome: No Change  Pt A&O, VSS. Incision CDI, fundus firm and midline.  Pt voiding well.  Pain well managed with ibuprofen. Pt independent in room. Pt independent with breastfeeding, latch was checked.  Baby is sleepy, educated mom on phases of infant nursing and cues. Will continue to monitor.  Data: Vital signs within normal limits. Postpartum checks within normal limits - see flow record. Patient eating and drinking normally, no c/o nausea. Patient has gray present, still has diminished sensation from spinal block. No apparent signs of infection. UTV incision, dressing CDI. Patient has spouse present and both are able to care for infant.  Action: Patient not medicated during the shift for pain, none present since delivery. Patient education done. See flow record.  Response: Positive attachment behaviors observed with infant. Support person present.   Plan: Continue POC.

## 2018-03-30 NOTE — PLAN OF CARE
Problem: Patient Care Overview  Goal: Plan of Care/Patient Progress Review  Outcome: Improving  [facility]  Reason for maternal/fetal assessment per patient is Scheduled  Section  Patient is a . Prenatal record reviewed.      Obstetric History       T0      L0     SAB0   TAB0   Ectopic0   Multiple0   Live Births0       # Outcome Date GA Lbr Norberto/2nd Weight Sex Delivery Anes PTL Lv   1 Current               . Medical history:   Past Medical History:   Diagnosis Date     ASCUS favor benign 3/2015    neg HPV, plan cotest in 1 yr.     GLENNA I (cervical intraepithelial neoplasia I) 2008    outside clinic, poss GLENAN II     GLENNA II (cervical intraepithelial neoplasia II) 2008    colp GLENNA 2, [place], [place]     History of eating disorder  to    . Gestational Age 38w4d. VSS. Fetal movement present. Patient denies cramping, backache, vaginal discharge, pelvic pressure, UTI symptoms, GI problems, bloody show, vaginal bleeding, edema, headache, visual disturbances, epigastric or URQ pain, abdominal pain, rupture of membranes. Support persons Chris  present.  Action: Verbal consent for EFM. EFM applied for monitoring. Uterine assessment 6-8 minutes aoart. Fetal assessment: Presumed adequate fetal oxygenation documented (see flow record).   Response:  informed of pt arrival.

## 2018-03-30 NOTE — BRIEF OP NOTE
[facility]   Brief Operative Note     Surgery Date: 3/30/2018    Surgeon:  Kenia Barbour MD    Assistants:  Marisela Monsalve MD, PGY-3        Pre-op Diagnosis:    1.  at 38w4d  2. Breech presentation  3. Chronic hypertension  4. Generalized anxiety disorder    Post-op Diagnosis:    1.  s/p delivery as noted below  2. Breech presentation  3. Chronic hypertension  4. Generalized anxiety disorder    Procedure:  Primary low-transverse  section with double layer uterine closure via pfannenstiel skin incision    Anesthesia: Spinal    QBL: 1258 cc    IVF:  1400 cc crystalloid    UOP:  200 cc clear urine at the end of the case    Drains: Espinal Catheter     Specimens:  Cord blood    Complications:  None apparent    Findings:   1. No fascial or intraabdominal adhesions  2. Clear amniotic fluid  3. Liveborn female infant in torrie breech presentation at 1028 on 3/30/18. Apgars 9 at 1 minute & 9 at 5 minutes. Weight 3710 g.  4. Normal uterus, fallopian tubes, and ovaries.     Disposition:  Transferred in stable condition to [facility]    Mariseal Monsalve MD  OBGYN PGY-3  [phone]  11:13 AM 3/30/2018

## 2018-03-30 NOTE — ANESTHESIA PREPROCEDURE EVALUATION
Anesthesia Evaluation     .             ROS/MED HX    ENT/Pulmonary:  - neg pulmonary ROS     Neurologic:  - neg neurologic ROS     Cardiovascular:  - neg cardiovascular ROS       METS/Exercise Tolerance:     Hematologic:  - neg hematologic  ROS       Musculoskeletal:  - neg musculoskeletal ROS       GI/Hepatic:  - neg GI/hepatic ROS       Renal/Genitourinary:  - ROS Renal section negative       Endo:  - neg endo ROS       Psychiatric:     (+) psychiatric history anxiety      Infectious Disease:  - neg infectious disease ROS       Malignancy:      - no malignancy   Other:    (+) Possibly pregnant                    Physical Exam  Normal systems: dental    Airway   Mallampati: III  TM distance: >3 FB  Neck ROM: full    Dental     Cardiovascular   Rhythm and rate: regular and normal      Pulmonary    breath sounds clear to auscultation                    Anesthesia Plan      History & Physical Review      ASA Status:  2 .        Plan for Spinal     Additional equipment: Videolaryngoscope      Postoperative Care  Postoperative pain management:  Multi-modal analgesia.      Consents        ANESTHESIA OB PREOP EVALUATION    Procedure: Procedure(s):  Primary  Section  - Wound Class:     HPI: Yris Johnson is a 32 year old female presenting for above procedure given a medical history significant for breech presentation.    PMHx/PSHx/ROS:  Past Medical History:   Diagnosis Date     ASCUS favor benign 3/2015    neg HPV, plan cotest in 1 yr.     GLENNA I (cervical intraepithelial neoplasia I) 2008    outside clinic, poss GLENNA II     GLENNA II (cervical intraepithelial neoplasia II) 2008    colp GLENNA 2, [place], [place]     History of eating disorder  to        Past Surgical History:   Procedure Laterality Date     HC TOOTH EXTRACTION W/FORCEP           Past Anes Hx: No personal or family h/o anesthesia problems    Soc Hx:   Social History   Substance Use Topics     Smoking status: Never Smoker     Smokeless  tobacco: Never Used     Alcohol use Yes      Comment: Once a week       Allergies: No Known Allergies    Meds:   No prescriptions prior to admission.       Current Outpatient Prescriptions   Medication Sig Dispense Refill     MAGNESIUM OXIDE PO        Prenatal MV-Min-Fe Fum-FA-DHA (PRENATAL 1 PO)          Physical Exam:  Vitals: LMP 07/03/2017  BMI= There is no height or weight on file to calculate BMI.    Labs:  UPT: No results found for: HCGQUANT      BMP:  No results for input(s): NA, POTASSIUM, CHLORIDE, CO2, BUN, CR, GLC, GA in the last 59292 hours.  CBC:   Recent Labs   Lab Test  03/27/18   1603   WBC  10.4   RBC  4.50   HGB  13.1   HCT  38.9   MCV  86   MCH  29.1   MCHC  33.7   RDW  13.6   PLT  224     Coags:  No results for input(s): INR, PTT, FIBR in the last 15787 hours.    Assessment/Plan:  - 32 year old female, ASA 2.  - Awake with neuraxial blockade, standard ASA monitors.  - As back-up, GETA with C-mac, IV induction, balanced anesthetic  - Analgesia:   - Pre-operatively: per primary   - Intra-operatively: IV analgesics as needed if neuraxial blockade ineffective   - Post-operatively: TAP blocks, PO/IV analgesics per OB as primary.  - Lines:    - PIVx1  - Antibiotics per OB  - PONV prophylaxis if indicated  - Blood products available, possible administration discussed with patient  - Relevant risks, benefits, alternatives and the anesthetic plan were discussed with patient/family or family representative.  All questions were answered and there was agreement to proceed.      Hany Almanzar DO, MSc. (CA-1)    3/29/2018.  8:11 PM

## 2018-03-30 NOTE — PLAN OF CARE
Problem: Surgery Nonspecified (Adult)  Goal: Signs and Symptoms of Listed Potential Problems Will be Absent, Minimized or Managed (Surgery Nonspecified)  Signs and symptoms of listed potential problems will be absent, minimized or managed by discharge/transition of care (reference Surgery Nonspecified (Adult) CPG).   Outcome: Improving  Data: Yris Johnson transferred to 7134 via cart at 1315. Baby transferred via parent's arms. Espinal with clear urine. Running second bag of pitocin. TAP Black done.   Action: Receiving unit notified of transfer: Yes. Patient and family notified of room change. Report given to [facility] JUSTICE. Belongings sent to receiving unit. Accompanied by Registered Nurse. Oriented patient to surroundings. Call light within reach. ID bands double-checked with receiving RN.  Response: Patient tolerated transfer and is stable.  and  at bedside.

## 2018-03-30 NOTE — ANESTHESIA PROCEDURE NOTES
Spinal/LP Procedure Note    Spinal Block  Staff:     Anesthesiologist:  OLGA LIDIA CORREA    Resident/CRNA:  RAYMUNDO KELLY    Spinal/LP performed by resident/CRNA in presence of a teaching physician.    Location: OB and OR  Procedure Start/Stop Times:     patient identified, IV checked, site marked, risks and benefits discussed, informed consent, monitors and equipment checked, pre-op evaluation, at physician/surgeon's request and post-op pain management      Correct Patient: Yes      Correct Position: Yes      Correct Site: Yes      Correct Procedure: Yes      Correct Laterality:  Yes    Site Marked:  Yes  Procedure:     Procedure:  Intrathecal    ASA:  2    Position:  Sitting    Sterile Prep: chloraprep      Insertion site:  L3-4    Approach:  Midline    Needle Type:  Ruslan    Needle gauge (G):  25    Local Skin Infiltration:  1% lidocaine    Needle Length (in):  3.5    Introducer used: Yes      Introducer gauge:  20 G    Attempts:  2    Redirects:  1    CSF:  Clear    Paresthesias:  No    Time injected:  10:06

## 2018-03-30 NOTE — H&P
[facility]  OB History and Physical      Yris Johnson MRN# [id number]   Age: 32 year old YOB: 1985     CC:  Scheduled  section    HPI:  Ms. Yris Johnson is a 32 year old  at 38w4d by LMP c/w 9w1d US, who presents for scheduled  section due to breech presentation. She is doing well today.  She denies contractions, vaginal bleeding, and loss of fluid. Reports normal fetal movement. She denies fevers, chills, headaches, vision changes, chest pain, shortness of breath, abdominal pain, nausea, vomiting, diarrhea, constipation, dysuria, lower extremity swelling.     Pregnancy Complications:  1. Breech presentation  2. R choroid plexus cyst  3. Anxiety  4. History of eating disorder  5. Gestational versus chronic hypertension      Prenatal Labs:   Lab Results   Component Value Date    ABO B 2018    RH Pos 2018    AS Neg 2018    HEPBANG Nonreactive 2017    CHPCRT  2011     Negative for C. trachomatis rRNA by transcription mediated amplification.   A negative result by transcription mediated amplification does not preclude the   presence of C. trachomatis infection because results are dependent on proper   and adequate collection, absence of inhibitors, and sufficient rRNA to be   detected.    GCPCRT  2011     Negative for N. gonorrhoeae rRNA by transcription mediated amplification.   A negative result by transcription mediated amplification does not preclude the   presence of N. gonorrhoeae infection because results are dependent on proper   and adequate collection, absence of inhibitors, and sufficient rRNA to be   detected.    TREPAB Negative 2018    HGB 13.1 2018       GBS Status:   Lab Results   Component Value Date    GBS Negative 2018         OB History  Obstetric History       T0      L0     SAB0   TAB0   Ectopic0   Multiple0   Live Births0       # Outcome Date GA Lbr Norberto/2nd  Weight Sex Delivery Anes PTL Lv   1 Current                   PMHx:   Past Medical History:   Diagnosis Date     ASCUS favor benign 3/2015    neg HPV, plan cotest in 1 yr.     GLENNA I (cervical intraepithelial neoplasia I) 2008    outside clinic, poss GLENNA II     GLENNA II (cervical intraepithelial neoplasia II) 2008    colp GLENNA 2, [place], [place]     History of eating disorder  to      PSHx:   Past Surgical History:   Procedure Laterality Date     HC TOOTH EXTRACTION W/FORCEP       Meds:   Prescriptions Prior to Admission   Medication Sig Dispense Refill Last Dose     MAGNESIUM OXIDE PO    Past Week at Unknown time     Prenatal MV-Min-Fe Fum-FA-DHA (PRENATAL 1 PO)    3/29/2018 at Unknown time     Allergies:  No Known Allergies   FmHx:   Family History   Problem Relation Age of Onset     Asthma Father      DIABETES Paternal Grandfather      CEREBROVASCULAR DISEASE Paternal Grandfather      Asthma Sister      CANCER Paternal Aunt      CANCER Paternal Aunt      SocHx: She denies any tobacco, alcohol, or other drug use during this pregnancy.    ROS:   Complete 10-point ROS negative except as noted in HPI. She denies headache, blurry vision, chest pain, shortness of breath, RUQ pain, nausea, vomiting, dysuria, hematuria or extremity edema.    PE:   Gen: Well-appearing, NAD, comfortable   CV: Regular rate, well perfused  Pulm: Normal respiratory effort  Abd: Soft, gravid, non-tender, EFW 7.5# by Leopolds  Ext: Trace LE edema bilaterally  Cx: Deferred    Pres:  Breech by BSUS  Memb: intact    FHT: Baseline 130, moderate variability, present accelerations, absenyt decelerations   Hideout: 1-2 contractions in 10 minutes      Assessment  Ms. Yris Johnson is a 32 year old , at 38w4d by LMP c/w 9w1d US, who presents for scheduled primary  section.    Plan  Admit to L&D for scheduled primary  section. The risks, benefits, and alternatives of  section were discussed, including the risks of  bleeding, infection, injury to surrounding organs, fetal injury, and remote risks of hysterectomy. She consented to a blood transfusion in the event of a life threatening amount of bleeding. She had time to ask questions and agreed to proceed. Surgical consent was signed.  Pain:  Spinal per anesthesia.   ID:  2g Ancef for yumiko-op antibiotics.  FWB: Category I FHT.  Continue EFM.   PNC: Rh positive, Rubella immune, GBS negative, , GTT 74, 189, 146, 99, Placenta posterior  Fen/GI: NPO, IVFs.  PPH ppx: CBC, T&S.   Chronic versus gestational hypertension: Blood pressures noted to be mild range at first OB visit, in 2015, UPC negative, HELLP labs WNL. Plan close BP monitoring postpartum.     The patient was discussed with Dr. Kenia Barbour who is in agreement with the treatment plan.    # Pain Assessment:   Yris marie pain level was assessed and she currently denies pain.        Marisela Monsalve MD  OBGYN PGY-3  [phone]  5:52 AM 3/30/2018

## 2018-03-31 LAB — HGB BLD-MCNC: 10.3 G/DL (ref 11.7–15.7)

## 2018-03-31 PROCEDURE — 25000132 ZZH RX MED GY IP 250 OP 250 PS 637: Performed by: OBSTETRICS & GYNECOLOGY

## 2018-03-31 PROCEDURE — 36415 COLL VENOUS BLD VENIPUNCTURE: CPT | Performed by: OBSTETRICS & GYNECOLOGY

## 2018-03-31 PROCEDURE — 25000128 H RX IP 250 OP 636: Performed by: OBSTETRICS & GYNECOLOGY

## 2018-03-31 PROCEDURE — 12000028 ZZH R&B OB UMMC

## 2018-03-31 PROCEDURE — 85018 HEMOGLOBIN: CPT | Performed by: OBSTETRICS & GYNECOLOGY

## 2018-03-31 RX ADMIN — SENNOSIDES AND DOCUSATE SODIUM 1 TABLET: 8.6; 5 TABLET ORAL at 08:13

## 2018-03-31 RX ADMIN — KETOROLAC TROMETHAMINE 30 MG: 30 INJECTION, SOLUTION INTRAMUSCULAR at 00:31

## 2018-03-31 RX ADMIN — ACETAMINOPHEN 975 MG: 325 TABLET ORAL at 00:31

## 2018-03-31 RX ADMIN — KETOROLAC TROMETHAMINE 30 MG: 30 INJECTION, SOLUTION INTRAMUSCULAR at 06:28

## 2018-03-31 RX ADMIN — KETOROLAC TROMETHAMINE 30 MG: 30 INJECTION, SOLUTION INTRAMUSCULAR at 13:54

## 2018-03-31 RX ADMIN — SENNOSIDES AND DOCUSATE SODIUM 2 TABLET: 8.6; 5 TABLET ORAL at 19:30

## 2018-03-31 RX ADMIN — ACETAMINOPHEN 975 MG: 325 TABLET ORAL at 08:13

## 2018-03-31 RX ADMIN — IBUPROFEN 800 MG: 400 TABLET ORAL at 19:30

## 2018-03-31 RX ADMIN — ACETAMINOPHEN 975 MG: 325 TABLET ORAL at 17:01

## 2018-03-31 NOTE — PLAN OF CARE
Problem: Patient Care Overview  Goal: Plan of Care/Patient Progress Review  Outcome: Improving  VSS. AFebrile. Up ad ad. Showered. Drsg off now and incision ELVIA.  IVSL and patent. Breast feeding with total assist and nipple shield. FOB here and very supportive. Passing gas but no BM yet. LBM 3/30 before delivery. Abdominal binder given for comfort. C/o minimal pain and pain meds given with relief.  Will continue to support breast feeding and monitor pt.

## 2018-03-31 NOTE — PROGRESS NOTES
Post Partum Progress Note  POD#1    Subjective:  She is resting comfortably in bed this morning. Pain is improving and well controlled on current medication regimen. Tolerating PO intake.  Lochia present and minimal  Voiding without difficulty.  Passing flatus. Ambulating without dizziness or difficulty.  She denies headache, changes in vision, nausea/vomiting, chest pain, shortness of breath, RUQ pain, or worsening edema.  Plans to breastfeed, would like to see lactation this morning.    Objective:  Vitals:    18 1720 18 1830 18 2000 18 0000   BP: 131/90 130/88 141/82 107/75   Resp: 18 16 18 16   Temp: 98.1  F (36.7  C) 98  F (36.7  C) 98.3  F (36.8  C) 98.1  F (36.7  C)   TempSrc: Oral Oral Oral Oral   SpO2: 99% 99% 97% 98%   Weight:         General: NAD  CV: well perfused  Pulm:  Normal respiratory effort.  Abd: Soft, non-tender, non-distended. Fundus is firm and below the umbilicus.  Incision c/d/i.       # Pain Assessment:  Current Pain Score 3/31/2018   Patient currently in pain? denies   Pain location -   - Yris is experiencing pain due to surgery. Pain management was discussed and the plan was created in a collaborative fashion.  Yris's response to the current recommendations: engaged  - Please see the plan for pain management as documented below    Assessment/Plan:  Yris Johnson is a 32 year old  female who is POD#1 s//p PLTCS for breech presentation and chronic hypertension.    - CHTN: BPs overall normotensive, few mild range. HELLP wnl  - Encourage routine post-operative goals including ambulation and incentive spirometry  - PNC: Rh pos. Rubella immune. No intervention indicated.  - Pain: controlled on oral medications  - Heme: Hgb 13.1> [id number]> 10.3. Asymptomatic  - GI: continue anti-emetics and stool softeners as needed.  - : Voiding spontaneously  - Infant: Stable in room  - Feeding: Plans on breastfeeding.  - BC: Plans on     Discharge to home on  POD#2-3    Steffi Cleaning MD  OBGYN PGY-3    I, Brooklynn Lindsay MD, personally saw and evaluated this patient.  I discussed the patient with the resident and care team, and agree with the assessment and plan of care as documented in the resident's note of 03/31/18.  I personally reviewed vital signs, medications, lab, and exam.     Key Findings:  Vital signs stable, ambulating, tolerating diet.  Abdomen non-tender, fundus firm.     PLAN: Routine post op care.     Brooklynn Lindsay MD   Date of Service (when I saw the patient) 03/31/18

## 2018-03-31 NOTE — LACTATION NOTE
This note was copied from a baby's chart.  Consult for sleepy baby and first time breastfeeding.   Yris reports that baby Gavin has been sleepy overnight. Yris has been hand expressing colostrum and putting Gavin skin to skin. She pumped once and Gavin did have a few feedings where she was alert and sucking for a few minutes. Yris had a csection on 3/30/18 at 1029am and Gavin is 38.4 weeks. Yris has a history of anxiety and an eating disorder (she is in recovery). Her breasts are soft, rounded and symmetrical with bilateral intact, smooth nipples that lashaun with stimulation. She noted significant size increase of both breasts during pregnancy. Gavin has a normal oral exam with good tongue extension. She has had 4 voids and 3 stools since birth. We attempted to wake and latch Gavin, but she was disinterested and quickly fell asleep. She was kept skin to skin.  Reviewed early feeding cues, demand feeding, benefits of breast massage and hand expression to support milk supply, nutritive vs non-nutritive sucking, expected  output, the Second Night, and  weight loss.  Plan: Continue to feed on demand, but attempt at least every 3 hours if Gavin is sleepy. If Gavin disinterested at the breast continue hand expression/pumping and spoon feeding colostrum and skin to skin. Weigh infant at 24 hours and continue to assist with feedings.

## 2018-03-31 NOTE — PLAN OF CARE
Problem: Patient Care Overview  Goal: Plan of Care/Patient Progress Review  Outcome: Improving  No complaints of pain overnight. Fundus firm and midline, U1. Needs assistance attempting to latch baby onto breasts. Uses nipple shield due to flatter nipples. Patient encouraged to frequently massage breasts and to hand express colostrum when baby doesn't latch. Patient able to demonstrate how to hand express onto spoon and feed baby. Set up breast pump to help patient with more options to help bring in colostrum. Ambulating independently. Vss

## 2018-03-31 NOTE — PLAN OF CARE
Problem: Patient Care Overview  Goal: Plan of Care/Patient Progress Review  Doing well. Mildly elevated BP's. Excellent diuresis after pitocin completed, IV saline locked at 1920. Pain well controlled s/p TAP block, toradol q6h and Tylenol q8h. Breastfeeding with assistance, infant sleepy. Many visitors present this evening.

## 2018-04-01 PROCEDURE — 12000028 ZZH R&B OB UMMC

## 2018-04-01 PROCEDURE — 25000132 ZZH RX MED GY IP 250 OP 250 PS 637: Performed by: OBSTETRICS & GYNECOLOGY

## 2018-04-01 RX ADMIN — ACETAMINOPHEN 975 MG: 325 TABLET ORAL at 00:17

## 2018-04-01 RX ADMIN — ACETAMINOPHEN 975 MG: 325 TABLET ORAL at 17:51

## 2018-04-01 RX ADMIN — IBUPROFEN 800 MG: 400 TABLET ORAL at 09:33

## 2018-04-01 RX ADMIN — ACETAMINOPHEN 975 MG: 325 TABLET ORAL at 09:34

## 2018-04-01 RX ADMIN — IBUPROFEN 800 MG: 400 TABLET ORAL at 16:15

## 2018-04-01 RX ADMIN — SENNOSIDES AND DOCUSATE SODIUM 1 TABLET: 8.6; 5 TABLET ORAL at 23:03

## 2018-04-01 NOTE — PLAN OF CARE
Problem: Patient Care Overview  Goal: Plan of Care/Patient Progress Review  Outcome: Improving  No complaints of pain reported. Fundus firm and midline, U1. Needs frequent assistance with breastfeeding positioning and getting baby to latch onto breasts with nipple shield. Is able to independently hand express and feed baby via spoon. Incision site C/D/I. Vss

## 2018-04-01 NOTE — PLAN OF CARE
Problem: Patient Care Overview  Goal: Plan of Care/Patient Progress Review  Outcome: Therapy, progress toward functional goals as expected  Stable postpartum, incision pain managed with medication. Independent with self and baby cares. Breastfeeding  using a nipple shield. Lactation assistance given..Working at positioning and latch. Hand expressing after feeds, collecting drops and feeding to baby via spoon. Mother states when baby is feeding she see's colostrum in the nipple shield.

## 2018-04-01 NOTE — PROGRESS NOTES
[facility] [place]    Obstetrics Post-Op / Progress Note    Assessment & Plan   Assessment:  -2 Days Post-Op  Procedure(s):  Primary  Section  - Wound Class: II-Clean Contaminated    Doing well.  No immediate surgical complications identified.  No excessive bleeding  Pain well-controlled.    Plan:  Ambulation encouraged  Breast feeding strategies discussed  Pain control measures as needed  Anticipate discharge tomorrow (or this evening, if desired by the patient)    Kenia Barbour     Interval History   Doing well.  Pain is well-controlled.  No fevers.  No history of wound drainage, warmth or significant erythema.  Good appetite.  Denies chest pain, shortness of breath, nausea or vomiting.  Ambulatory.  Breastfeeding going relatively well; working with lactation on latch.    Medications     oxytocin in 0.9% NaCl 100 mL/hr (18 1254)     dextrose 5% lactated ringers Stopped (18)     oxytocin in 0.9% NaCl       NO Rho (D) immune globulin (RhoGam) needed - mother Rh POSITIVE       - MEDICATION INSTRUCTIONS -       - MEDICATION INSTRUCTIONS -         acetaminophen  975 mg Oral Q8H       Physical Exam   Temp: 98.1  F (36.7  C) Temp src: Oral BP: 114/73 Pulse: 100   Resp: 16        Vitals:    18 0850   Weight: 101.2 kg (223 lb)     Vital Signs with Ranges  Temp:  [98.1  F (36.7  C)-98.2  F (36.8  C)] 98.1  F (36.7  C)  Pulse:  [100-103] 100  Resp:  [16-20] 16  BP: (114-147)/(73-89) 114/73       Exam deferred this morning, as patient breastfeeding.    Data   Recent Labs   Lab Test  18   1602   ABO  B   RH  Pos   AS  Neg     Recent Labs   Lab Test  18   0759  18   1603   HGB  10.3*  13.1     Recent Labs   Lab Test  17   0848   RUQIGG  25

## 2018-04-01 NOTE — PLAN OF CARE
Problem: Patient Care Overview  Goal: Plan of Care/Patient Progress Review  Outcome: Improving  [id number]  Data: Vital signs within normal limits. Postpartum checks within normal limits - see flow record. Patient eating and drinking normally. Patient able to empty bladder independently and is up ambulating. No apparent signs of infection. Incision ELVIA, and intact,  Patient performing self cares and is able to care for infant.  Action: Patient medicated during the shift for ibuprofen. See MAR. Patient reassessed within 1 hour after each medication and pain was improved - patient stated she was comfortable. Patient education done about infant cares.  See flow record.  Response: Positive attachment behaviors observed with infant. Support persons  present.   Plan: Anticipate discharge when medically ready.

## 2018-04-02 VITALS
DIASTOLIC BLOOD PRESSURE: 91 MMHG | BODY MASS INDEX: 38.28 KG/M2 | HEART RATE: 115 BPM | WEIGHT: 223 LBS | TEMPERATURE: 98.2 F | OXYGEN SATURATION: 98 % | RESPIRATION RATE: 18 BRPM | SYSTOLIC BLOOD PRESSURE: 145 MMHG

## 2018-04-02 PROCEDURE — 25000132 ZZH RX MED GY IP 250 OP 250 PS 637: Performed by: OBSTETRICS & GYNECOLOGY

## 2018-04-02 RX ORDER — OXYCODONE HYDROCHLORIDE 5 MG/1
5-10 TABLET ORAL
Qty: 5 TABLET | Refills: 0 | Status: SHIPPED | OUTPATIENT
Start: 2018-04-02 | End: 2019-04-03

## 2018-04-02 RX ADMIN — SENNOSIDES AND DOCUSATE SODIUM 2 TABLET: 8.6; 5 TABLET ORAL at 09:01

## 2018-04-02 RX ADMIN — IBUPROFEN 800 MG: 400 TABLET ORAL at 05:05

## 2018-04-02 RX ADMIN — ACETAMINOPHEN 975 MG: 325 TABLET ORAL at 09:00

## 2018-04-02 RX ADMIN — IBUPROFEN 800 MG: 400 TABLET ORAL at 12:15

## 2018-04-02 RX ADMIN — ACETAMINOPHEN 975 MG: 325 TABLET ORAL at 01:35

## 2018-04-02 NOTE — PLAN OF CARE
Problem: Postpartum ( Delivery) (Adult,Obstetrics,Pediatric)  Goal: Signs and Symptoms of Listed Potential Problems Will be Absent, Minimized or Managed (Postpartum)  Signs and symptoms of listed potential problems will be absent, minimized or managed by discharge/transition of care (reference Postpartum ( Delivery) (Adult,Obstetrics,Pediatric) CPG).   Outcome: Completed Date Met: 18  Postpartum checks WNL today and VSS. Patient seen by lactation and plan made. Patient agrees with plan and has no further questions. Reviewed discharge instructions and medications with patient and - offer no further questions. Patient will follow up with OB or primary care at 6 weeks postpartum. Patient discharged with  and baby at 1455.

## 2018-04-02 NOTE — LACTATION NOTE
This note was copied from a baby's chart.  Consult for infant feeding with nipple shield, now at 11.1% weight loss. Mom has been hand expressing after feedings since last evening, getting 3-4 mL each time. History of anxiety and eating disorder (in recovery), C/S for breech @ 38w4d, AGA infant. Breast exam of mom: soft, symmetrical, everted nipples with scab on left, intact on right.     Met with parents at time of feeding, infant crying in dad's arms and mom hand expressing to spoon to settle her first, spoon fed the 1.5 mL while infant actively crying. Education provided about using finger to calm infant first, give supplement when not actively crying. Suggest skin to skin and finger in mouth temporarily to calm while in position to breastfeed, then try latching. Parents demonstrate correct application of shield and good latch onto it. Gavin maintain rhythmic suck and intermittent swallows throughout 30 minute feeding on right side, encourage to limit feedings to 15-30 minutes and have mom pump after while dad gives supplement. Offer and parents accept donor milk (discussed benefits, gave info.) so gave 15 mL supplement X 2 prior to discharge, had mom save her colostrum pumped for supplements after she's home. Review cleaning and setup of pump, how to do hands on pumping and briefly hand expressing after. Yris getting increasing amounts with 2 pumpings this morning (8 mL first time, second time did not stay for amount but easily getting milk down into bottle after first 2 minutes pumping). Reviewed ways to tell if getting enough, feeding log, breastfeeding resource list and who/when to call for concerns or support.      Discussed recommendation to feed on cue but no longer than 3 hours between, to supplement with each feeding, give all that she gets pumping/hand expressing and to add formula to get more prn.  If getting 5-10 mL each time pumping today, ok to give breastmilk and no formula; would like to see  10-15 mL/time tomorrow for most feedings. If only getting small amounts (less than 10-15 mL tomorrow) then add formula to get minimum 15 mL supplements, increasing amounts as Gavin cues for more. If milk not in by day 5, mom will call infant's clinic. Plan for her  (also lactation counsellor) to visit at home and then home care RN Wednesday, clinic visit Thursday or Friday. Mom's chiropractor she sees regularly is IBCLC and plans follow up at [facility], will call there to see who they use and make LC appt. with one of these 2 within a week (follow up on supply, infant weights and help wean from shield). Offered support and encouragement, reviewed plan with both parents and put on d/c instructions, answered questions.

## 2018-04-02 NOTE — PLAN OF CARE
Problem: Patient Care Overview  Goal: Plan of Care/Patient Progress Review  Outcome: Improving  PP assessment WNL. Vital signs stable. Pt up ad ad, steady gait. Intake and output appropriate. Pain managed with Ibuprofen to stay on top of pain-see MAR. Breastfeeding infant with minimal assistance-utilizing nipple shield with feeds. Pt hand massaging/expressing.  in room, positive support. Will continue with pt plan of care.

## 2018-04-02 NOTE — PROGRESS NOTES
OB/GYN Post Partum Progress Note    Subjective:  Yris is doing well this morning. Pain adequately controlled on current medication regimen. Tolerating PO intake. Lochia present and minimal. Voiding without difficulty.  Passing flatus. Ambulating without dizziness or difficulty.  She denies headache, changes in vision, nausea/vomiting, chest pain, shortness of breath, RUQ pain, or worsening edema. Breastfeeding. Has used Nuvaring before and liked it; understand should be started >42 days postpartum d/t increased risk of blood clots in immediate postpartum period.     Objective:  Vitals:    18 0032 18 0930 18 1614 18 2302   BP: 114/73  135/82 133/81   Pulse: 100 99 106 100   Resp: 16    Temp: 98.1  F (36.7  C) 97.8  F (36.6  C) 98.5  F (36.9  C) 98.2  F (36.8  C)   TempSrc: Oral Oral  Oral   SpO2:       Weight:         General: NAD  CV: well perfused  Pulm:  Normal respiratory effort.  Abd: Soft, non-tender, non-distended. Fundus is firm and below the umbilicus.  Incision c/d/i.     # Pain Assessment:  Current Pain Score 2018   Patient currently in pain? denies   Pain location -   Pain descriptors -   - Yris is experiencing appropriate pain due to surgery. Pain management was discussed and the plan was created in a collaborative fashion.  Yris's response to the current recommendations: engaged  - Please see the plan for pain management as documented below    Assessment/Plan:  Yris Johnson is a 32 year old  female who is POD#3 s/p PLTCS for breech presentation and chronic hypertension.    - CHTN: BPs overall normotensive, few mild range. HELLP wnl  - Encourage routine post-operative goals including ambulation and incentive spirometry  - PNC: Rh pos. Rubella immune. No intervention indicated.  - Pain: controlled on oral medications  - Heme: Hgb 13.1> [id number]> 10.3. Appropriate acute blood loss anemia from surgery. Asymptomatic  - GI: continue anti-emetics and stool  softeners as needed.  - : Voiding spontaneously  - Infant: Stable in room  - Feeding: Plans on breastfeeding.  - BC: Plans on Nuvaring at 6 week postpartum visit    Discharge to home today    Navi So MD  OB/GYN Resident, PGY-2  4/2/2018       Physician Attestation   I, Laure Marsh, personally examined and evaluated this patient.  I discussed the patient with the resident and care team, and agree with the assessment and plan of care as documented in the resident s note of 04/02/18  [date].      I personally reviewed vital signs, medications, labs and exam.    Key findings: Doing well. Has not taken oxycodone post-op. Will reduce post-op prescription to 5 in case she has any breakthrough pain as she is more active, decision made with patient.   Discussed breastfeeding, post-op precautions.   Plans for contraception at 8 weeks.     Laure Marsh  Date of Service (when I saw the patient): 04/02/18

## 2018-04-04 ENCOUNTER — TELEPHONE (OUTPATIENT)
Dept: OBGYN | Facility: CLINIC | Age: 33
End: 2018-04-04

## 2018-04-04 NOTE — TELEPHONE ENCOUNTER
Home care calling with update on patient's BP at home care check today. BP was 134/86. Asymptomatic. Will update SK as an FYI.  Yvonne Schulte

## 2018-05-15 ENCOUNTER — PRENATAL OFFICE VISIT (OUTPATIENT)
Dept: OBGYN | Facility: CLINIC | Age: 33
End: 2018-05-15
Payer: COMMERCIAL

## 2018-05-15 VITALS
HEART RATE: 80 BPM | SYSTOLIC BLOOD PRESSURE: 124 MMHG | TEMPERATURE: 98 F | BODY MASS INDEX: 32.96 KG/M2 | WEIGHT: 192 LBS | DIASTOLIC BLOOD PRESSURE: 84 MMHG

## 2018-05-15 DIAGNOSIS — Z12.4 CERVICAL CANCER SCREENING: Primary | ICD-10-CM

## 2018-05-15 DIAGNOSIS — Z98.891 S/P CESAREAN SECTION: ICD-10-CM

## 2018-05-15 PROCEDURE — 87624 HPV HI-RISK TYP POOLED RSLT: CPT | Performed by: OBSTETRICS & GYNECOLOGY

## 2018-05-15 PROCEDURE — G0145 SCR C/V CYTO,THINLAYER,RESCR: HCPCS | Performed by: OBSTETRICS & GYNECOLOGY

## 2018-05-15 PROCEDURE — 99207 ZZC POST PARTUM EXAM: CPT | Performed by: OBSTETRICS & GYNECOLOGY

## 2018-05-15 NOTE — MR AVS SNAPSHOT
After Visit Summary   5/15/2018    Yris Johnson    MRN: [id number]           Patient Information     Date Of Birth          1985        Visit Information        Provider Department      5/15/2018 11:30 AM Kenia Barbour MD [facility]        Today's Diagnoses     Cervical cancer screening    -  1    S/P  section           Follow-ups after your visit        Your next 10 appointments already scheduled     May 31, 2018 11:30 AM CDT   Office Visit with Kenia Barbour MD   [facility] ([facility])    [address] [place] [id number] [phone]           Bring a current list of meds and any records pertaining to this visit. For Physicals, please bring immunization records and any forms needing to be filled out. Please arrive 10 minutes early to complete paperwork.              Who to contact     If you have questions or need follow up information about today's clinic visit or your schedule please contact [facility] directly at [phone].  Normal or non-critical lab and imaging results will be communicated to you by [employer], letter or phone within 4 business days after the clinic has received the results. If you do not hear from us within 7 days, please contact the clinic through [employer] or phone. If you have a critical or abnormal lab result, we will notify you by phone as soon as possible.  Submit refill requests through [employer] or call your pharmacy and they will forward the refill request to us. Please allow 3 business days for your refill to be completed.          Additional Information About Your Visit        [employer] Information     [employer] gives you secure access to your electronic health record. If you see a primary care provider, you can also send messages to your care team and make appointments. If you have questions, please call your primary care clinic.  If you do not have a  primary care provider, please call [phone] and they will assist you.        Care EveryWhere ID     This is your Care EveryWhere ID. This could be used by other organizations to access your [place] medical records  [id number]        Your Vitals Were     Pulse Temperature Last Period Breastfeeding? BMI (Body Mass Index)       80 98  F (36.7  C) (Oral) 07/03/2017 Yes 32.96 kg/m2        Blood Pressure from Last 3 Encounters:   05/15/18 124/84   04/02/18 (!) 145/91   03/27/18 130/82    Weight from Last 3 Encounters:   05/15/18 192 lb (87.1 kg)   03/30/18 223 lb (101.2 kg)   03/27/18 223 lb (101.2 kg)              We Performed the Following     Pap imaged thin layer screen with HPV - recommended age 30 - 65 years (select HPV order below)        Primary Care Provider Office Phone # Fax #    Kenia Witt [phone] [address] [facility] [address]        Equal Access to Services     [facility]: Hadii dana Harmon, waaxda lumarineadaha, qaybta kaalmaelana ashraf, phani starr . So [facility] [phone].    ATENCIÓN: Si habla español, tiene a schulz disposición servicios gratuitos de asistencia lingüística. Llame al [phone].    We comply with applicable federal civil rights laws and [place] laws. We do not discriminate on the basis of race, color, national origin, age, disability, sex, sexual orientation, or gender identity.            Thank you!     Thank you for choosing [facility]  for your care. Our goal is always to provide you with excellent care. Hearing back from our patients is one way we can continue to improve our services. Please take a few minutes to complete the written survey that you may receive in the mail after your visit with us. Thank you!             Your Updated Medication List - Protect others around you: Learn how to safely use, store and throw away your medicines at www.disposemymeds.org.           This list is accurate as of 5/15/18 11:59 PM.  Always use your most recent med list.                   Brand Name Dispense Instructions for use Diagnosis    acetaminophen 325 MG tablet    TYLENOL    60 tablet    Take 2 tablets (650 mg) by mouth every 4 hours as needed for mild pain    S/P  section       ibuprofen 600 MG tablet    ADVIL/MOTRIN    60 tablet    Take 1 tablet (600 mg) by mouth every 6 hours as needed for other (cramping)    S/P  section       MAGNESIUM OXIDE PO           oxyCODONE IR 5 MG tablet    ROXICODONE    5 tablet    Take 1-2 tablets (5-10 mg) by mouth every 3 hours as needed for other (pain control or improvement in physical function. Hold dose for analgesic side effects.)    S/P  section       PRENATAL 1 PO           senna-docusate 8.6-50 MG per tablet    SENOKOT-S;PERICOLACE    100 tablet    Take 1 tablet by mouth 2 times daily as needed for constipation    S/P  section

## 2018-05-15 NOTE — PROGRESS NOTES
OB GYN CLINIC VISIT  5/15/2018    CC: postpartum visit    SUBJECTIVE:  Yris Johnson is a 32 year old female  here for a postpartum visit.  She had a  Section  on 3/30/18 delivering a healthy baby girl weighing 8 lbs 2.9 oz at 38w4d.        delivery complications:  No  breast feeding:  Yes  bladder problems:  No  bowel problems/hemorrhoids:  No  episiotomy/laceration/incision healed? Yes  vaginal flow:  None  [place]:  No  contraception:  IUD  emotional adjustment:  doing well and happy  back to work:  Mid , changing jobs    Current Outpatient Prescriptions   Medication     acetaminophen (TYLENOL) 325 MG tablet     ibuprofen (ADVIL/MOTRIN) 600 MG tablet     Prenatal MV-Min-Fe Fum-FA-DHA (PRENATAL 1 PO)     senna-docusate (SENOKOT-S;PERICOLACE) 8.6-50 MG per tablet     MAGNESIUM OXIDE PO     oxyCODONE IR (ROXICODONE) 5 MG tablet     No current facility-administered medications for this visit.        OBJECTIVE:  Last menstrual period 2017, unknown if currently breastfeeding.   General - pleasant female in no acute distress.  Breast - no nodularity, asymmetry or nipple discharge bilaterally.  Abdomen - soft, nontender, nondistended, no hepatosplenomegaly.  Pelvic - EG: normal adult female, BUS: within normal limits, Vagina: well rugated, no discharge, Cervix: normal, no lesions or CMT, Uterus: firm, normal sized and nontender, Adnexae: no masses or tenderness.  Rectovaginal - deferred.    ASSESSMENT:  32 year old  with normal postpartum exam    PLAN:  Discussed kegel exercises   May resume normal activities without restrictions  Pap smear was done today    The patient will use IUD for birth control. Full counseling was provided, and all questions answered. Recommended abstinence until IUD placement in 2+w.  Return to clinic in one year for an annual, when due for a pap smear or PRN.    Kenia Barbour MD

## 2018-05-21 LAB
COPATH REPORT: NORMAL
PAP: NORMAL

## 2018-05-23 LAB
FINAL DIAGNOSIS: ABNORMAL
HPV HR 12 DNA CVX QL NAA+PROBE: POSITIVE
HPV16 DNA SPEC QL NAA+PROBE: NEGATIVE
HPV18 DNA SPEC QL NAA+PROBE: NEGATIVE
SPECIMEN DESCRIPTION: ABNORMAL
SPECIMEN SOURCE CVX/VAG CYTO: ABNORMAL

## 2018-05-31 ENCOUNTER — OFFICE VISIT (OUTPATIENT)
Dept: OBGYN | Facility: CLINIC | Age: 33
End: 2018-05-31
Payer: COMMERCIAL

## 2018-05-31 VITALS
BODY MASS INDEX: 32.61 KG/M2 | SYSTOLIC BLOOD PRESSURE: 122 MMHG | HEART RATE: 80 BPM | WEIGHT: 190 LBS | TEMPERATURE: 97.4 F | DIASTOLIC BLOOD PRESSURE: 84 MMHG

## 2018-05-31 DIAGNOSIS — Z30.430 ENCOUNTER FOR INSERTION OF INTRAUTERINE CONTRACEPTIVE DEVICE (IUD): Primary | ICD-10-CM

## 2018-05-31 LAB — BETA HCG QUAL IFA URINE: NEGATIVE

## 2018-05-31 PROCEDURE — 58300 INSERT INTRAUTERINE DEVICE: CPT | Performed by: OBSTETRICS & GYNECOLOGY

## 2018-05-31 PROCEDURE — 84703 CHORIONIC GONADOTROPIN ASSAY: CPT | Performed by: OBSTETRICS & GYNECOLOGY

## 2018-05-31 NOTE — MR AVS SNAPSHOT
After Visit Summary   5/31/2018    Yris Johnson    MRN: [id number]           Patient Information     Date Of Birth          1985        Visit Information        Provider Department      5/31/2018 11:30 AM Kenia Barbour MD [facility]        Today's Diagnoses     Encounter for insertion of intrauterine contraceptive device (IUD)    -  1       Follow-ups after your visit        Who to contact     If you have questions or need follow up information about today's clinic visit or your schedule please contact [facility] directly at [phone].  Normal or non-critical lab and imaging results will be communicated to you by [employer], letter or phone within 4 business days after the clinic has received the results. If you do not hear from us within 7 days, please contact the clinic through [employer] or phone. If you have a critical or abnormal lab result, we will notify you by phone as soon as possible.  Submit refill requests through [employer] or call your pharmacy and they will forward the refill request to us. Please allow 3 business days for your refill to be completed.          Additional Information About Your Visit        MyChart Information     [employer] gives you secure access to your electronic health record. If you see a primary care provider, you can also send messages to your care team and make appointments. If you have questions, please call your primary care clinic.  If you do not have a primary care provider, please call [phone] and they will assist you.        Care EveryWhere ID     This is your Care EveryWhere ID. This could be used by other organizations to access your [place] medical records  [id number]        Your Vitals Were     Pulse Temperature BMI (Body Mass Index)             80 97.4  F (36.3  C) (Oral) 32.61 kg/m2          Blood Pressure from Last 3 Encounters:   05/31/18 122/84   05/15/18 124/84   04/02/18 (!) 145/91    Weight from Last  3 Encounters:   05/31/18 190 lb (86.2 kg)   05/15/18 192 lb (87.1 kg)   03/30/18 223 lb (101.2 kg)              We Performed the Following     Beta HCG Qual, Urine - [facility] and [place] ([id number])     HC LEVONORGESTREL IU 52MG 5 YR     INSERTION INTRAUTERINE DEVICE          Today's Medication Changes          These changes are accurate as of 5/31/18  2:20 PM.  If you have any questions, ask your nurse or doctor.               Start taking these medicines.        Dose/Directions    levonorgestrel 20 MCG/24HR IUD   Commonly known as:  MIRENA   Used for:  Encounter for insertion of intrauterine contraceptive device (IUD)   Started by:  Kenia Barbour MD        Dose:  1 each   1 each (20 mcg) by Intrauterine route once for 1 dose   Quantity:  1 each   Refills:  0            Where to get your medicines      Some of these will need a paper prescription and others can be bought over the counter.  Ask your nurse if you have questions.     You don't need a prescription for these medications     levonorgestrel 20 MCG/24HR IUD                Primary Care Provider Office Phone # Fax #    Kenia Witt [phone] [address] [address]        Equal Access to Services     [facility]: Hadii dana ku hadasho Soomaali, waaxda luqadaha, qaybta kaalmada adeegyada, waxay freddiein haysierra alicea. So [facility] [phone].    ATENCIÓN: Si habla español, tiene a schulz disposición servicios gratuitos de asistencia lingüística. Llame al [phone].    We comply with applicable federal civil rights laws and [place] laws. We do not discriminate on the basis of race, color, national origin, age, disability, sex, sexual orientation, or gender identity.            Thank you!     Thank you for choosing [facility]  for your care. Our goal is always to provide you with excellent care. Hearing back from our patients is one way we can continue to improve our  services. Please take a few minutes to complete the written survey that you may receive in the mail after your visit with us. Thank you!             Your Updated Medication List - Protect others around you: Learn how to safely use, store and throw away your medicines at www.disposemymeds.org.          This list is accurate as of 18  2:20 PM.  Always use your most recent med list.                   Brand Name Dispense Instructions for use Diagnosis    acetaminophen 325 MG tablet    TYLENOL    60 tablet    Take 2 tablets (650 mg) by mouth every 4 hours as needed for mild pain    S/P  section       ibuprofen 600 MG tablet    ADVIL/MOTRIN    60 tablet    Take 1 tablet (600 mg) by mouth every 6 hours as needed for other (cramping)    S/P  section       levonorgestrel 20 MCG/24HR IUD    MIRENA    1 each    1 each (20 mcg) by Intrauterine route once for 1 dose    Encounter for insertion of intrauterine contraceptive device (IUD)       MAGNESIUM OXIDE PO           oxyCODONE IR 5 MG tablet    ROXICODONE    5 tablet    Take 1-2 tablets (5-10 mg) by mouth every 3 hours as needed for other (pain control or improvement in physical function. Hold dose for analgesic side effects.)    S/P  section       PRENATAL 1 PO           senna-docusate 8.6-50 MG per tablet    SENOKOT-S;PERICOLACE    100 tablet    Take 1 tablet by mouth 2 times daily as needed for constipation    S/P  section

## 2018-05-31 NOTE — NURSING NOTE
"No chief complaint on file.  Placement NDC:[id number] LOT:[id number] EXP:2020 IUD    Initial /84 (BP Location: Left arm, Patient Position: Sitting, Cuff Size: Adult Regular)  Pulse 80  Temp 97.4  F (36.3  C) (Oral)  Wt 190 lb (86.2 kg)  BMI 32.61 kg/m2 Estimated body mass index is 32.61 kg/(m^2) as calculated from the following:    Height as of 3/22/18: 5' 4\" (1.626 m).    Weight as of this encounter: 190 lb (86.2 kg).  BP completed using cuff size: regular        The following HM Due: NONE      The following patient reported/Care Every where data was sent to:  P ABSTRACT QUALITY INITIATIVES [18176]  ATSRID Rey             "

## 2018-05-31 NOTE — PROGRESS NOTES
GYN clinic visit  2018    CC: IUD insertion    HPI:   Yris Johnson is a 32 year old  who presents to clinic today for an IUD insertion.  She has used NuvaRing for contraception in the past. She has a history of no STI and most recently had negative testing for GC/chlamydia in . She denies current abnormal vaginal discharge. Her LMP was pre-pregnancy.  Has not yet resumed menstruation or intercourse since delivery.  Last Pap smear was 5/15/18.     Reviewed GYN hx, OB hx, past medical hx, surgical hx and family hx.   Reviewed medications and allergies. Changes made in EPIC.     OBJECTIVE:  Vitals:    18 1132   BP: 122/84   BP Location: Left arm   Patient Position: Sitting   Cuff Size: Adult Regular   Pulse: 80   Temp: 97.4  F (36.3  C)   TempSrc: Oral   Weight: 190 lb (86.2 kg)     Body mass index is 32.61 kg/(m^2).    Gen: alert, oriented, no distress, cooperative and pleasant  Abd: soft, nontender, nondistended, normoactive bowel sounds, no masses,  scars  : normal external genitalia without lesions or abnormalities. Normal Bartholin's, normal [place]'s, normal urethra. Normal vaginal mucosa, no abnormal discharge or lesions. Cervix appears WNL, no lesions or erythema.     PROCEDURE:  Patient has verbalized understanding of risks and benefits. She was counseled on risks of infection, bleeding, uterine perforation, cervical laceration, expulsion, overall risk of pregnancy 2 in 1000, if she does get pregnant that there is an increased risk of ectopic pregnancy. All questions answered. She has signed the consent form.    Urine pregnancy test was negative.      A regular Mateus speculum was placed in the vagina with good visualization of the cervix.  The cervix was then swabbed with a betadine x3.  Tenaculum was placed at the 12 o'clock position on the cervix and the uterus sounded to 8cm.  The Mirena  IUD was then placed in the usual fashion under sterile technique without difficulty.   Strings were clipped about 2-3 cm from the cervical os.  Tenaculum was removed and cervix was hemostatic after applying gentle pressure.  There were no complications. The patient tolerated the procedure well.      ASSESSMENT:   Yris Johnson is a 32 year old  who had a Mirena IUD inserted today without complication.    PLAN:  1. Contraception-IUD placement      The patient should feel for the IUD strings in 2 weeks.  If unable to locate them, she should return to clinic for a speculum examination for confirmation that the IUD is in place. Bleeding pattern of this particular IUD was discussed with the patient. She is aware that the IUD will need to be removed in 5 years or PRN.  She is to return to clinic for her next annual or PRN.    Kenia Barbour MD

## 2019-04-03 ENCOUNTER — OFFICE VISIT (OUTPATIENT)
Dept: FAMILY MEDICINE | Facility: CLINIC | Age: 34
End: 2019-04-03
Payer: COMMERCIAL

## 2019-04-03 VITALS
RESPIRATION RATE: 16 BRPM | BODY MASS INDEX: 33.97 KG/M2 | HEART RATE: 94 BPM | SYSTOLIC BLOOD PRESSURE: 127 MMHG | HEIGHT: 64 IN | DIASTOLIC BLOOD PRESSURE: 85 MMHG | OXYGEN SATURATION: 98 % | TEMPERATURE: 98.5 F | WEIGHT: 199 LBS

## 2019-04-03 DIAGNOSIS — D22.9 CHANGE IN MOLE: ICD-10-CM

## 2019-04-03 DIAGNOSIS — Z00.00 WELL ADULT EXAM: Primary | ICD-10-CM

## 2019-04-03 PROCEDURE — G0145 SCR C/V CYTO,THINLAYER,RESCR: HCPCS | Performed by: FAMILY MEDICINE

## 2019-04-03 PROCEDURE — 87624 HPV HI-RISK TYP POOLED RSLT: CPT | Performed by: FAMILY MEDICINE

## 2019-04-03 PROCEDURE — 99395 PREV VISIT EST AGE 18-39: CPT | Performed by: FAMILY MEDICINE

## 2019-04-03 ASSESSMENT — MIFFLIN-ST. JEOR: SCORE: 1597.66

## 2019-04-03 NOTE — PROGRESS NOTES
"   SUBJECTIVE:   CC: Yris Johnson is an 33 year old woman who presents for preventive health visit.     Physical   Annual:     Getting at least 3 servings of Calcium per day:  Yes    Bi-annual eye exam:  Yes    Dental care twice a year:  Yes    Sleep apnea or symptoms of sleep apnea:  None    Diet:  Regular (no restrictions)    Frequency of exercise:  6-7 days/week    Duration of exercise:  30-45 minutes    Taking medications regularly:  Yes    Medication side effects:  None    Additional concerns today:  No    PHQ-2 Total Score: 0    Due for pap and hpv  See PMH    Has enlarging mole along left sdie neck  Feels bigger and more \"gelatinous\"  Has been present long term but growing      PROBLEMS TO ADD ON...    Today's PHQ-2 Score:   PHQ-2 ( 1999 Pf[employer]) 4/3/2019   Q1: Little interest or pleasure in doing things 0   Q2: Feeling down, depressed or hopeless 0   PHQ-2 Score 0   Q1: Little interest or pleasure in doing things Not at all   Q2: Feeling down, depressed or hopeless Not at all   PHQ-2 Score 0       Abuse: Current or Past(Physical, Sexual or Emotional)- No  Do you feel safe in your environment? Yes    Social History     Tobacco Use     Smoking status: Never Smoker     Smokeless tobacco: Never Used   Substance Use Topics     Alcohol use: Yes     Comment: Once a week     Alcohol Use 4/3/2019   If you drink alcohol do you typically have greater than 3 drinks per day OR greater than 7 drinks per week? No       Reviewed orders with patient.  Reviewed health maintenance and updated orders accordingly - Yes  Labs reviewed in EPIC    Mammogram not appropriate for this patient based on age.    Pertinent mammograms are reviewed under the imaging tab.  History of abnormal Pap smear: NO - age 30- 65 PAP every 3 years recommended  PAP / HPV Latest Ref Rng & Units 5/15/2018 3/17/2016 3/5/2015   PAP - NIL NIL ASC-US(A)   HPV 16 DNA NEG:Negative Negative Negative -   HPV 18 DNA NEG:Negative Negative Negative -   OTHER HR HPV " "NEG:Negative Positive(A) Negative -     Reviewed and updated as needed this visit by clinical staff  Tobacco  Allergies  Meds         Reviewed and updated as needed this visit by Provider        Past Medical History:   Diagnosis Date     ASCUS favor benign 3/2015    neg HPV, plan cotest in 1 yr.     GLENNA I (cervical intraepithelial neoplasia I) 2008    outside clinic, poss GLENNA II     GLENNA II (cervical intraepithelial neoplasia II) 2008    colp GLENNA 2, [place], [place]     History of eating disorder  to       Past Surgical History:   Procedure Laterality Date      SECTION N/A 3/30/2018    Procedure:  SECTION;  Primary  Section ;  Surgeon: Kenia Barbour MD;  Location:  L+D      TOOTH EXTRACTION W/FORCEP         Review of Systems  CONSTITUTIONAL: NEGATIVE for fever, chills, change in weight  INTEGUMENTARU/SKIN: NEGATIVE for worrisome rashes, moles or lesions  EYES: NEGATIVE for vision changes or irritation  ENT: NEGATIVE for ear, mouth and throat problems  RESP: NEGATIVE for significant cough or SOB  BREAST: NEGATIVE for masses, tenderness or discharge  CV: NEGATIVE for chest pain, palpitations or peripheral edema  GI: NEGATIVE for nausea, abdominal pain, heartburn, or change in bowel habits  : NEGATIVE for unusual urinary or vaginal symptoms. Periods are regular.  MUSCULOSKELETAL: NEGATIVE for significant arthralgias or myalgia  NEURO: NEGATIVE for weakness, dizziness or paresthesias  PSYCHIATRIC: NEGATIVE for changes in mood or affect     OBJECTIVE:   /85 (BP Location: Right arm, Cuff Size: Adult Large)   Pulse 94   Temp 98.5  F (36.9  C)   Resp 16   Ht 1.626 m (5' 4\")   Wt 90.3 kg (199 lb)   SpO2 98%   BMI 34.16 kg/m    Physical Exam  GENERAL: healthy, alert and no distress  EYES: Eyes grossly normal to inspection, PERRL and conjunctivae and sclerae normal  HENT: ear canals and TM's normal, nose and mouth without ulcers or lesions  NECK: no adenopathy, no " "asymmetry, masses, or scars and thyroid normal to palpation  RESP: lungs clear to auscultation - no rales, rhonchi or wheezes  BREAST: normal without masses, tenderness or nipple discharge and no palpable axillary masses or adenopathy  CV: regular rate and rhythm, normal S1 S2, no S3 or S4, no murmur, click or rub, no peripheral edema and peripheral pulses strong  ABDOMEN: soft, nontender, no hepatosplenomegaly, no masses and bowel sounds normal   (female): normal female external genitalia, normal urethral meatus, vaginal mucosa pink, moist, well rugated, and normal cervix/adnexa/uterus without masses or discharge  MS: no gross musculoskeletal defects noted, no edema  SKIN:  Raised dark brown mole alogn left side of her neck  Rubbery, well circumscribed 7mm size  NEURO: Normal strength and tone, mentation intact and speech normal  PSYCH: mentation appears normal, affect normal/bright    Diagnostic Test Results:  none     ASSESSMENT/PLAN:   1. Well adult exam  See avs  - Pap imaged thin layer screen with HPV - recommended age 30 - 65 years (select HPV order below)  - HPV High Risk Types DNA Cervical    2. Change in mole  Enlarging mole  Changes in consistency - discussed and encouraged to have this removed and sent for pathology      COUNSELING:  Reviewed preventive health counseling, as reflected in patient instructions       Regular exercise       Healthy diet/nutrition    BP Readings from Last 1 Encounters:   04/03/19 127/85     Estimated body mass index is 34.16 kg/m  as calculated from the following:    Height as of this encounter: 1.626 m (5' 4\").    Weight as of this encounter: 90.3 kg (199 lb).           reports that  has never smoked. she has never used smokeless tobacco.      Counseling Resources:  ATP IV Guidelines  Pooled Cohorts Equation Calculator  Breast Cancer Risk Calculator  FRAX Risk Assessment  ICSI Preventive Guidelines  Dietary Guidelines for Americans, 2010  USDA's MyPlate  ASA " Prophylaxis  Lung CA Screening    Kenia Witt MD  [facility]

## 2019-04-03 NOTE — NURSING NOTE
"Chief Complaint   Patient presents with     Physical     Derm Problem     mole on the back of her neck     initial /85 (BP Location: Right arm, Cuff Size: Adult Large)   Pulse 94   Temp 98.5  F (36.9  C)   Resp 16   Ht 1.626 m (5' 4\")   Wt 90.3 kg (199 lb)   SpO2 98%   BMI 34.16 kg/m   Estimated body mass index is 34.16 kg/m  as calculated from the following:    Height as of this encounter: 1.626 m (5' 4\").    Weight as of this encounter: 90.3 kg (199 lb).  BP completed using cuff size: large.   R arm      Health Maintenance that is potentially due pending provider review:  Pap Smear    PAP--Possibly completing today, per Provider review    Pancho Valle ma  "

## 2019-04-05 LAB
COPATH REPORT: NORMAL
PAP: NORMAL

## 2019-04-09 LAB
FINAL DIAGNOSIS: NORMAL
HPV HR 12 DNA CVX QL NAA+PROBE: NEGATIVE
HPV16 DNA SPEC QL NAA+PROBE: NEGATIVE
HPV18 DNA SPEC QL NAA+PROBE: NEGATIVE
SPECIMEN DESCRIPTION: NORMAL
SPECIMEN SOURCE CVX/VAG CYTO: NORMAL

## 2019-06-19 ENCOUNTER — MYC MEDICAL ADVICE (OUTPATIENT)
Dept: FAMILY MEDICINE | Facility: CLINIC | Age: 34
End: 2019-06-19

## 2019-06-24 ENCOUNTER — MYC MEDICAL ADVICE (OUTPATIENT)
Dept: FAMILY MEDICINE | Facility: CLINIC | Age: 34
End: 2019-06-24

## 2019-06-24 ENCOUNTER — OFFICE VISIT (OUTPATIENT)
Dept: FAMILY MEDICINE | Facility: CLINIC | Age: 34
End: 2019-06-24
Payer: COMMERCIAL

## 2019-06-24 VITALS
HEART RATE: 78 BPM | DIASTOLIC BLOOD PRESSURE: 88 MMHG | TEMPERATURE: 98 F | WEIGHT: 200.5 LBS | BODY MASS INDEX: 34.23 KG/M2 | SYSTOLIC BLOOD PRESSURE: 128 MMHG | HEIGHT: 64 IN | OXYGEN SATURATION: 99 %

## 2019-06-24 DIAGNOSIS — F41.9 ANXIETY: Primary | ICD-10-CM

## 2019-06-24 DIAGNOSIS — Z30.011 ENCOUNTER FOR INITIAL PRESCRIPTION OF CONTRACEPTIVE PILLS: ICD-10-CM

## 2019-06-24 DIAGNOSIS — Z30.432 ENCOUNTER FOR IUD REMOVAL: ICD-10-CM

## 2019-06-24 PROCEDURE — 99213 OFFICE O/P EST LOW 20 MIN: CPT | Mod: 25 | Performed by: FAMILY MEDICINE

## 2019-06-24 PROCEDURE — 58301 REMOVE INTRAUTERINE DEVICE: CPT | Performed by: FAMILY MEDICINE

## 2019-06-24 RX ORDER — LORAZEPAM 0.5 MG/1
0.5 TABLET ORAL EVERY 8 HOURS PRN
Qty: 20 TABLET | Refills: 1 | Status: SHIPPED | OUTPATIENT
Start: 2019-06-24 | End: 2020-03-11

## 2019-06-24 RX ORDER — NORETHINDRONE ACETATE AND ETHINYL ESTRADIOL 1MG-20(21)
1 KIT ORAL DAILY
Qty: 90 TABLET | Refills: 3 | Status: SHIPPED | OUTPATIENT
Start: 2019-06-24 | End: 2021-02-04 | Stop reason: ALTCHOICE

## 2019-06-24 ASSESSMENT — MIFFLIN-ST. JEOR: SCORE: 1596.52

## 2019-06-24 NOTE — NURSING NOTE
"Chief Complaint   Patient presents with     Contraception     patient here for IUD removal and to discuss other BC options      Medication Request     Ativan 0.5mg     /88   Pulse 78   Temp 98  F (36.7  C) (Oral)   Ht 1.613 m (5' 3.5\")   Wt 90.9 kg (200 lb 8 oz)   SpO2 99%   BMI 34.96 kg/m   Estimated body mass index is 34.96 kg/m  as calculated from the following:    Height as of this encounter: 1.613 m (5' 3.5\").    Weight as of this encounter: 90.9 kg (200 lb 8 oz).  Medication Reconciliation: complete      Health Maintenance that is potentially due pending provider review:  NONE    n/a    SHANNAN Castro  "

## 2019-06-24 NOTE — TELEPHONE ENCOUNTER
SN  Please see [employer] message below.  Patient is scheduled to see you today at 1:30 pm.    Reason on appointment is anxiety.  Ok to add IUD removal or would you like separate appointment?    Kimber Bryant RN

## 2019-06-24 NOTE — PROGRESS NOTES
"Subjective     Yris Johnson is a 33 year old female who presents to clinic today for the following health issues:    Chief Complaint   Patient presents with     Contraception     patient here for IUD removal and to discuss other BC options      Medication Request     Ativan 0.5mg      HPI   Medication Followup of Ativan 0.5mg    Taking Medication as prescribed: yes, takes PRN for situational anxiety     Side Effects:  None    Medication Helping Symptoms:  yes     Needs refill of med for flying - tavel related anxiety    Would like IUD removed - has had this for 15 months - her  is getting vasectomy  Feels it is causing weight gain and acne and worsened anxiety.  Is ok to use OCP until he has procedure            Reviewed and updated as needed this visit by Provider         Review of Systems   ROS COMP: Constitutional, HEENT, cardiovascular, pulmonary, gi and gu systems are negative, except as otherwise noted.      Objective    /88   Pulse 78   Temp 98  F (36.7  C) (Oral)   Ht 1.613 m (5' 3.5\")   Wt 90.9 kg (200 lb 8 oz)   SpO2 99%   BMI 34.96 kg/m    Body mass index is 34.96 kg/m .  Physical Exam   GENERAL: healthy, alert and no distress   (female): normal female external genitalia, normal urethral meatus, vaginal mucosa, normal cervix/adnexa/uterus without masses or discharge  PSYCH: mentation appears normal, affect normal/bright    Diagnostic Test Results:  Labs reviewed in Epic      Procedure:    iud strings grasped with sterile ring forceps and gentle steady traction used to remove IUD  Tolerated well No bleeding noted    Assessment & Plan     1. Encounter for initial prescription of contraceptive pills  reviewed starting OCP  - norethindrone-ethinyl estradiol (JUNEL FE 1/20) 1-20 MG-MCG tablet; Take 1 tablet by mouth daily  Dispense: 90 tablet; Refill: 3    2. Anxiety  PRN use for flights and for occasional panic attcks  - LORazepam (ATIVAN) 0.5 MG tablet; Take 1 tablet (0.5 mg) by mouth " "every 8 hours as needed for anxiety  Dispense: 20 tablet; Refill: 1    3. Encounter for IUD removal  Removed  Observe for pain or bleeding  OCp as noted above  - REMOVE INTRAUTERINE DEVICE     BMI:   Estimated body mass index is 34.96 kg/m  as calculated from the following:    Height as of this encounter: 1.613 m (5' 3.5\").    Weight as of this encounter: 90.9 kg (200 lb 8 oz).   Weight management plan: Discussed healthy diet and exercise guidelines        See me for wwe as indicated  Sooner if any concerns    No follow-ups on file.    Kenia Witt MD  [facility]      "

## 2019-09-19 NOTE — NURSING NOTE
"Chief Complaint   Patient presents with     Prenatal Care     32w1d       Initial /84  Pulse 102  Temp 98.2  F (36.8  C) (Oral)  Wt 209 lb 12.8 oz (95.2 kg)  LMP 07/03/2017  SpO2 96%  BMI 36.01 kg/m2 Estimated body mass index is 36.01 kg/(m^2) as calculated from the following:    Height as of 8/29/17: 5' 4\" (1.626 m).    Weight as of this encounter: 209 lb 12.8 oz (95.2 kg).  Medication Reconciliation: complete   Regina Fay CMA  Screening Questionnaire for Adult Immunization    Are you sick today?   No   Do you have allergies to medications, food, a vaccine component or latex?   No   Have you ever had a serious reaction after receiving a vaccination?   No   Do you have a long-term health problem with heart disease, lung disease, asthma, kidney disease, metabolic disease (e.g. diabetes), anemia, or other blood disorder?   No   Do you have cancer, leukemia, HIV/AIDS, or any other immune system problem?   No   In the past 3 months, have you taken medications that affect  your immune system, such as prednisone, other steroids, or anticancer drugs; drugs for the treatment of rheumatoid arthritis, Crohn s disease, or psoriasis; or have you had radiation treatments?   No   Have you had a seizure, or a brain or other nervous system problem?   No   During the past year, have you received a transfusion of blood or blood     products, or been given immune (gamma) globulin or antiviral drug?   No   For women: Are you pregnant or is there a chance you could become        pregnant during the next month?   Yes   Have you received any vaccinations in the past 4 weeks?   No     Immunization questionnaire was positive for at least one answer.  Notified Jo Castro        Per orders of Jo Castro , injection of TDAP given by Regina Fay. Patient instructed to remain in clinic for 15 minutes afterwards, and to report any adverse reaction to me immediately.       Screening performed by Regina" Sumeet on 2/13/2018 at 4:26 PM.     Advancement Flap (Single) Text: The defect edges were debeveled with a #15 scalpel blade.  Given the location of the defect and the proximity to free margins a single advancement flap was deemed most appropriate.  Using a sterile surgical marker, an appropriate advancement flap was drawn incorporating the defect and placing the expected incisions within the relaxed skin tension lines where possible.    The area thus outlined was incised deep to adipose tissue with a #15 scalpel blade.  The skin margins were undermined to an appropriate distance in all directions utilizing iris scissors.

## 2019-10-30 ENCOUNTER — TELEPHONE (OUTPATIENT)
Dept: FAMILY MEDICINE | Facility: CLINIC | Age: 34
End: 2019-10-30

## 2019-10-30 NOTE — TELEPHONE ENCOUNTER
Reason for Call:  Other appointment    Detailed comments: Pt would like to scheduling the mole removal that her and Dr. Aquino have talked about at last OV.     Phone Number Patient can be reached at: Cell number on file:    Telephone Information:   Mobile [phone]       Best Time: Any    Can we leave a detailed message on this number? YES    Call taken on 10/30/2019 at 1:33 PM by [place]

## 2019-11-13 ENCOUNTER — OFFICE VISIT (OUTPATIENT)
Dept: FAMILY MEDICINE | Facility: CLINIC | Age: 34
End: 2019-11-13
Payer: COMMERCIAL

## 2019-11-13 VITALS
RESPIRATION RATE: 18 BRPM | HEART RATE: 107 BPM | OXYGEN SATURATION: 98 % | WEIGHT: 194.8 LBS | DIASTOLIC BLOOD PRESSURE: 89 MMHG | BODY MASS INDEX: 33.26 KG/M2 | TEMPERATURE: 97.4 F | SYSTOLIC BLOOD PRESSURE: 128 MMHG | HEIGHT: 64 IN

## 2019-11-13 DIAGNOSIS — D22.4 ATYPICAL MOLE OF NECK: Primary | ICD-10-CM

## 2019-11-13 PROCEDURE — 11306 SHAVE SKIN LESION 0.6-1.0 CM: CPT | Performed by: FAMILY MEDICINE

## 2019-11-13 ASSESSMENT — MIFFLIN-ST. JEOR: SCORE: 1565.67

## 2019-11-13 NOTE — NURSING NOTE
"Chief Complaint   Patient presents with     Lesion Removal     /89   Pulse 107   Temp 97.4  F (36.3  C) (Oral)   Resp 18   Ht 1.613 m (5' 3.5\")   Wt 88.4 kg (194 lb 12.8 oz)   SpO2 98%   BMI 33.97 kg/m   Estimated body mass index is 33.97 kg/m  as calculated from the following:    Height as of this encounter: 1.613 m (5' 3.5\").    Weight as of this encounter: 88.4 kg (194 lb 12.8 oz).  bp completed using cuff size: large      Health Maintenance addressed:  NONE    n/a    Sayra Payne MA    "

## 2019-11-13 NOTE — PROGRESS NOTES
"Subjective     Yris Johnson is a 34 year old female who presents to clinic today for the following health issues:    HPI   Mole removal      Left side of neck      Growing bigger and \"puffier\"  Would like this removed    Has had this for years but changing         Patient Active Problem List   Diagnosis     CARDIOVASCULAR SCREENING; LDL GOAL LESS THAN 160     Anxiety     GLENNA II (cervical intraepithelial neoplasia II)     Need for Tdap vaccination     History of eating disorder     Encounter for supervision of normal first pregnancy in third trimester     S/P  section     Past Surgical History:   Procedure Laterality Date      SECTION N/A 3/30/2018    Procedure:  SECTION;  Primary  Section ;  Surgeon: Kenia Barbour MD;  Location:  L[facility]D      TOOTH EXTRACTION W/FORCEP         Social History     Tobacco Use     Smoking status: Never Smoker     Smokeless tobacco: Never Used   Substance Use Topics     Alcohol use: Yes     Comment: Once a week     Family History   Problem Relation Age of Onset     Asthma Father      Diabetes Paternal Grandfather      Cerebrovascular Disease Paternal Grandfather      Asthma Sister      Cancer Paternal Aunt      Cancer Paternal Aunt          Current Outpatient Medications   Medication Sig Dispense Refill     LORazepam (ATIVAN) 0.5 MG tablet Take 1 tablet (0.5 mg) by mouth every 8 hours as needed for anxiety 20 tablet 1     norethindrone-ethinyl estradiol (JUNEL ) 1-20 MG-MCG tablet Take 1 tablet by mouth daily 90 tablet 3     acetaminophen (TYLENOL) 325 MG tablet Take 2 tablets (650 mg) by mouth every 4 hours as needed for mild pain (Patient not taking: Reported on 2019) 60 tablet 0     ibuprofen (ADVIL/MOTRIN) 600 MG tablet Take 1 tablet (600 mg) by mouth every 6 hours as needed for other (cramping) (Patient not taking: Reported on 2019) 60 tablet 0     MAGNESIUM OXIDE PO          Reviewed and updated as needed this visit by " "Provider         Review of Systems   ROS COMP: Constitutional, HEENT, cardiovascular, pulmonary, gi and gu systems are negative, except as otherwise noted.      Objective    /89   Pulse 107   Temp 97.4  F (36.3  C) (Oral)   Resp 18   Ht 1.613 m (5' 3.5\")   Wt 88.4 kg (194 lb 12.8 oz)   SpO2 98%   BMI 33.97 kg/m    There is no height or weight on file to calculate BMI.  Physical Exam   GENERAL: healthy, alert and no distress  SKIN: left side of neck has a rased rough mole that is pinkish- brown in color and well circumscribed.  It is about 7mm wide and the base appears to have expanded along one side    Procedure:      After informed consent was obtained, using Betadine for cleansing and 2% Lidocaine with epinephrine for anesthetic, with sterile technique, shave excision was performed using a flat razor, lesion was 6-7mm wide depth of insicion was flush with skin. Slow bleeding noted following removal and this ws cauterized with both silver nitrate and cautery with good hemostasis after observation for a few minutes.  Antibiotic dressing is applied, and wound care instructions provided.  Be alert for any signs of cutaneous infection. The procedure was well tolerated without complications. Follow up: The specimen is labelled and sent to pathology for evaluation..  Diagnostic Test Results:  Pathology pending        Assessment & Plan     1. Atypical mole of neck   changing in shape and removed as a result  - EXC BENIGN SKIN LESION SCLP/NCK/HNDS/FEET/GEN 0.6-1.0 CM     BMI:   Estimated body mass index is 33.97 kg/m  as calculated from the following:    Height as of this encounter: 1.613 m (5' 3.5\").    Weight as of this encounter: 88.4 kg (194 lb 12.8 oz).           Wound care reviewed with her  See me with any bleeding or concerns    No follow-ups on file.    Kenia Witt MD  [facility]    "

## 2019-11-14 ENCOUNTER — DOCUMENTATION ONLY (OUTPATIENT)
Dept: FAMILY MEDICINE | Facility: CLINIC | Age: 34
End: 2019-11-14
Payer: COMMERCIAL

## 2019-11-14 DIAGNOSIS — D22.9 ATYPICAL MOLE: Primary | ICD-10-CM

## 2019-11-14 PROCEDURE — 88305 TISSUE EXAM BY PATHOLOGIST: CPT | Performed by: FAMILY MEDICINE

## 2019-11-18 LAB — COPATH REPORT: NORMAL

## 2019-11-25 ENCOUNTER — MYC MEDICAL ADVICE (OUTPATIENT)
Dept: FAMILY MEDICINE | Facility: CLINIC | Age: 34
End: 2019-11-25

## 2020-03-11 ENCOUNTER — MYC MEDICAL ADVICE (OUTPATIENT)
Dept: FAMILY MEDICINE | Facility: CLINIC | Age: 35
End: 2020-03-11

## 2020-03-11 DIAGNOSIS — F41.9 ANXIETY: ICD-10-CM

## 2020-03-11 NOTE — TELEPHONE ENCOUNTER
SN,   See below - upcoming travel and increase in anxiety d/t virus news    Controlled Substance Refill Request for Ativan    Last refill: 10/31/2019 #20    Last clinic visit: 6/24/2019 for anxiety     Clinic visit frequency required: not documented in problem list  Next appt: none    Controlled substance agreement on file: No.    Documentation in problem list reviewed:  started, needs to be completed    Processing:  Rx to be electronically transmitted to pharmacy by provider     [place] reviewed:  reviewed- no concerns  [place]:  [email or web address]    Please authorize if appropriate.  Thanks,  Eve NOGUERA RN

## 2020-03-12 RX ORDER — LORAZEPAM 0.5 MG/1
0.5 TABLET ORAL EVERY 8 HOURS PRN
Qty: 20 TABLET | Refills: 0 | Status: SHIPPED | OUTPATIENT
Start: 2020-03-12 | End: 2023-05-31

## 2020-06-19 ENCOUNTER — MYC MEDICAL ADVICE (OUTPATIENT)
Dept: FAMILY MEDICINE | Facility: CLINIC | Age: 35
End: 2020-06-19

## 2020-06-19 DIAGNOSIS — Z30.011 ENCOUNTER FOR INITIAL PRESCRIPTION OF CONTRACEPTIVE PILLS: ICD-10-CM

## 2020-06-19 NOTE — TELEPHONE ENCOUNTER
"Last Written Prescription Date:  6/4/2019  Last Fill Quantity: 90,  # refills: 3   Last office visit: 11/13/2019 with prescribing provider:  Yes, SN - mole  Due for physical - last 6/24/19  Future Office Visit:      Due for visit.  [employer] message sent to patient.  Kimber Bryant RN    Requested Prescriptions   Pending Prescriptions Disp Refills     JUNEL FE 1/20 1-20 MG-MCG tablet [Pharmacy Med Name: JUNEL FE 1 MG-20 MCG TABLET] 28 tablet 0     Sig: TAKE 1 TABLET BY MOUTH EVERY DAY       Contraceptives Protocol Passed - 6/19/2020 12:15 AM        Passed - Patient is not a current smoker if age is 35 or older        Passed - Recent (12 mo) or future (30 days) visit within the authorizing provider's specialty     Patient has had an office visit with the authorizing provider or a provider within the authorizing providers department within the previous 12 mos or has a future within next 30 days. See \"Patient Info\" tab in inbasket, or \"Choose Columns\" in Meds & Orders section of the refill encounter.              Passed - Medication is active on med list        Passed - No active pregnancy on record        Passed - No positive pregnancy test in past 12 months                 "

## 2020-06-22 RX ORDER — NORETHINDRONE ACETATE AND ETHINYL ESTRADIOL AND FERROUS FUMARATE 1MG-20(21)
KIT ORAL
Start: 2020-06-22

## 2020-06-22 NOTE — TELEPHONE ENCOUNTER
Scheduled for video visit with SN on Wed. 7/1 at 10:00  States she has enough of medication left.  Kimber Bryant RN

## 2020-07-01 ENCOUNTER — VIRTUAL VISIT (OUTPATIENT)
Dept: FAMILY MEDICINE | Facility: CLINIC | Age: 35
End: 2020-07-01
Payer: COMMERCIAL

## 2020-07-01 DIAGNOSIS — Z30.41 ENCOUNTER FOR SURVEILLANCE OF CONTRACEPTIVE PILLS: Primary | ICD-10-CM

## 2020-07-01 PROCEDURE — 99213 OFFICE O/P EST LOW 20 MIN: CPT | Mod: 95 | Performed by: FAMILY MEDICINE

## 2020-07-01 RX ORDER — NORETHINDRONE ACETATE AND ETHINYL ESTRADIOL .03; 1.5 MG/1; MG/1
1 TABLET ORAL DAILY
Qty: 90 TABLET | Refills: 3 | Status: SHIPPED | OUTPATIENT
Start: 2020-07-01 | End: 2023-05-31

## 2020-07-01 NOTE — PROGRESS NOTES
"Yris Johnson is a 34 year old female who is being evaluated via a billable video visit.      The patient has been notified of following:     \"This video visit will be conducted via a call between you and your physician/provider. We have found that certain health care needs can be provided without the need for an in-person physical exam.  This service lets us provide the care you need with a video conversation.  If a prescription is necessary we can send it directly to your pharmacy.  If lab work is needed we can place an order for that and you can then stop by our lab to have the test done at a later time.    Video visits are billed at different rates depending on your insurance coverage.  Please reach out to your insurance provider with any questions.    If during the course of the call the physician/provider feels a video visit is not appropriate, you will not be charged for this service.\"    Patient has given verbal consent for Video visit? Yes  How would you like to obtain your AVS? Halima  Patient would like the video invitation sent by: Text to cell phone: [phone]  Will anyone else be joining your video visit? No     Subjective     Yris Johnson is a 34 year old female who presents today via video visit for the following health issues:    HPI  Medication Followup of OCP    Taking Medication as prescribed: yes    Side Effects:  None    Medication Helping Symptoms:  not applicable   Had side effects wht the IUD -acne issues and skin changes.  Since having the IUD removed and starting birth control she has had none of these side effects.  However she does notice some midcycle bleeding and spotting and does not always have her period during the placebo week.  We discussed trying a slightly higher dose pill and see if this helps with the midcycle spotting.  She has no history of migraines with aura or headaches and no has hypertension.    She is due for a repeat Pap and HPV and we can schedule this for the " next month.  Otherwise no other concerns      She does report that since working from home she is exercising much more doing a lot of walking and instead of a car commute she is doing exercise videos instead with this time.  Feels that her overall health is improved    Video Start Time: 10:02        Patient Active Problem List   Diagnosis     CARDIOVASCULAR SCREENING; LDL GOAL LESS THAN 160     Anxiety     GLENNA II (cervical intraepithelial neoplasia II)     Need for Tdap vaccination     History of eating disorder     Encounter for supervision of normal first pregnancy in third trimester     S/P  section     Past Surgical History:   Procedure Laterality Date      SECTION N/A 3/30/2018    Procedure:  SECTION;  Primary  Section ;  Surgeon: Kenia Barbour MD;  Location:  L+D      TOOTH EXTRACTION W/FORCEP         Social History     Tobacco Use     Smoking status: Never Smoker     Smokeless tobacco: Never Used   Substance Use Topics     Alcohol use: Yes     Comment: Once a week     Family History   Problem Relation Age of Onset     Asthma Father      Diabetes Paternal Grandfather      Cerebrovascular Disease Paternal Grandfather      Asthma Sister      Cancer Paternal Aunt      Cancer Paternal Aunt          Current Outpatient Medications   Medication Sig Dispense Refill     MAGNESIUM OXIDE PO        norethindrone-ethinyl estradiol (JUNEL FE ) 1-20 MG-MCG tablet Take 1 tablet by mouth daily 90 tablet 3     norethindrone-ethinyl estradiol (MICROGESTIN ) 1.5-30 MG-MCG tablet Take 1 tablet by mouth daily 90 tablet 3     acetaminophen (TYLENOL) 325 MG tablet Take 2 tablets (650 mg) by mouth every 4 hours as needed for mild pain (Patient not taking: Reported on 2019) 60 tablet 0     ibuprofen (ADVIL/MOTRIN) 600 MG tablet Take 1 tablet (600 mg) by mouth every 6 hours as needed for other (cramping) (Patient not taking: Reported on 2019) 60 tablet 0     LORazepam  "(ATIVAN) 0.5 MG tablet Take 1 tablet (0.5 mg) by mouth every 8 hours as needed for anxiety 20 tablet 0       Reviewed and updated as needed this visit by Provider         Review of Systems   Constitutional, HEENT, cardiovascular, pulmonary, gi and gu systems are negative, except as otherwise noted.      Objective             Physical Exam     GENERAL: Healthy, alert and no distress  EYES: Eyes grossly normal to inspection.  No discharge or erythema, or obvious scleral/conjunctival abnormalities.  RESP: No audible wheeze, cough, or visible cyanosis.  No visible retractions or increased work of breathing.    SKIN: Visible skin clear. No significant rash, abnormal pigmentation or lesions.  NEURO: Cranial nerves grossly intact.  Mentation and speech appropriate for age.  PSYCH: Mentation appears normal, affect normal/bright, judgement and insight intact, normal speech and appearance well-groomed.      Diagnostic Test Results:  Labs reviewed in Epic        Assessment & Plan     1. Encounter for surveillance of contraceptive pills  Sent in a slightly higher dose of combination oral birth control pill discussed she may have more side effects with this but unlikely given the slight increase in dose.  If any problems can resume previous dose of Alesse.  - norethindrone-ethinyl estradiol (MICROGESTIN 1.5/30) 1.5-30 MG-MCG tablet; Take 1 tablet by mouth daily  Dispense: 90 tablet; Refill: 3     BMI:   Estimated body mass index is 33.97 kg/m  as calculated from the following:    Height as of 11/13/19: 1.613 m (5' 3.5\").    Weight as of 11/13/19: 88.4 kg (194 lb 12.8 oz).           See Patient Instructions we will plan to see her in the next month or so for a physical with a Pap which is due      Kenia Witt MD  [facility]      Video-Visit Details    Type of service:  Video Visit    Video End Time:10:10    Originating Location (pt. Location): Home    Distant Location (provider location):  [facility] " CLINIC     Platform used for Video Visit: Rogelio    No follow-ups on file.       Kenia Witt MD

## 2020-08-03 ENCOUNTER — VIRTUAL VISIT (OUTPATIENT)
Dept: FAMILY MEDICINE | Facility: OTHER | Age: 35
End: 2020-08-03

## 2020-08-03 NOTE — PROGRESS NOTES
"Date: 2020 15:31:51  Clinician: Sami Light  Clinician NPI: [id number]  Patient: Yris Johnson  Patient : 1985  Patient Address: [address] [place] [address]  Patient Phone: [phone]  Visit Protocol: URI  Patient Summary:  Yris is a 34 year old ( : 1985 ) female who initiated a Visit for COVID-19 (Coronavirus) evaluation and screening. When asked the question \"Please sign me up to receive news, health information and promotions. \", Yris responded \"No\".    When asked when her symptoms started, Yris reported that she does not have any symptoms.   She denies having recent facial or sinus surgery in the past 60 days and taking antibiotic medication in the past month.    Pertinent COVID-19 (Coronavirus) information  In the past 14 days, Yris has not worked in a congregate living setting.   She does not work or volunteer as healthcare worker or a  and does not work or volunteer in a healthcare facility.   Yris also has not lived in a congregate living setting in the past 14 days. She does not live with a healthcare worker.   Yris has had a close contact with a laboratory-confirmed COVID-19 patient in the last 14 days. 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   Pertinent medical history  Yris does not get yeast infections when she takes antibiotics.   Yris does not need a return to work/school note.   Weight: 180 lbs   Yris does not smoke or use smokeless tobacco.   She denies pregnancy and denies breastfeeding. She has menstruated in the past month.   Additional information as reported by the patient (free text): Very, very occasional dry cough. Temp of 99. No other symptoms.   Weight: 180 lbs    MEDICATIONS: No current medications, ALLERGIES: NKDA  Clinician Response:  Dear Dylan Arndt " "symptoms show that you may have coronavirus (COVID-19). This illness can cause fever, cough and trouble breathing. Many people get a mild case and get better on their own. Some people can get very sick.  What should I do?  We would like to test you for this virus.   1. Please call [phone] to schedule your visit. Explain that you were referred by [facility] to have a COVID-19 test. Be ready to share your [facility] visit ID number.  The following will serve as your written order for this COVID Test, ordered by me, for the indication of suspected COVID [Z20.828]: The test will be ordered in [employer], our electronic health record, after you are scheduled. It will show as ordered and authorized by Roshan Ross MD.  Order: COVID-19 (Coronavirus) PCR for SYMPTOMATIC testing from [facility].      2. When it's time for your COVID test:  Stay at least 6 feet away from others. (If someone will drive you to your test, stay in the backseat, as far away from the  as you can.)   Cover your mouth and nose with a mask, tissue or washcloth.  Go straight to the testing site. Don't make any stops on the way there or back.      3.Starting now: Stay home and away from others (self-isolate) until:   You've had no fever---and no medicine that reduces fever---for 3 full days (72 hours). And...   Your other symptoms have gotten better. For example, your cough or breathing has improved. And...   At least 10 days have passed since your symptoms started.       During this time, don't leave the house except for testing or medical care.   Stay in your own room, even for meals. Use your own bathroom if you can.   Stay away from others in your home. No hugging, kissing or shaking hands. No visitors.  Don't go to work, school or anywhere else.    Clean \"high touch\" surfaces often (doorknobs, counters, handles, etc.). Use a household cleaning spray or wipes. You'll find a full list of  on the EPA website: " www.epa.gov/pesticide-registration/list-n-disinfectants-use-against-sars-cov-2.   Cover your mouth and nose with a mask, tissue or washcloth to avoid spreading germs.  Wash your hands and face often. Use soap and water.  Caregivers in these groups are at risk for severe illness due to COVID-19:  o People 65 years and older  o People who live in a nursing home or long-term care facility  o People with chronic disease (lung, heart, cancer, diabetes, kidney, liver, immunologic)  o People who have a weakened immune system, including those who:   Are in cancer treatment  Take medicine that weakens the immune system, such as corticosteroids  Had a bone marrow or organ transplant  Have an immune deficiency  Have poorly controlled HIV or AIDS  Are obese (body mass index of 40 or higher)  Smoke regularly   o Caregivers should wear gloves while washing dishes, handling laundry and cleaning bedrooms and bathrooms.  o Use caution when washing and drying laundry: Don't shake dirty laundry, and use the warmest water setting that you can.  o For more tips, go to www.cdc.gov/coronavirus/2019-ncov/downloads/10Things.pdf.    4.Sign up for [employer]. We know it's scary to hear that you might have COVID-19. We want to track your symptoms to make sure you're okay over the next 2 weeks. Please look for an email from [employer]---this is a free, online program that we'll use to keep in touch. To sign up, follow the link in the email. Learn more at [email or web address]  How can I take care of myself?   Get lots of rest. Drink extra fluids (unless a doctor has told you not to).   Take Tylenol (acetaminophen) for fever or pain. If you have liver or kidney problems, ask your family doctor if it's okay to take Tylenol.   Adults can take either:    650 mg (two 325 mg pills) every 4 to 6 hours, or...   1,000 mg (two 500 mg pills) every 8 hours as needed.    Note: Don't take more than 3,000 mg in one day. Acetaminophen is found  in many medicines (both prescribed and over-the-counter medicines). Read all labels to be sure you don't take too much.   For children, check the Tylenol bottle for the right dose. The dose is based on the child's age or weight.    If you have other health problems (like cancer, heart failure, an organ transplant or severe kidney disease): Call your specialty clinic if you don't feel better in the next 2 days.       Know when to call 911. Emergency warning signs include:    Trouble breathing or shortness of breath Pain or pressure in the chest that doesn't go away Feeling confused like you haven't felt before, or not being able to wake up Bluish-colored lips or face.  Where can I get more information?    [facility] -- About COVID-19: [email or web address]   CDC -- What to Do If You're Sick: www.cdc.gov/coronavirus/2019-ncov/about/steps-when-sick.html   [facility] -- Ending Home Isolation: www.cdc.gov/coronavirus/2019-ncov/hcp/disposition-in-home-patients.html   CDC -- Caring for Someone: www.cdc.gov/coronavirus/2019-ncov/if-you-are-sick/care-for-someone.html   [facility] -- Interim Guidance for Hospital Discharge to Home: www.[facility].mn.us/diseases/coronavirus/hcp/hospdischarge.pdf   [facility] clinical trials (COVID-19 research studies): clinicalaffairs.[facility]-clinical-trials    Below are the COVID-19 hotlines at the [facility] ([facility]). Interpreters are available.    For health questions: Call 292-040-8357 or 1-772.967.5845 (7 a.m. to 7 p.m.) For questions about schools and childcare: Call 574-064-3518 or 1-610.263.8736 (7 a.m. to 7 p.m.)    Diagnosis: Contact with and (suspected) exposure to other viral communicable diseases  Diagnosis ICD: Z20.828

## 2020-08-04 DIAGNOSIS — Z20.822 ENCOUNTER FOR LABORATORY TESTING FOR COVID-19 VIRUS: Primary | ICD-10-CM

## 2020-08-04 PROCEDURE — U0003 INFECTIOUS AGENT DETECTION BY NUCLEIC ACID (DNA OR RNA); SEVERE ACUTE RESPIRATORY SYNDROME CORONAVIRUS 2 (SARS-COV-2) (CORONAVIRUS DISEASE [COVID-19]), AMPLIFIED PROBE TECHNIQUE, MAKING USE OF HIGH THROUGHPUT TECHNOLOGIES AS DESCRIBED BY CMS-2020-01-R: HCPCS | Performed by: FAMILY MEDICINE

## 2020-08-05 LAB
SARS-COV-2 RNA SPEC QL NAA+PROBE: NOT DETECTED
SPECIMEN SOURCE: NORMAL

## 2020-12-10 ENCOUNTER — VIRTUAL VISIT (OUTPATIENT)
Dept: FAMILY MEDICINE | Facility: CLINIC | Age: 35
End: 2020-12-10
Payer: COMMERCIAL

## 2020-12-10 DIAGNOSIS — F41.9 ANXIETY: Primary | ICD-10-CM

## 2020-12-10 PROCEDURE — 99214 OFFICE O/P EST MOD 30 MIN: CPT | Mod: 95 | Performed by: FAMILY MEDICINE

## 2020-12-10 RX ORDER — ESCITALOPRAM OXALATE 10 MG/1
10 TABLET ORAL DAILY
Qty: 30 TABLET | Refills: 1 | Status: SHIPPED | OUTPATIENT
Start: 2020-12-10 | End: 2021-01-28

## 2020-12-10 ASSESSMENT — ANXIETY QUESTIONNAIRES
2. NOT BEING ABLE TO STOP OR CONTROL WORRYING: SEVERAL DAYS
3. WORRYING TOO MUCH ABOUT DIFFERENT THINGS: NEARLY EVERY DAY
GAD7 TOTAL SCORE: 12
IF YOU CHECKED OFF ANY PROBLEMS ON THIS QUESTIONNAIRE, HOW DIFFICULT HAVE THESE PROBLEMS MADE IT FOR YOU TO DO YOUR WORK, TAKE CARE OF THINGS AT HOME, OR GET ALONG WITH OTHER PEOPLE: NOT DIFFICULT AT ALL
7. FEELING AFRAID AS IF SOMETHING AWFUL MIGHT HAPPEN: SEVERAL DAYS
6. BECOMING EASILY ANNOYED OR IRRITABLE: NEARLY EVERY DAY
5. BEING SO RESTLESS THAT IT IS HARD TO SIT STILL: NOT AT ALL
1. FEELING NERVOUS, ANXIOUS, OR ON EDGE: NEARLY EVERY DAY

## 2020-12-10 ASSESSMENT — PATIENT HEALTH QUESTIONNAIRE - PHQ9
5. POOR APPETITE OR OVEREATING: SEVERAL DAYS
SUM OF ALL RESPONSES TO PHQ QUESTIONS 1-9: 6

## 2020-12-10 NOTE — PROGRESS NOTES
"Yris Johnson is a 35 year old female who is being evaluated via a billable video visit.      The patient has been notified of following:     \"This video visit will be conducted via a call between you and your physician/provider. We have found that certain health care needs can be provided without the need for an in-person physical exam.  This service lets us provide the care you need with a video conversation.  If a prescription is necessary we can send it directly to your pharmacy.  If lab work is needed we can place an order for that and you can then stop by our lab to have the test done at a later time.    Video visits are billed at different rates depending on your insurance coverage.  Please reach out to your insurance provider with any questions.    If during the course of the call the physician/provider feels a video visit is not appropriate, you will not be charged for this service.\"    Patient has given verbal consent for Video visit? Yes  How would you like to obtain your AVS? MyChart  If you are dropped from the video visit, the video invite should be resent to: Send to e-mail at: [email or web address]  Will anyone else be joining your video visit? No     Subjective     Yris Johnson is a 35 year old female who presents today via video visit for the following health issues:    HPI     Anxiety Follow-Up    How are you doing with your anxiety since your last visit? Worsened - having more \"low\" days, feeling more general anxiety than situational      Feels she is in tune with anxiety    Feels like something broke    Feels really low    Does not have panic    Feels both depressed and anxiety    Has worry    Has difficulty waking up at night    Worries about everything    Has dread    Present about few months but really escalating in the last few weeks    Has lorazepam as needed but rarely takes this    Started taking melatonin and this helps with sleep    Is ot working with a therapist  Feels this is " situational   Feels once spring is here she feel it will get better  Is working out  taking vit D really just does not think it is enough      Are you having other symptoms that might be associated with anxiety? No    Have you had a significant life event? No     Are you feeling depressed? Yes:  some episodes    Do you have any concerns with your use of alcohol or other drugs? No    Social History     Tobacco Use     Smoking status: Never Smoker     Smokeless tobacco: Never Used   Substance Use Topics     Alcohol use: Yes     Comment: Once a week     Drug use: No     BROOKE-7 SCORE 3/13/2018 12/10/2020   Total Score 0 12     PHQ 3/13/2018 6/19/2020 12/10/2020   PHQ-9 Total Score 0 0 6   Q9: Thoughts of better off dead/self-harm past 2 weeks Not at all Not at all Not at all           How many servings of fruits and vegetables do you eat daily?  4 or more    On average, how many sweetened beverages do you drink each day (Examples: soda, juice, sweet tea, etc.  Do NOT count diet or artificially sweetened beverages)?   0    How many days per week do you exercise enough to make your heart beat faster? 5    How many minutes a day do you exercise enough to make your heart beat faster? 30 - 60    How many days per week do you miss taking your medication? 0         Video Start Time: 3:02        Review of Systems   Constitutional, HEENT, cardiovascular, pulmonary, gi and gu systems are negative, except as otherwise noted.      Objective           Vitals:  No vitals were obtained today due to virtual visit.    Physical Exam     GENERAL: Healthy, alert and no distress  EYES: Eyes grossly normal to inspection.  No discharge or erythema, or obvious scleral/conjunctival abnormalities.  RESP: No audible wheeze, cough, or visible cyanosis.  No visible retractions or increased work of breathing.    SKIN: Visible skin clear. No significant rash, abnormal pigmentation or lesions.  NEURO: Cranial nerves grossly intact.  Mentation and  speech appropriate for age.  PSYCH: Mentation appears normal, affect normal/bright, judgement and insight intact, normal speech and appearance well-groomed.      No results found for this or any previous visit (from the past 24 hour(s)).        Assessment & Plan     Anxiety  Discussed that she is using many many different tools and reviewed risks and benefits to SSRI medication.  We will have her try 10 mg dose and see how this goes over the next month.  Discussed continuing other tools and if she is any side effects or questions to reach out.  Also discussed possible side effects to watch out for.  - escitalopram (LEXAPRO) 10 MG tablet  Dispense: 30 tablet; Refill: 1        Also encouraged her to see me for a Pap smear she is overdue and is able to stop through my chart.      No follow-ups on file.    Kenia Witt MD  [facility]      Video-Visit Details    Type of service:  Video Visit    Video End Time:3:16    Originating Location (pt. Location): Home    Distant Location (provider location):  [facility]     Platform used for Video Visit: [employer]

## 2020-12-11 ASSESSMENT — ANXIETY QUESTIONNAIRES: GAD7 TOTAL SCORE: 12

## 2020-12-13 ENCOUNTER — HEALTH MAINTENANCE LETTER (OUTPATIENT)
Age: 35
End: 2020-12-13

## 2020-12-13 ENCOUNTER — VIRTUAL VISIT (OUTPATIENT)
Dept: FAMILY MEDICINE | Facility: OTHER | Age: 35
End: 2020-12-13

## 2020-12-13 NOTE — PROGRESS NOTES
"Date: 2020 12:17:14  Clinician: Smiley Galvin  Clinician NPI: [id number]  Patient: Yris Johnson  Patient : 1985  Patient Address: [address] [place] [address]  Patient Phone: [phone]  Visit Protocol: URI  Patient Summary:  Yris is a 35 year old ( : 1985 ) female who initiated a [facility] Visit for COVID-19 (Coronavirus) evaluation and screening. When asked the question \"Please sign me up to receive news, health information and promotions. \", Yris responded \"No\".    Yris states her symptoms started 1-2 days ago.   Her symptoms consist of a headache, rhinitis, and myalgia.   Symptom details     Nasal secretions: The color of her mucus is clear.    Headache: She states the headache is mild (1-3 on a 10 point pain scale).      Yris denies having ear pain, wheezing, fever, cough, nasal congestion, nausea, vomiting, facial pain or pressure, chills, malaise, sore throat, teeth pain, ageusia, diarrhea, and anosmia. She also denies taking antibiotic medication in the past month and having recent facial or sinus surgery in the past 60 days. She is not experiencing dyspnea.   Precipitating events  She has not recently been exposed to someone with influenza. Yris has been in close contact with the following high risk individuals: children under the age of 5.   Pertinent COVID-19 (Coronavirus) information  Yris does not work or volunteer as healthcare worker or a . In the past 14 days, Yris has not worked or volunteered at a healthcare facility or group living setting.   In the past 14 days, she also has not lived in a congregate living setting.   Yris has not had a close contact with a laboratory-confirmed COVID-19 patient within 14 days of symptom onset.    Since 2019, Yris has been tested for COVID-19 and has not had upper respiratory infection or influenza-like illness.      Result of COVID-19 test: Negative     Date of her COVID-19 test: 2020      " Pertinent medical history  She has not been told by her provider to avoid NSAIDs.   Yris does not get yeast infections when she takes antibiotics.   Yris does not have diabetes. She denies having immunosuppressive conditions (e.g., chemotherapy, HIV, organ transplant, long-term use of steroids or other immunosuppressive medications, splenectomy). She does not have severe COPD and congestive heart failure. She does not have asthma.   Yris does not need a return to work/school note.   Weight: 180 lbs   Yris does not smoke or use smokeless tobacco.   She denies pregnancy and denies breastfeeding. She has menstruated in the past month.   Additional information as reported by the patient (free text): I did a saliva test on 12/11 due to a runny nose. It came back negative, but we were just notified today that my daughter was in close contact with someone with a lab confirmed covid diagnosis this past week (last date of exposure 12/8) so I'm hoping for a nasal swab test for higher accuracy and since headaches have started since my last test.   Weight: 180 lbs    MEDICATIONS: Lexapro oral, ALLERGIES: NKDA  Clinician Response:  Dear Yris,    Your symptoms show that you may have coronavirus (COVID-19). This illness can cause fever, cough and trouble breathing. Many people get a mild case and get better on their own. Some people can get very sick.  What should I do?  We would like to test you for this virus.   1. Please call [phone] to schedule your visit. Explain that you were referred by [facility] to have a COVID-19 test. Be ready to share your [facility] visit ID number.  * If you need to schedule in [facility] please call [phone] or for [employer] employees please call [phone].  * If you need to schedule in the [place] area please call [phone]. [employer] employees call [phone].  The following will serve as your written order for this COVID Test, ordered by me, for the indication of suspected COVID  "[Z20.828]: The test will be ordered in [employer], our electronic health record, after you are scheduled. It will show as ordered and authorized by Roshan Ross MD.  Order: COVID-19 (Coronavirus) PCR for SYMPTOMATIC testing from [facility].   2. When it's time for your COVID test:  Stay at least 6 feet away from others. (If someone will drive you to your test, stay in the backseat, as far away from the  as you can.)   Cover your mouth and nose with a mask, tissue or washcloth.  Go straight to the testing site. Don't make any stops on the way there or back.      3.Starting now: Stay home and away from others (self-isolate) until:   You've had no fever---and no medicine that reduces fever---for one full day (24 hours). And...   Your other symptoms have gotten better. For example, your cough or breathing has improved. And...   At least 10 days have passed since your symptoms started.       During this time, don't leave the house except for testing or medical care.   Stay in your own room, even for meals. Use your own bathroom if you can.   Stay away from others in your home. No hugging, kissing or shaking hands. No visitors.  Don't go to work, school or anywhere else.    Clean \"high touch\" surfaces often (doorknobs, counters, handles, etc.). Use a household cleaning spray or wipes. You'll find a full list of  on the EPA website: www.epa.gov/pesticide-registration/list-n-disinfectants-use-against-sars-cov-2.   Cover your mouth and nose with a mask, tissue or washcloth to avoid spreading germs.  Wash your hands and face often. Use soap and water.  Caregivers in these groups are at risk for severe illness due to COVID-19:  o People 65 years and older  o People who live in a nursing home or long-term care facility  o People with chronic disease (lung, heart, cancer, diabetes, kidney, liver, immunologic)  o People who have a weakened immune system, including those who:   Are in cancer treatment  Take medicine that " weakens the immune system, such as corticosteroids  Had a bone marrow or organ transplant  Have an immune deficiency  Have poorly controlled HIV or AIDS  Are obese (body mass index of 40 or higher)  Smoke regularly   o Caregivers should wear gloves while washing dishes, handling laundry and cleaning bedrooms and bathrooms.  o Use caution when washing and drying laundry: Don't shake dirty laundry, and use the warmest water setting that you can.  o For more tips, go to www.cdc.gov/coronavirus/2019-ncov/downloads/10Things.pdf.    4.Sign up for [employer]. We know it's scary to hear that you might have COVID-19. We want to track your symptoms to make sure you're okay over the next 2 weeks. Please look for an email from [employer]---this is a free, online program that we'll use to keep in touch. To sign up, follow the link in the email. Learn more at [email or web address]  How can I take care of myself?   Get lots of rest. Drink extra fluids (unless a doctor has told you not to).   Take Tylenol (acetaminophen) for fever or pain. If you have liver or kidney problems, ask your family doctor if it's okay to take Tylenol.   Adults can take either:    650 mg (two 325 mg pills) every 4 to 6 hours, or...   1,000 mg (two 500 mg pills) every 8 hours as needed.    Note: Don't take more than 3,000 mg in one day. Acetaminophen is found in many medicines (both prescribed and over-the-counter medicines). Read all labels to be sure you don't take too much.   For children, check the Tylenol bottle for the right dose. The dose is based on the child's age or weight.    If you have other health problems (like cancer, heart failure, an organ transplant or severe kidney disease): Call your specialty clinic if you don't feel better in the next 2 days.       Know when to call 911. Emergency warning signs include:    Trouble breathing or shortness of breath Pain or pressure in the chest that doesn't go away Feeling confused like  you haven't felt before, or not being able to wake up Bluish-colored lips or face.  Where can I get more information?   [facility] -- About COVID-19: [email or web address]   CDC -- What to Do If You're Sick: www.cdc.gov/coronavirus/2019-ncov/about/steps-when-sick.html   CDC -- Ending Home Isolation: www.cdc.gov/coronavirus/2019-ncov/hcp/disposition-in-home-patients.html   [facility] -- Caring for Someone: www.cdc.gov/coronavirus/2019-ncov/if-you-are-sick/care-for-someone.html   [facility] -- Interim Guidance for Hospital Discharge to Home: www.[facility].mn.us/diseases/coronavirus/hcp/hospdischarge.pdf   [facility] clinical trials (COVID-19 research studies): clinicalaffairs.[facility]-clinical-trials    Below are the COVID-19 hotlines at the [facility] ([facility]). Interpreters are available.    For health questions: Call 422-009-2286 or 1-515.712.1354 (7 a.m. to 7 p.m.) For questions about schools and childcare: Call 887-479-4059 or 1-348.990.5913 (7 a.m. to 7 p.m.)      Diagnosis: Cough  Diagnosis ICD: R05

## 2020-12-14 ENCOUNTER — APPOINTMENT (OUTPATIENT)
Dept: FAMILY MEDICINE | Facility: CLINIC | Age: 35
End: 2020-12-14
Payer: COMMERCIAL

## 2020-12-14 ENCOUNTER — RESULTS ONLY (OUTPATIENT)
Dept: LAB | Age: 35
End: 2020-12-14

## 2020-12-14 LAB
SARS-COV-2 RNA SPEC QL NAA+PROBE: NORMAL
SPECIMEN SOURCE: NORMAL

## 2020-12-15 LAB
LABORATORY COMMENT REPORT: NORMAL
SARS-COV-2 RNA SPEC QL NAA+PROBE: NEGATIVE
SPECIMEN SOURCE: NORMAL

## 2021-01-22 ENCOUNTER — PATIENT OUTREACH (OUTPATIENT)
Dept: FAMILY MEDICINE | Facility: CLINIC | Age: 36
End: 2021-01-22

## 2021-01-22 NOTE — LETTER
January 22, 2021      Yris Johnson  [address] [address]        Dear ,    This letter is to remind you that you are due for your follow-up Pap smear and Human Papillomavirus (HPV) test.    Please call [phone] to schedule your appointment at your earliest convenience.    If you have completed the appointment outside of the [facility] system, please have the records forwarded to our office. We will update your chart for your provider to review before your next annual wellness visit.     Thank you for choosing [facility]!      Sincerely,    Your [facility] Care Team

## 2021-02-04 ENCOUNTER — OFFICE VISIT (OUTPATIENT)
Dept: FAMILY MEDICINE | Facility: CLINIC | Age: 36
End: 2021-02-04
Payer: COMMERCIAL

## 2021-02-04 VITALS
SYSTOLIC BLOOD PRESSURE: 124 MMHG | HEIGHT: 64 IN | DIASTOLIC BLOOD PRESSURE: 80 MMHG | OXYGEN SATURATION: 97 % | RESPIRATION RATE: 16 BRPM | BODY MASS INDEX: 33.97 KG/M2 | HEART RATE: 105 BPM

## 2021-02-04 DIAGNOSIS — R21 RASH AND NONSPECIFIC SKIN ERUPTION: ICD-10-CM

## 2021-02-04 DIAGNOSIS — Z00.00 ROUTINE GENERAL MEDICAL EXAMINATION AT A HEALTH CARE FACILITY: Primary | ICD-10-CM

## 2021-02-04 DIAGNOSIS — F41.9 ANXIETY: ICD-10-CM

## 2021-02-04 PROCEDURE — 99395 PREV VISIT EST AGE 18-39: CPT | Performed by: FAMILY MEDICINE

## 2021-02-04 PROCEDURE — G0145 SCR C/V CYTO,THINLAYER,RESCR: HCPCS | Performed by: FAMILY MEDICINE

## 2021-02-04 PROCEDURE — 87624 HPV HI-RISK TYP POOLED RSLT: CPT | Performed by: FAMILY MEDICINE

## 2021-02-04 RX ORDER — TRIAMCINOLONE ACETONIDE 1 MG/G
CREAM TOPICAL 2 TIMES DAILY
Qty: 30 G | Refills: 3 | Status: SHIPPED | OUTPATIENT
Start: 2021-02-04

## 2021-02-04 RX ORDER — ESCITALOPRAM OXALATE 10 MG/1
10 TABLET ORAL DAILY
Qty: 90 TABLET | Refills: 3 | Status: SHIPPED | OUTPATIENT
Start: 2021-02-04 | End: 2022-01-24

## 2021-02-04 NOTE — PROGRESS NOTES
SUBJECTIVE:   CC: Yris Johnson is an 35 year old woman who presents for preventive health visit.       Patient has been advised of split billing requirements and indicates understanding: Yes  Healthy Habits:     Getting at least 3 servings of Calcium per day:  Yes    Bi-annual eye exam:  Yes    Dental care twice a year:  Yes    Sleep apnea or symptoms of sleep apnea:  None    Diet:  Gluten-free/reduced    Frequency of exercise:  4-5 days/week    Duration of exercise:  30-45 minutes    Taking medications regularly:  Yes    Medication side effects:  None    PHQ-2 Total Score: 0    Additional concerns today:  Yes    (Z00.00) Routine general medical examination at a health care facility  (primary encounter diagnosis)  Comment:   Plan: Pap imaged thin layer screen with HPV -         recommended age 30 - 65 years (select HPV order        below), HPV High Risk Types DNA Cervical            (F41.9) Anxiety  Comment:   Plan: escitalopram (LEXAPRO) 10 MG tablet            (R21) Rash and nonspecific skin eruption  Comment: Has a rash on the upper eyelids extensor surfaces of the elbows in front of the shins.  It is bilateral itchy and seems to be worse for unknown reasons.  In the past she has thought this was related to gluten but has been gluten-free for the majority of the last year.  Has not tried any topical steroids for this.  Has not tried an elimination diet  Plan: ALLERGY/ASTHMA ADULT REFERRAL, triamcinolone         (KENALOG) 0.1 % external cream                     Today's PHQ-2 Score:   PHQ-2 ( 1999 Pfizer) 2/4/2021   Q1: Little interest or pleasure in doing things 0   Q2: Feeling down, depressed or hopeless 0   PHQ-2 Score 0   Q1: Little interest or pleasure in doing things Not at all   Q2: Feeling down, depressed or hopeless Not at all   PHQ-2 Score 0       Abuse: Current or Past (Physical, Sexual or Emotional) - No  Do you feel safe in your environment? Yes        Social History     Tobacco Use     Smoking  status: Never Smoker     Smokeless tobacco: Never Used   Substance Use Topics     Alcohol use: Yes     Comment: Once a week     If you drink alcohol do you typically have >3 drinks per day or >7 drinks per week? No    Alcohol Use 2021   Prescreen: >3 drinks/day or >7 drinks/week? No   Prescreen: >3 drinks/day or >7 drinks/week? -   No flowsheet data found.    Any new diagnosis of family breast, ovarian, or bowel cancer? Yes     Reviewed orders with patient.  Reviewed health maintenance and updated orders accordingly - Yes      Breast CA Risk Screening:  No flowsheet data found.        Pertinent mammograms are reviewed under the imaging tab.    History of abnormal Pap smear: NO - age 30-65 PAP every 5 years with negative HPV co-testing recommended  PAP / HPV Latest Ref Rng & Units 4/3/2019 5/15/2018 3/17/2016   PAP - NIL NIL NIL   HPV 16 DNA NEG:Negative Negative Negative Negative   HPV 18 DNA NEG:Negative Negative Negative Negative   OTHER HR HPV NEG:Negative Negative Positive(A) Negative     Reviewed and updated as needed this visit by clinical staff  Tobacco  Allergies  Meds  Problems  Med Hx  Surg Hx  Fam Hx          Reviewed and updated as needed this visit by Provider                Past Medical History:   Diagnosis Date     ASCUS favor benign 3/2015    neg HPV, plan cotest in 1 yr.     GLENNA I (cervical intraepithelial neoplasia I) 2008    outside clinic, poss GLENNA II     GLENNA II (cervical intraepithelial neoplasia II) 2008    colp GLENNA 2, [place], [place]     History of eating disorder  to       Past Surgical History:   Procedure Laterality Date      SECTION N/A 3/30/2018    Procedure:  SECTION;  Primary  Section ;  Surgeon: Kenia Barbour MD;  Location:  L+D     HC TOOTH EXTRACTION W/FORCEP         Review of Systems  CONSTITUTIONAL: NEGATIVE for fever, chills, change in weight  INTEGUMENTARU/SKIN: NEGATIVE for worrisome rashes, moles or lesions  EYES:  "NEGATIVE for vision changes or irritation  ENT: NEGATIVE for ear, mouth and throat problems  RESP: NEGATIVE for significant cough or SOB  BREAST: NEGATIVE for masses, tenderness or discharge  CV: NEGATIVE for chest pain, palpitations or peripheral edema  GI: NEGATIVE for nausea, abdominal pain, heartburn, or change in bowel habits  : NEGATIVE for unusual urinary or vaginal symptoms. Periods are regular.  MUSCULOSKELETAL: NEGATIVE for significant arthralgias or myalgia  NEURO: NEGATIVE for weakness, dizziness or paresthesias  PSYCHIATRIC: NEGATIVE for changes in mood or affect     OBJECTIVE:   Pulse 105   Ht 1.613 m (5' 3.5\")   LMP 01/25/2021 (Exact Date)   SpO2 97%   BMI 33.97 kg/m    Physical Exam  GENERAL: healthy, alert and no distress  EYES: Eyes grossly normal to inspection, PERRL and conjunctivae and sclerae normal  HENT: ear canals and TM's normal, nose and mouth without ulcers or lesions  NECK: no adenopathy, no asymmetry, masses, or scars and thyroid normal to palpation  RESP: lungs clear to auscultation - no rales, rhonchi or wheezes  BREAST: normal without masses, tenderness or nipple discharge and no palpable axillary masses or adenopathy  CV: regular rate and rhythm, normal S1 S2, no S3 or S4, no murmur, click or rub, no peripheral edema and peripheral pulses strong  ABDOMEN: soft, nontender, no hepatosplenomegaly, no masses and bowel sounds normal  MS: no gross musculoskeletal defects noted, no edema  SKIN: no suspicious lesions or rashes  NEURO: Normal strength and tone, mentation intact and speech normal  PSYCH: mentation appears normal, affect normal/bright    Diagnostic Test Results:  Labs reviewed in Epic    ASSESSMENT/PLAN:   1. Routine general medical examination at a health care facility    - Pap imaged thin layer screen with HPV - recommended age 30 - 65 years (select HPV order below)  - HPV High Risk Types DNA Cervical    2. Anxiety    - escitalopram (LEXAPRO) 10 MG tablet; Take 1 " "tablet (10 mg) by mouth daily  Dispense: 90 tablet; Refill: 3    3. Rash and nonspecific skin eruption  Will refer to allergy and asthma specialist, may be allergy testing for foods might be indicated.  We also discussed trying elimination diet and review these together.  Can use a sparing amount of steroid cream to the eyelashes and a more moderate amount to the other areas just for a few days as needed  - ALLERGY/ASTHMA ADULT REFERRAL  - triamcinolone (KENALOG) 0.1 % external cream; Apply topically 2 times daily To rash on elbows and legs for 3-5 days.  Can apply sparingly to upper eyelid for 3 days  Dispense: 30 g; Refill: 3    Patient has been advised of split billing requirements and indicates understanding: Yes  COUNSELING:  Reviewed preventive health counseling, as reflected in patient instructions       Regular exercise       Healthy diet/nutrition    Estimated body mass index is 33.97 kg/m  as calculated from the following:    Height as of this encounter: 1.613 m (5' 3.5\").    Weight as of 11/13/19: 88.4 kg (194 lb 12.8 oz).        She reports that she has never smoked. She has never used smokeless tobacco.      Counseling Resources:  ATP IV Guidelines  Pooled Cohorts Equation Calculator  Breast Cancer Risk Calculator  BRCA-Related Cancer Risk Assessment: FHS-7 Tool  FRAX Risk Assessment  ICSI Preventive Guidelines  Dietary Guidelines for Americans, 2010  USDA's MyPlate  ASA Prophylaxis  Lung CA Screening    Kenia Witt MD  [facility]  "

## 2021-02-08 LAB
COPATH REPORT: NORMAL
PAP: NORMAL

## 2021-02-15 ENCOUNTER — PATIENT OUTREACH (OUTPATIENT)
Dept: FAMILY MEDICINE | Facility: CLINIC | Age: 36
End: 2021-02-15

## 2021-02-15 NOTE — TELEPHONE ENCOUNTER
colp GLENNA 2, [place], [place]  Pt had LGSIL on pap 5/08.  Had colposcopy on 6/08 with GLENNA I and some GLENNA II lesions all from another facility and per their notes she was to have repeat Colpo in 6 months but transferred to our clinic and had diagnostic pap on 3/09 instead with ASCUS HPV negative.    Spoke with RUSH Alvarenga and she advised repeat diagnostic pap in 6 months rather than colpo as originally advised by her last PMD.  Most recent diagnostic pap is NIL.  Yearly screens  11/10: NIL pap, 11/11: NIL pap, 12/12: NIL pap.  Plan pap in 1 yr.  12/2013: NIL pap, neg HPV. Plan cotest pap & HPV in 1 year, if both negative could do 3 yrs  3/2015: ASCUS, neg HPV. Plan cotest pap & HPV in 1 year.  3/17/16 pap NIL/neg HPV. Plan: cotest in 1 year.   03/19/18 Pt currently pregnant, discussed at 9/19/17 visit having pap at PP visit (around 5/21/18)  5/15/18 NIL, +HR HPV, not 16/18. Plan 1 yr co-test.  04/3/19: NIL pap, Neg HR HPV result. Plan cotest in 1 year. Results and recommendations released to the pt via [employer]. Pt viewed [employer] message. (sk) CCT tracking  1/22/21 Reminder letter  2/4/2021 NIL pap, neg HR HPV. Plan cotest in 1 year

## 2021-09-26 ENCOUNTER — HEALTH MAINTENANCE LETTER (OUTPATIENT)
Age: 36
End: 2021-09-26

## 2021-10-25 ENCOUNTER — MYC MEDICAL ADVICE (OUTPATIENT)
Dept: FAMILY MEDICINE | Facility: CLINIC | Age: 36
End: 2021-10-25

## 2022-01-18 ENCOUNTER — PATIENT OUTREACH (OUTPATIENT)
Dept: FAMILY MEDICINE | Facility: CLINIC | Age: 37
End: 2022-01-18
Payer: COMMERCIAL

## 2022-03-13 ENCOUNTER — HEALTH MAINTENANCE LETTER (OUTPATIENT)
Age: 37
End: 2022-03-13

## 2022-05-19 ENCOUNTER — TELEPHONE (OUTPATIENT)
Dept: FAMILY MEDICINE | Facility: CLINIC | Age: 37
End: 2022-05-19
Payer: COMMERCIAL

## 2022-05-19 NOTE — TELEPHONE ENCOUNTER
Patient is calling because she has a physical scheduled with Dr. Garcia today at 1:00 pm. She started her period today. She is still wondering if she should come in for her pap smear when she has her period.    Please call patient back. OK to leave a DVM.    Chantell Carl RN

## 2022-05-20 DIAGNOSIS — F41.9 ANXIETY: ICD-10-CM

## 2022-05-20 RX ORDER — ESCITALOPRAM OXALATE 10 MG/1
TABLET ORAL
Qty: 30 TABLET | Refills: 0 | Status: SHIPPED | OUTPATIENT
Start: 2022-05-20 | End: 2022-06-16

## 2022-05-20 NOTE — TELEPHONE ENCOUNTER
One month supply sent 4/26/22.  Due for physical  Last 2/4/21    Has schedule future appointment for physical.    Refill sent for another one month supply.  Kimber Bryant RN

## 2022-06-16 DIAGNOSIS — F41.9 ANXIETY: ICD-10-CM

## 2022-06-16 RX ORDER — ESCITALOPRAM OXALATE 10 MG/1
TABLET ORAL
Qty: 30 TABLET | Refills: 0 | Status: SHIPPED | OUTPATIENT
Start: 2022-06-16 | End: 2022-07-18

## 2022-06-16 NOTE — TELEPHONE ENCOUNTER
One month supply sent 5/20/22  Due for physical.  Cancelled her appointment for physical 5/19.    Future appointment 7/28  Refill sent for one more month supply.    Kimber Bryant RN

## 2022-06-29 NOTE — NURSING NOTE
"Chief Complaint   Patient presents with     Post Partum Exam       Initial /84  Pulse 80  Temp 98  F (36.7  C) (Oral)  Wt 192 lb (87.1 kg)  LMP 2017  Breastfeeding? Yes  BMI 32.96 kg/m2 Estimated body mass index is 32.96 kg/(m^2) as calculated from the following:    Height as of 3/22/18: 5' 4\" (1.626 m).    Weight as of this encounter: 192 lb (87.1 kg).  BP completed using cuff size: regular        The following HM Due: pap smear      The following patient reported/Care Every where data was sent to:  P ABSTRACT QUALITY INITIATIVES [[id number]]  ASTRID Rey           " Per pt's mother would like to be called once MRI has been approved.  Please advise

## 2022-07-28 ENCOUNTER — OFFICE VISIT (OUTPATIENT)
Dept: FAMILY MEDICINE | Facility: CLINIC | Age: 37
End: 2022-07-28
Payer: COMMERCIAL

## 2022-07-28 VITALS
OXYGEN SATURATION: 96 % | SYSTOLIC BLOOD PRESSURE: 124 MMHG | TEMPERATURE: 97.5 F | HEART RATE: 85 BPM | BODY MASS INDEX: 33.79 KG/M2 | HEIGHT: 64 IN | DIASTOLIC BLOOD PRESSURE: 87 MMHG

## 2022-07-28 DIAGNOSIS — R21 RASH: ICD-10-CM

## 2022-07-28 DIAGNOSIS — N87.1 CIN II (CERVICAL INTRAEPITHELIAL NEOPLASIA II): ICD-10-CM

## 2022-07-28 DIAGNOSIS — Z11.59 NEED FOR HEPATITIS C SCREENING TEST: ICD-10-CM

## 2022-07-28 DIAGNOSIS — Z00.00 ROUTINE GENERAL MEDICAL EXAMINATION AT A HEALTH CARE FACILITY: Primary | ICD-10-CM

## 2022-07-28 DIAGNOSIS — Z12.4 CERVICAL CANCER SCREENING: ICD-10-CM

## 2022-07-28 LAB
CHOLEST SERPL-MCNC: 198 MG/DL
FASTING STATUS PATIENT QL REPORTED: NO
FASTING STATUS PATIENT QL REPORTED: NO
GLUCOSE BLD-MCNC: 96 MG/DL (ref 70–99)
HDLC SERPL-MCNC: 52 MG/DL
LDLC SERPL CALC-MCNC: 114 MG/DL
NONHDLC SERPL-MCNC: 146 MG/DL
TRIGL SERPL-MCNC: 161 MG/DL

## 2022-07-28 PROCEDURE — 80061 LIPID PANEL: CPT | Performed by: FAMILY MEDICINE

## 2022-07-28 PROCEDURE — G0145 SCR C/V CYTO,THINLAYER,RESCR: HCPCS | Performed by: FAMILY MEDICINE

## 2022-07-28 PROCEDURE — 36415 COLL VENOUS BLD VENIPUNCTURE: CPT | Performed by: FAMILY MEDICINE

## 2022-07-28 PROCEDURE — 99395 PREV VISIT EST AGE 18-39: CPT | Performed by: FAMILY MEDICINE

## 2022-07-28 PROCEDURE — 87624 HPV HI-RISK TYP POOLED RSLT: CPT | Performed by: FAMILY MEDICINE

## 2022-07-28 PROCEDURE — 82947 ASSAY GLUCOSE BLOOD QUANT: CPT | Performed by: FAMILY MEDICINE

## 2022-07-28 ASSESSMENT — ENCOUNTER SYMPTOMS
HEADACHES: 0
DIARRHEA: 0
ARTHRALGIAS: 0
FEVER: 0
FREQUENCY: 0
WEAKNESS: 0
SORE THROAT: 0
MYALGIAS: 0
EYE PAIN: 0
DIZZINESS: 0
HEARTBURN: 0
CONSTIPATION: 0
PARESTHESIAS: 0
NAUSEA: 0
HEMATURIA: 0
PALPITATIONS: 0
NERVOUS/ANXIOUS: 0
DYSURIA: 0
SHORTNESS OF BREATH: 0
COUGH: 0
BREAST MASS: 0
CHILLS: 0
HEMATOCHEZIA: 0
JOINT SWELLING: 0
ABDOMINAL PAIN: 0

## 2022-07-28 NOTE — PROGRESS NOTES
SUBJECTIVE:   CC: Yris Johnson is an 36 year old woman who presents for preventive health visit.       Patient has been advised of split billing requirements and indicates understanding: Yes  Healthy Habits:     Getting at least 3 servings of Calcium per day:  Yes    Bi-annual eye exam:  Yes    Dental care twice a year:  Yes    Sleep apnea or symptoms of sleep apnea:  None    Diet:  Gluten-free/reduced    Frequency of exercise:  4-5 days/week    Duration of exercise:  30-45 minutes    Taking medications regularly:  Yes    Medication side effects:  None    PHQ-2 Total Score: 0    Additional concerns today:  No          (Z00.00) Routine general medical examination at a health care facility  (primary encounter diagnosis)  Comment:   Plan: Lipid panel reflex to direct LDL Non-fasting,         Glucose            (Z11.59) Need for hepatitis C screening test  Comment:   Plan:     (Z12.4) Cervical cancer screening  Comment:   Plan: Pap Screen with HPV - recommended age 30 - 65         years            (N87.1) GLENNA II (cervical intraepithelial neoplasia II)  Comment:   Plan:     (R21) Rash  Comment: persistent on eyelid and other areas  Plan: Adult Allergy/Asthma Referral             Discuss about discontinuing Lexapro  Today's PHQ-2 Score:   PHQ-2 ( 1999 Pfizer) 7/28/2022   Q1: Little interest or pleasure in doing things 0   Q2: Feeling down, depressed or hopeless 0   PHQ-2 Score 0   PHQ-2 Total Score (12-17 Years)- Positive if 3 or more points; Administer PHQ-A if positive -   Q1: Little interest or pleasure in doing things Not at all   Q2: Feeling down, depressed or hopeless Not at all   PHQ-2 Score 0       Abuse: Current or Past (Physical, Sexual or Emotional) - No  Do you feel safe in your environment? Yes    Have you ever done Advance Care Planning? (For example, a Health Directive, POLST, or a discussion with a medical provider or your loved ones about your wishes): No, advance care planning information given to  patient to review.  Patient declined advance care planning discussion at this time.    Social History     Tobacco Use     Smoking status: Never Smoker     Smokeless tobacco: Never Used   Substance Use Topics     Alcohol use: Yes     Comment: Once a week     If you drink alcohol do you typically have >3 drinks per day or >7 drinks per week? No    Alcohol Use 2022   Prescreen: >3 drinks/day or >7 drinks/week? No   Prescreen: >3 drinks/day or >7 drinks/week? -   No flowsheet data found.    Reviewed orders with patient.  Reviewed health maintenance and updated orders accordingly - Yes  Lab work is in process    Breast Cancer Screening:    Breast CA Risk Assessment (FHS-7) 2022   Do you have a family history of breast, colon, or ovarian cancer? No / Unknown           Pertinent mammograms are reviewed under the imaging tab.    History of abnormal Pap smear:   Last 3 Pap Results:   PAP (no units)   Date Value   2021 NIL   2019 NIL   05/15/2018 NIL     PAP / HPV Latest Ref Rng & Units 2021 4/3/2019 5/15/2018   PAP (Historical) - NIL NIL NIL   HPV16 NEG:Negative Negative Negative Negative   HPV18 NEG:Negative Negative Negative Negative   HRHPV NEG:Negative Negative Negative Positive(A)     Reviewed and updated as needed this visit by clinical staff                    Reviewed and updated as needed this visit by Provider                   Past Medical History:   Diagnosis Date     ASCUS favor benign 3/2015    neg HPV, plan cotest in 1 yr.     GLENNA I (cervical intraepithelial neoplasia I) 2008    outside clinic, poss GLENNA II     GLENNA II (cervical intraepithelial neoplasia II) 2008    colp GLENNA 2, [place], [place]     History of eating disorder  to       Past Surgical History:   Procedure Laterality Date      SECTION N/A 3/30/2018    Procedure:  SECTION;  Primary  Section ;  Surgeon: Kenia Barbour MD;  Location:  L+D      TOOTH EXTRACTION W/FORCEP    "      Review of Systems   Constitutional: Negative for chills and fever.   HENT: Negative for congestion, ear pain, hearing loss and sore throat.    Eyes: Negative for pain and visual disturbance.   Respiratory: Negative for cough and shortness of breath.    Cardiovascular: Negative for chest pain, palpitations and peripheral edema.   Gastrointestinal: Negative for abdominal pain, constipation, diarrhea, heartburn, hematochezia and nausea.   Breasts:  Negative for tenderness, breast mass and discharge.   Genitourinary: Negative for dysuria, frequency, genital sores, hematuria, pelvic pain, urgency, vaginal bleeding and vaginal discharge.   Musculoskeletal: Negative for arthralgias, joint swelling and myalgias.   Skin: Positive for rash.   Neurological: Negative for dizziness, weakness, headaches and paresthesias.   Psychiatric/Behavioral: Negative for mood changes. The patient is not nervous/anxious.           OBJECTIVE:   /87   Pulse 85   Temp 97.5  F (36.4  C) (Temporal)   Ht 1.617 m (5' 3.66\")   LMP 07/14/2022   SpO2 96%   BMI 33.79 kg/m    Physical Exam  GENERAL: healthy, alert and no distress  EYES: Eyes grossly normal to inspection, PERRL and conjunctivae and sclerae normal  HENT: ear canals and TM's normal, nose and mouth without ulcers or lesions  NECK: no adenopathy, no asymmetry, masses, or scars and thyroid normal to palpation  RESP: lungs clear to auscultation - no rales, rhonchi or wheezes  BREAST: normal without masses, tenderness or nipple discharge and no palpable axillary masses or adenopathy  CV: regular rate and rhythm, normal S1 S2, no S3 or S4, no murmur, click or rub, no peripheral edema and peripheral pulses strong  ABDOMEN: soft, nontender, no hepatosplenomegaly, no masses and bowel sounds normal  MS: no gross musculoskeletal defects noted, no edema  SKIN: no suspicious lesions or rashes  NEURO: Normal strength and tone, mentation intact and speech normal  PSYCH: mentation appears " "normal, affect normal/bright  :  Normal    Diagnostic Test Results:  Labs reviewed in Epic    ASSESSMENT/PLAN:   (Z00.00) Routine general medical examination at a health care facility  (primary encounter diagnosis)  Comment:    Plan: Lipid panel reflex to direct LDL Non-fasting,         Glucose             (Z11.59) Need for hepatitis C screening test  Comment:    Plan:      (Z12.4) Cervical cancer screening  Comment:    Plan: Pap Screen with HPV - recommended age 30 - 65         years             (N87.1) GLENNA II (cervical intraepithelial neoplasia II)  Comment:    Plan:      (R21) Rash  Comment:    Plan: Adult Allergy/Asthma Referral               Patient has been advised of split billing requirements and indicates understanding: Yes    COUNSELING:  Reviewed preventive health counseling, as reflected in patient instructions       Regular exercise       Healthy diet/nutrition    Estimated body mass index is 33.97 kg/m  as calculated from the following:    Height as of 2/4/21: 1.613 m (5' 3.5\").    Weight as of 11/13/19: 88.4 kg (194 lb 12.8 oz).    Weight management plan: Discussed healthy diet and exercise guidelines    She reports that she has never smoked. She has never used smokeless tobacco.      Counseling Resources:  ATP IV Guidelines  Pooled Cohorts Equation Calculator  Breast Cancer Risk Calculator  BRCA-Related Cancer Risk Assessment: FHS-7 Tool  FRAX Risk Assessment  ICSI Preventive Guidelines  Dietary Guidelines for Americans, 2010  USDA's MyPlate  ASA Prophylaxis  Lung CA Screening    Kenia Witt MD  [facility]  "

## 2022-08-01 LAB
BKR LAB AP GYN ADEQUACY: NORMAL
BKR LAB AP GYN INTERPRETATION: NORMAL
BKR LAB AP HPV REFLEX: NORMAL
BKR LAB AP PREVIOUS ABNL DX: NORMAL
BKR LAB AP PREVIOUS ABNORMAL: NORMAL
PATH REPORT.COMMENTS IMP SPEC: NORMAL
PATH REPORT.COMMENTS IMP SPEC: NORMAL
PATH REPORT.RELEVANT HX SPEC: NORMAL

## 2022-08-01 NOTE — RESULT ENCOUNTER NOTE
Hello,    Good news - no diabetes.  The cholesterol is a tiny bit high but exercise and healthy diet will help this resolve.  We can recheck this in a year. See what you can do to boost exercise.    Great to see you!    Kenia Witt MD

## 2022-08-03 LAB
HUMAN PAPILLOMA VIRUS 16 DNA: NEGATIVE
HUMAN PAPILLOMA VIRUS 18 DNA: NEGATIVE
HUMAN PAPILLOMA VIRUS FINAL DIAGNOSIS: NORMAL
HUMAN PAPILLOMA VIRUS OTHER HR: NEGATIVE

## 2022-08-05 ENCOUNTER — MYC MEDICAL ADVICE (OUTPATIENT)
Dept: FAMILY MEDICINE | Facility: CLINIC | Age: 37
End: 2022-08-05

## 2022-09-01 ENCOUNTER — OFFICE VISIT (OUTPATIENT)
Dept: ALLERGY | Facility: CLINIC | Age: 37
End: 2022-09-01
Attending: FAMILY MEDICINE
Payer: COMMERCIAL

## 2022-09-01 VITALS — SYSTOLIC BLOOD PRESSURE: 131 MMHG | OXYGEN SATURATION: 100 % | HEART RATE: 95 BPM | DIASTOLIC BLOOD PRESSURE: 86 MMHG

## 2022-09-01 DIAGNOSIS — R19.7 DIARRHEA, UNSPECIFIED TYPE: Primary | ICD-10-CM

## 2022-09-01 DIAGNOSIS — R21 RASH: ICD-10-CM

## 2022-09-01 PROCEDURE — 99203 OFFICE O/P NEW LOW 30 MIN: CPT | Performed by: INTERNAL MEDICINE

## 2022-09-01 RX ORDER — LORATADINE 10 MG/1
TABLET ORAL DAILY
COMMUNITY
End: 2023-05-31

## 2022-09-01 RX ORDER — FLUTICASONE PROPIONATE 50 MCG
SPRAY, SUSPENSION (ML) NASAL DAILY
COMMUNITY

## 2022-09-01 ASSESSMENT — ENCOUNTER SYMPTOMS
CHEST TIGHTNESS: 0
ACTIVITY CHANGE: 0
FACIAL SWELLING: 0
NAUSEA: 0
EYE ITCHING: 0
WHEEZING: 0
ADENOPATHY: 0
JOINT SWELLING: 0
MYALGIAS: 0
CHILLS: 0
DIARRHEA: 0
SHORTNESS OF BREATH: 0
EYE DISCHARGE: 0
VOMITING: 0
RHINORRHEA: 0
EYE REDNESS: 0
COUGH: 0
SINUS PRESSURE: 0
HEADACHES: 0
ARTHRALGIAS: 0
FEVER: 0

## 2022-09-01 NOTE — PATIENT INSTRUCTIONS
Will check labs for celiac.  Will consider the genetic evaluation if negative or see GI.  Allergic rhinitis - Try Zyrtec 10 mg or Allegra 180 mg.  Flonase Sensimist 1-2 sprays each nostril daily as needed.

## 2022-09-01 NOTE — PROGRESS NOTES
Yris Johnson was seen in the Allergy Clinic at [facility].    Yris Johnson is a 37 year old female being seen today at the request of Dr. Kenia Witt in consultation for rash.    Chief Complaint   Patient presents with     Allergy Consult     Patient has been getting a rash on eyelids, tops of feet, elbows and ears when eating gluten since . When cutting out gluten in diet she is fine. She has also been dealing with seasonal allergies since childhood. Symptoms include sneezing, runny nose and cough. She takes Claritin and Flonase PRN and last dose was yesterday.       The rash developed on her elbows and feet, ears as well as eyelids.  Additionally she will have abdominal cramping and diarrhea with gluten ingestion.  She also has sneezing and rhinorrhea.  She has seen dermatology for the rash.  She has photos on her phone.    She does use claritin and flonase for seasonal allergies which works well.  She is wondering if she has celiac disease.    Past Medical History:   Diagnosis Date     ASCUS favor benign 3/2015    neg HPV, plan cotest in 1 yr.     GLENNA I (cervical intraepithelial neoplasia I) 2008    outside clinic, poss GLENNA II     GLENNA II (cervical intraepithelial neoplasia II) 2008    colp GLENNA 2, [place]     History of eating disorder  to      Family History   Problem Relation Age of Onset     Asthma Father      Diabetes Paternal Grandfather      Cerebrovascular Disease Paternal Grandfather      Asthma Sister      Cancer Paternal Aunt      Cancer Paternal Aunt      Past Surgical History:   Procedure Laterality Date      SECTION N/A 3/30/2018    Procedure:  SECTION;  Primary  Section ;  Surgeon: Kenia Barbour MD;  Location:  L+D     HC TOOTH EXTRACTION W/FORCEP         ENVIRONMENTAL HISTORY:   Pets inside the house include None.  Do you smoke cigarettes or other recreational drugs? No There is/are 0 smokers living in the  house. The house does not have a damp basement.     SOCIAL HISTORY:   Yris is employed at [employer]. She lives with her spouse.      Review of Systems   Constitutional: Negative for activity change, chills and fever.   HENT: Negative for congestion, dental problem, ear pain, facial swelling, nosebleeds, postnasal drip, rhinorrhea, sinus pressure and sneezing.    Eyes: Negative for discharge, redness and itching.   Respiratory: Negative for cough, chest tightness, shortness of breath and wheezing.    Cardiovascular: Negative for chest pain.   Gastrointestinal: Negative for diarrhea, nausea and vomiting.   Musculoskeletal: Negative for arthralgias, joint swelling and myalgias.   Skin: Negative for rash.   Neurological: Negative for headaches.   Hematological: Negative for adenopathy.   Psychiatric/Behavioral: Negative for behavioral problems and self-injury.         Current Outpatient Medications:      fluticasone (FLONASE) 50 MCG/ACT nasal spray, Spray into both nostrils daily, Disp: , Rfl:      loratadine (CLARITIN) 10 MG tablet, Take by mouth daily, Disp: , Rfl:      Multiple Vitamins-Minerals (MULTIVITAMIN ADULTS PO), , Disp: , Rfl:      triamcinolone (KENALOG) 0.1 % external cream, Apply topically 2 times daily To rash on elbows and legs for 3-5 days.  Can apply sparingly to upper eyelid for 3 days, Disp: 30 g, Rfl: 3     acetaminophen (TYLENOL) 325 MG tablet, Take 2 tablets (650 mg) by mouth every 4 hours as needed for mild pain (Patient not taking: Reported on 9/1/2022), Disp: 60 tablet, Rfl: 0     escitalopram (LEXAPRO) 10 MG tablet, TAKE 1 TABLET BY MOUTH EVERY DAY, Disp: 90 tablet, Rfl: 0     LORazepam (ATIVAN) 0.5 MG tablet, Take 1 tablet (0.5 mg) by mouth every 8 hours as needed for anxiety (Patient not taking: Reported on 9/1/2022), Disp: 20 tablet, Rfl: 0     MAGNESIUM OXIDE PO, , Disp: , Rfl:      norethindrone-ethinyl estradiol (MICROGESTIN 1.5/30) 1.5-30 MG-MCG tablet, Take 1 tablet by mouth  daily (Patient not taking: Reported on 9/1/2022), Disp: 90 tablet, Rfl: 3  No Known Allergies      EXAM:   /86   Pulse 95   LMP 08/15/2022 (Approximate)   SpO2 100%     Physical Exam    Constitutional:       General: She is not in acute distress.     Appearance: Normal appearance. She is not ill-appearing.   HENT:      Head: Normocephalic and atraumatic.      Nose: Nose normal. No congestion or rhinorrhea.      Mouth/Throat:      Mouth: Mucous membranes are moist.      Pharynx: Oropharynx is clear. No posterior oropharyngeal erythema.   Eyes:      General:         Right eye: No discharge.         Left eye: No discharge.   Cardiovascular:      Rate and Rhythm: Normal rate and regular rhythm.      Heart sounds: Normal heart sounds.   Pulmonary:      Effort: Pulmonary effort is normal.      Breath sounds: Normal breath sounds. No wheezing or rhonchi.   Skin:     General: Skin is warm.      Findings: No erythema or rash.   Neurological:      General: No focal deficit present.      Mental Status: She is alert. Mental status is at baseline.   Psychiatric:         Mood and Affect: Mood normal.         Behavior: Behavior normal.       ASSESSMENT/PLAN:  Yris Johnson is a 37 year old female seen today for food concerns in particular gluten.  When she eats gluten she will have a rash as well as GI symptoms.  She is wondering if she has celiac disease.  She has not had evaluation for this.  [place]    In my opinion it would make sense for us to draw labs for celiac disease.  She did have some recent wheat ingestion.  Typically will need to eat wheat for 2 weeks for testing to become positive.     Do not need to do food testing based on the history.  Pictures appear like dermatitis herpetiformis.    1. Will check labs for celiac.  Will consider the genetic evaluation if negative or see GI.  2. Allergic rhinitis - Try Zyrtec 10 mg or Allegra 180 mg.  Flonase Sensimist  1-2 sprays each nostril daily as needed.    Follow-up as needed.      Thank you for allowing me to participate in the care of Yris Johnson.      A total of 35 minutes was spent on the day of the encounter performing chart review, history and exam, documentation, and counseling and coordination of care as noted above.       Ludin Batista MD  Allergy/Immunology  [facility]

## 2022-09-01 NOTE — LETTER
2022         RE: Yris Johnson  [address] [facility] [address]        Dear Colleague,    Thank you for referring your patient, Yris Johnson, to the [facility]. Please see a copy of my visit note below.    Yris Johnson was seen in the Allergy Clinic at [facility].    Yris Johnson is a 37 year old female being seen today at the request of Dr. Kenia Witt in consultation for rash.    Chief Complaint   Patient presents with     Allergy Consult     Patient has been getting a rash on eyelids, tops of feet, elbows and ears when eating gluten since . When cutting out gluten in diet she is fine. She has also been dealing with seasonal allergies since childhood. Symptoms include sneezing, runny nose and cough. She takes Claritin and Flonase PRN and last dose was yesterday.       The rash developed on her elbows and feet, ears as well as eyelids.  Additionally she will have abdominal cramping and diarrhea with gluten ingestion.  She also has sneezing and rhinorrhea.  She has seen dermatology for the rash.  She has photos on her phone.    She does use claritin and flonase for seasonal allergies which works well.  She is wondering if she has celiac disease.    Past Medical History:   Diagnosis Date     ASCUS favor benign 3/2015    neg HPV, plan cotest in 1 yr.     GLENNA I (cervical intraepithelial neoplasia I) 2008    outside clinic, poss GLENNA II     GLENNA II (cervical intraepithelial neoplasia II) 2008    colp GLENNA 2, [place], [place]     History of eating disorder  to      Family History   Problem Relation Age of Onset     Asthma Father      Diabetes Paternal Grandfather      Cerebrovascular Disease Paternal Grandfather      Asthma Sister      Cancer Paternal Aunt      Cancer Paternal Aunt      Past Surgical History:   Procedure Laterality Date      SECTION N/A 3/30/2018    Procedure:  SECTION;  Primary  Section ;   Surgeon: Kenia Barbour MD;  Location:  L+D     HC TOOTH EXTRACTION W/FORCEP         ENVIRONMENTAL HISTORY:   Pets inside the house include None.  Do you smoke cigarettes or other recreational drugs? No There is/are 0 smokers living in the house. The house does not have a damp basement.     SOCIAL HISTORY:   Yris is employed at [employer]. She lives with her spouse.      Review of Systems   Constitutional: Negative for activity change, chills and fever.   HENT: Negative for congestion, dental problem, ear pain, facial swelling, nosebleeds, postnasal drip, rhinorrhea, sinus pressure and sneezing.    Eyes: Negative for discharge, redness and itching.   Respiratory: Negative for cough, chest tightness, shortness of breath and wheezing.    Cardiovascular: Negative for chest pain.   Gastrointestinal: Negative for diarrhea, nausea and vomiting.   Musculoskeletal: Negative for arthralgias, joint swelling and myalgias.   Skin: Negative for rash.   Neurological: Negative for headaches.   Hematological: Negative for adenopathy.   Psychiatric/Behavioral: Negative for behavioral problems and self-injury.         Current Outpatient Medications:      fluticasone (FLONASE) 50 MCG/ACT nasal spray, Spray into both nostrils daily, Disp: , Rfl:      loratadine (CLARITIN) 10 MG tablet, Take by mouth daily, Disp: , Rfl:      Multiple Vitamins-Minerals (MULTIVITAMIN ADULTS PO), , Disp: , Rfl:      triamcinolone (KENALOG) 0.1 % external cream, Apply topically 2 times daily To rash on elbows and legs for 3-5 days.  Can apply sparingly to upper eyelid for 3 days, Disp: 30 g, Rfl: 3     acetaminophen (TYLENOL) 325 MG tablet, Take 2 tablets (650 mg) by mouth every 4 hours as needed for mild pain (Patient not taking: Reported on 9/1/2022), Disp: 60 tablet, Rfl: 0     escitalopram (LEXAPRO) 10 MG tablet, TAKE 1 TABLET BY MOUTH EVERY DAY, Disp: 90 tablet, Rfl: 0     LORazepam (ATIVAN) 0.5 MG tablet, Take 1 tablet (0.5 mg) by mouth  every 8 hours as needed for anxiety (Patient not taking: Reported on 9/1/2022), Disp: 20 tablet, Rfl: 0     MAGNESIUM OXIDE PO, , Disp: , Rfl:      norethindrone-ethinyl estradiol (MICROGESTIN 1.5/30) 1.5-30 MG-MCG tablet, Take 1 tablet by mouth daily (Patient not taking: Reported on 9/1/2022), Disp: 90 tablet, Rfl: 3  No Known Allergies      EXAM:   /86   Pulse 95   LMP 08/15/2022 (Approximate)   SpO2 100%     Physical Exam    Constitutional:       General: She is not in acute distress.     Appearance: Normal appearance. She is not ill-appearing.   HENT:      Head: Normocephalic and atraumatic.      Nose: Nose normal. No congestion or rhinorrhea.      Mouth/Throat:      Mouth: Mucous membranes are moist.      Pharynx: Oropharynx is clear. No posterior oropharyngeal erythema.   Eyes:      General:         Right eye: No discharge.         Left eye: No discharge.   Cardiovascular:      Rate and Rhythm: Normal rate and regular rhythm.      Heart sounds: Normal heart sounds.   Pulmonary:      Effort: Pulmonary effort is normal.      Breath sounds: Normal breath sounds. No wheezing or rhonchi.   Skin:     General: Skin is warm.      Findings: No erythema or rash.   Neurological:      General: No focal deficit present.      Mental Status: She is alert. Mental status is at baseline.   Psychiatric:         Mood and Affect: Mood normal.         Behavior: Behavior normal.       ASSESSMENT/PLAN:  Yris Johnson is a 37 year old female seen today for food concerns in particular gluten.  When she eats gluten she will have a rash as well as GI symptoms.  She is wondering if she has celiac disease.  She has not had evaluation for this.  She did [place] some [place]    In my opinion it would make sense for us to draw labs for celiac disease.  She did have some recent wheat ingestion.  Typically will need to eat wheat for 2 weeks for testing to become positive.     Do not need to do food  testing based on the history.  Pictures appear like dermatitis herpetiformis.    1. Will check labs for celiac.  Will consider the genetic evaluation if negative or see GI.  2. Allergic rhinitis - Try Zyrtec 10 mg or Allegra 180 mg.  Flonase Sensimist 1-2 sprays each nostril daily as needed.    Follow-up as needed.      Thank you for allowing me to participate in the care of Yris Johnson.      A total of 35 minutes was spent on the day of the encounter performing chart review, history and exam, documentation, and counseling and coordination of care as noted above.       Ludin Batista MD  Allergy/Immunology  [facility]          Again, thank you for allowing me to participate in the care of your patient.        Sincerely,        Ludin Batista MD

## 2022-09-09 ENCOUNTER — LAB (OUTPATIENT)
Dept: LAB | Facility: CLINIC | Age: 37
End: 2022-09-09
Payer: COMMERCIAL

## 2022-09-09 DIAGNOSIS — Z11.59 NEED FOR HEPATITIS C SCREENING TEST: Primary | ICD-10-CM

## 2022-09-09 DIAGNOSIS — R19.7 DIARRHEA, UNSPECIFIED TYPE: ICD-10-CM

## 2022-09-09 DIAGNOSIS — R21 RASH: ICD-10-CM

## 2022-09-09 PROCEDURE — 86231 EMA EACH IG CLASS: CPT | Mod: 90

## 2022-09-09 PROCEDURE — 36415 COLL VENOUS BLD VENIPUNCTURE: CPT

## 2022-09-09 PROCEDURE — 82784 ASSAY IGA/IGD/IGG/IGM EACH: CPT

## 2022-09-09 PROCEDURE — 86258 DGP ANTIBODY EACH IG CLASS: CPT

## 2022-09-09 PROCEDURE — 99000 SPECIMEN HANDLING OFFICE-LAB: CPT

## 2022-09-09 PROCEDURE — 86364 TISS TRNSGLTMNASE EA IG CLAS: CPT

## 2022-09-11 LAB — ENDOMYSIUM IGA TITR SER IF: NORMAL {TITER}

## 2022-09-12 ENCOUNTER — MYC MEDICAL ADVICE (OUTPATIENT)
Dept: ALLERGY | Facility: CLINIC | Age: 37
End: 2022-09-12

## 2022-09-12 LAB
GLIADIN IGA SER-ACNC: 2.7 U/ML
GLIADIN IGG SER-ACNC: <0.6 U/ML
IGA SERPL-MCNC: 246 MG/DL (ref 84–499)
TTG IGA SER-ACNC: 0.7 U/ML
TTG IGG SER-ACNC: 0.9 U/ML

## 2022-09-14 ENCOUNTER — MYC MEDICAL ADVICE (OUTPATIENT)
Dept: FAMILY MEDICINE | Facility: CLINIC | Age: 37
End: 2022-09-14

## 2022-09-14 DIAGNOSIS — L30.9 DERMATITIS: Primary | ICD-10-CM

## 2022-10-07 ENCOUNTER — MYC MEDICAL ADVICE (OUTPATIENT)
Dept: FAMILY MEDICINE | Facility: CLINIC | Age: 37
End: 2022-10-07

## 2022-10-07 ENCOUNTER — LAB (OUTPATIENT)
Dept: LAB | Facility: CLINIC | Age: 37
End: 2022-10-07
Payer: COMMERCIAL

## 2022-10-07 DIAGNOSIS — L30.9 DERMATITIS: ICD-10-CM

## 2022-10-07 DIAGNOSIS — T78.40XA ALLERGIC RASH PRESENT ON EXAMINATION: Primary | ICD-10-CM

## 2022-10-07 PROCEDURE — 99000 SPECIMEN HANDLING OFFICE-LAB: CPT

## 2022-10-07 PROCEDURE — 86364 TISS TRNSGLTMNASE EA IG CLAS: CPT

## 2022-10-07 PROCEDURE — 86231 EMA EACH IG CLASS: CPT | Mod: 90

## 2022-10-07 PROCEDURE — 36415 COLL VENOUS BLD VENIPUNCTURE: CPT

## 2022-10-09 LAB — ENDOMYSIUM IGA TITR SER IF: NORMAL {TITER}

## 2022-10-10 LAB
TTG IGA SER-ACNC: 0.6 U/ML
TTG IGG SER-ACNC: <0.6 U/ML

## 2022-10-18 NOTE — RESULT ENCOUNTER NOTE
Hello,    Great news, your results were normal.  No signs of gluten or celiac disease based on his labs.    Kenia Witt MD

## 2022-12-03 NOTE — PROGRESS NOTES
S:  Yris Johnson is a 32 year old  who is at 9w2d presenting today for ultrasound for viability and dating. The patient was just anxious about the pregnancy and is planning to go on vacation out of the country so wanted to make sure everything was looking well before they left. They plan to leave this . She has not had any bleeding. She reports some very mild cramping here and there but feels like it is probably normal stretching. We discussed normal discomforts of pregnancy. Ultrasound today shows a normal growing IUP which agrees with LMP dating. She is feeling well, just tired. She has only very mild nausea and feels like she is able to eat and drink well. She has her next appointment set for when she returns from vacation. No other questions or concerns today.     O:  /88  Pulse 100  Temp 97.5  F (36.4  C) (Oral)  Wt 175 lb (79.4 kg)  LMP 2017  BMI 30.04 kg/m2  Patient appears well.     A:  Normal early IUP    P:  Follow up with new OB visit as scheduled or PRN.     Jo Garrido CNM    mom states pt. was at the playground and fell forward putting his hands down on the ground to stop his fall and now wont lift his right arm. no deformity noted .- loc

## 2023-03-31 ENCOUNTER — MYC MEDICAL ADVICE (OUTPATIENT)
Dept: FAMILY MEDICINE | Facility: CLINIC | Age: 38
End: 2023-03-31
Payer: COMMERCIAL

## 2023-04-27 ENCOUNTER — TELEPHONE (OUTPATIENT)
Dept: FAMILY MEDICINE | Facility: CLINIC | Age: 38
End: 2023-04-27
Payer: COMMERCIAL

## 2023-04-28 NOTE — TELEPHONE ENCOUNTER
Dr. Carlin's first available is May 30th.  Patient informed.    Appointment scheduled for Tuesday, May 30th, arrive time 9:10 a.m.    Rachell Cruz,

## 2023-04-28 NOTE — TELEPHONE ENCOUNTER
Reason for Call:  Appointment Request    Patient requesting this type of appt: Chronic Diease Management/Medication/Follow-Up    Requested provider: Agnes Carlin MD    Reason patient unable to be scheduled: provider cancelled, patient wishes for sooner appt    When does patient want to be seen/preferred time: 1-2 weeks    Comments: n/a     Could we send this information to you in [facility] or would you prefer to receive a phone call?:   No preference   Okay to leave a detailed message?: Yes at Cell number on file:    Telephone Information:   Mobile [phone]       Call taken on 4/27/2023 at 7:15 PM by Toan xiong

## 2023-05-01 DIAGNOSIS — R21 RASH AND NONSPECIFIC SKIN ERUPTION: Primary | ICD-10-CM

## 2023-05-26 ENCOUNTER — LAB (OUTPATIENT)
Dept: LAB | Facility: CLINIC | Age: 38
End: 2023-05-26
Payer: COMMERCIAL

## 2023-05-26 DIAGNOSIS — R21 RASH AND NONSPECIFIC SKIN ERUPTION: ICD-10-CM

## 2023-05-26 LAB
ALBUMIN SERPL BCG-MCNC: 4.5 G/DL (ref 3.5–5.2)
ALP SERPL-CCNC: 56 U/L (ref 35–104)
ALT SERPL W P-5'-P-CCNC: 20 U/L (ref 10–35)
ANION GAP SERPL CALCULATED.3IONS-SCNC: 12 MMOL/L (ref 7–15)
AST SERPL W P-5'-P-CCNC: 27 U/L (ref 10–35)
BASOPHILS # BLD AUTO: 0 10E3/UL (ref 0–0.2)
BASOPHILS NFR BLD AUTO: 1 %
BILIRUB SERPL-MCNC: 0.5 MG/DL
BUN SERPL-MCNC: 10.6 MG/DL (ref 6–20)
CALCIUM SERPL-MCNC: 9.3 MG/DL (ref 8.6–10)
CHLORIDE SERPL-SCNC: 104 MMOL/L (ref 98–107)
CREAT SERPL-MCNC: 0.82 MG/DL (ref 0.51–0.95)
CRP SERPL-MCNC: <3 MG/L
DEPRECATED HCO3 PLAS-SCNC: 23 MMOL/L (ref 22–29)
EOSINOPHIL # BLD AUTO: 0.1 10E3/UL (ref 0–0.7)
EOSINOPHIL NFR BLD AUTO: 2 %
ERYTHROCYTE [DISTWIDTH] IN BLOOD BY AUTOMATED COUNT: 11.9 % (ref 10–15)
ERYTHROCYTE [SEDIMENTATION RATE] IN BLOOD BY WESTERGREN METHOD: 14 MM/HR (ref 0–20)
GFR SERPL CREATININE-BSD FRML MDRD: >90 ML/MIN/1.73M2
GLUCOSE SERPL-MCNC: 102 MG/DL (ref 70–99)
HCT VFR BLD AUTO: 42 % (ref 35–47)
HGB BLD-MCNC: 14.1 G/DL (ref 11.7–15.7)
IMM GRANULOCYTES # BLD: 0 10E3/UL
IMM GRANULOCYTES NFR BLD: 0 %
LYMPHOCYTES # BLD AUTO: 2.5 10E3/UL (ref 0.8–5.3)
LYMPHOCYTES NFR BLD AUTO: 43 %
MCH RBC QN AUTO: 28.2 PG (ref 26.5–33)
MCHC RBC AUTO-ENTMCNC: 33.6 G/DL (ref 31.5–36.5)
MCV RBC AUTO: 84 FL (ref 78–100)
MONOCYTES # BLD AUTO: 0.7 10E3/UL (ref 0–1.3)
MONOCYTES NFR BLD AUTO: 12 %
NEUTROPHILS # BLD AUTO: 2.5 10E3/UL (ref 1.6–8.3)
NEUTROPHILS NFR BLD AUTO: 43 %
PLATELET # BLD AUTO: 289 10E3/UL (ref 150–450)
POTASSIUM SERPL-SCNC: 4.4 MMOL/L (ref 3.4–5.3)
PROT SERPL-MCNC: 7.5 G/DL (ref 6.4–8.3)
RBC # BLD AUTO: 5 10E6/UL (ref 3.8–5.2)
SODIUM SERPL-SCNC: 139 MMOL/L (ref 136–145)
TSH SERPL DL<=0.005 MIU/L-ACNC: 2.25 UIU/ML (ref 0.3–4.2)
WBC # BLD AUTO: 5.8 10E3/UL (ref 4–11)

## 2023-05-26 PROCEDURE — 86039 ANTINUCLEAR ANTIBODIES (ANA): CPT

## 2023-05-26 PROCEDURE — 86038 ANTINUCLEAR ANTIBODIES: CPT

## 2023-05-26 PROCEDURE — 85652 RBC SED RATE AUTOMATED: CPT

## 2023-05-26 PROCEDURE — 86140 C-REACTIVE PROTEIN: CPT

## 2023-05-26 PROCEDURE — 36415 COLL VENOUS BLD VENIPUNCTURE: CPT

## 2023-05-26 PROCEDURE — 80050 GENERAL HEALTH PANEL: CPT

## 2023-05-26 PROCEDURE — 82306 VITAMIN D 25 HYDROXY: CPT

## 2023-05-29 LAB — DEPRECATED CALCIDIOL+CALCIFEROL SERPL-MC: 37 UG/L (ref 20–75)

## 2023-05-31 DIAGNOSIS — R76.8 ELEVATED ANTINUCLEAR ANTIBODY (ANA) LEVEL: Primary | ICD-10-CM

## 2023-05-31 LAB
ANA PAT SER IF-IMP: ABNORMAL
ANA SER QL IF: POSITIVE
ANA TITR SER IF: ABNORMAL {TITER}

## 2023-06-07 ENCOUNTER — OFFICE VISIT (OUTPATIENT)
Dept: FAMILY MEDICINE | Facility: CLINIC | Age: 38
End: 2023-06-07
Payer: COMMERCIAL

## 2023-06-07 VITALS
RESPIRATION RATE: 18 BRPM | WEIGHT: 212.2 LBS | OXYGEN SATURATION: 98 % | HEART RATE: 86 BPM | DIASTOLIC BLOOD PRESSURE: 62 MMHG | SYSTOLIC BLOOD PRESSURE: 104 MMHG | HEIGHT: 64 IN | TEMPERATURE: 97.4 F | BODY MASS INDEX: 36.23 KG/M2

## 2023-06-07 DIAGNOSIS — Z11.59 NEED FOR HEPATITIS C SCREENING TEST: ICD-10-CM

## 2023-06-07 DIAGNOSIS — Z76.89 ENCOUNTER TO ESTABLISH CARE WITH NEW DOCTOR: Primary | ICD-10-CM

## 2023-06-07 LAB
CHOLEST SERPL-MCNC: 172 MG/DL
HCV AB SERPL QL IA: NONREACTIVE
HDLC SERPL-MCNC: 42 MG/DL
LDLC SERPL CALC-MCNC: 108 MG/DL
NONHDLC SERPL-MCNC: 130 MG/DL
TRIGL SERPL-MCNC: 112 MG/DL

## 2023-06-07 PROCEDURE — 36415 COLL VENOUS BLD VENIPUNCTURE: CPT | Performed by: STUDENT IN AN ORGANIZED HEALTH CARE EDUCATION/TRAINING PROGRAM

## 2023-06-07 PROCEDURE — 99214 OFFICE O/P EST MOD 30 MIN: CPT | Performed by: STUDENT IN AN ORGANIZED HEALTH CARE EDUCATION/TRAINING PROGRAM

## 2023-06-07 PROCEDURE — 80061 LIPID PANEL: CPT | Performed by: STUDENT IN AN ORGANIZED HEALTH CARE EDUCATION/TRAINING PROGRAM

## 2023-06-07 PROCEDURE — 86803 HEPATITIS C AB TEST: CPT | Performed by: STUDENT IN AN ORGANIZED HEALTH CARE EDUCATION/TRAINING PROGRAM

## 2023-06-07 RX ORDER — ALBUTEROL SULFATE 90 UG/1
1-2 AEROSOL, METERED RESPIRATORY (INHALATION)
COMMUNITY
Start: 2022-10-16

## 2023-06-07 NOTE — PROGRESS NOTES
"  Assessment & Plan     (Z76.89) Encounter to establish care with new doctor  (primary encounter diagnosis)  Comment: Chronic, stable.  Paperwork required for living will by medical screening and lab to be obtained today for completion.  Did discuss importance of patient trying to establish care with rheumatology whether through [facility].  Or outside of [facility].  SANKET was discussed with patient and titer noted to be 1:320 which is elevated however further tests have not been completed.  Did encourage patient to continue with food diary as relates to gluten and symptoms to document in order to provide specialist better understanding of what she experiences.  Plan: Lipid panel reflex to direct LDL Fasting,         PRIMARY CARE FOLLOW-UP SCHEDULING            (Z11.59) Need for hepatitis C screening test  Comment: Stable  Plan: Hepatitis C Screen Reflex to HCV RNA Quant and         Genotype                       BMI:   Estimated body mass index is 36.81 kg/m  as calculated from the following:    Height as of this encounter: 1.617 m (5' 3.66\").    Weight as of this encounter: 96.3 kg (212 lb 3.2 oz).   Weight management plan: Discussed healthy diet and exercise guidelines        CHIARA ARGUETA MD  [facility] JÚNIOR Arndt is a 37 year old, presenting for the following health issues:  Establish Care        6/7/2023     7:28 AM   Additional Questions   Roomed by Gracie   Accompanied by none     Forms 6/7/2023   Any forms needing to be completed Yes     History of Present Illness       Reason for visit:  Establish care, talk about blood test results/autoimmune disorders?    She eats 4 or more servings of fruits and vegetables daily.She consumes 0 sweetened beverage(s) daily.She exercises with enough effort to increase her heart rate 30 to 60 minutes per day.  She exercises with enough effort to increase her heart rate 6 days per week.   She is taking medications regularly.     Patient " "reports that she has been noticing changes in her overall health over the last several months.  She endorses being screened for gluten intolerance and seeing multiple specialist as she began to notice rashes over her eyes as well as joint pain and GI upset.  Patient reports that with elimination of gluten she has noticed improvement in her symptoms however has been concerned that there are underlying conditions that are related to her physical manifestations.  She states that she was informed that she is 8 months out to seeing a rheumatologist however is open to seeing the specialist outside of [facility].  Overall she states that she is hopeful to be able to find options regarding treatment and management of her symptoms going forward.            Review of Systems   CONSTITUTIONAL: NEGATIVE for fever, chills, change in weight  INTEGUMENTARY/SKIN: POSITIVE for rash face  ENT/MOUTH: NEGATIVE for ear, mouth and throat problems  RESP: NEGATIVE for significant cough or SOB  CV: NEGATIVE for chest pain, palpitations or peripheral edema  MUSCULOSKELETAL: POSITIVE  for arthralgias diffuse      Objective    /62   Pulse 86   Temp 97.4  F (36.3  C) (Temporal)   Resp 18   Ht 1.617 m (5' 3.66\")   Wt 96.3 kg (212 lb 3.2 oz)   LMP 05/17/2023 (Approximate)   SpO2 98%   BMI 36.81 kg/m    Body mass index is 36.81 kg/m .  Physical Exam   GENERAL: healthy, alert and no distress  EYES: Eyes grossly normal to inspection, PERRL and conjunctivae and sclerae normal  MS: no gross musculoskeletal defects noted, no edema  SKIN: no suspicious lesions or rashes  NEURO: Normal strength and tone, mentation intact and speech normal  PSYCH: mentation appears normal, affect normal/bright    No results found for any visits on 06/07/23.                "

## 2023-06-07 NOTE — PATIENT INSTRUCTIONS
Homework:    Call Rheumatology  -Call insurance to see what rheumatologist are in network   -Check with family for resources (remember you can start care somewhere and release medical records to us)    -FOOD DIARY:  document everything and pictures    - NO GLUTEN AT ALL   **Tumeric   **Garlic   **Marcelo seeds    -Acupuncture- [facility]- Dr. Patel in [facility]

## 2023-08-14 NOTE — MR AVS SNAPSHOT
After Visit Summary   8/9/2017    Yris Johnson    MRN: [id number]           Patient Information     Date Of Birth          1985        Visit Information        Provider Department      8/9/2017 10:30 AM Kenia Witt MD [facility]        Today's Diagnoses     Pregnancy examination or test, pregnancy unconfirmed    -  1      Care Instructions    Ruthie Karla or any of the midwives through [facility] - [phone]    Schedule first OB visit at 10 weeks          Follow-ups after your visit        Who to contact     If you have questions or need follow up information about today's clinic visit or your schedule please contact [facility] directly at [phone].  Normal or non-critical lab and imaging results will be communicated to you by MyChart, letter or phone within 4 business days after the clinic has received the results. If you do not hear from us within 7 days, please contact the clinic through MyChart or phone. If you have a critical or abnormal lab result, we will notify you by phone as soon as possible.  Submit refill requests through [employer] or call your pharmacy and they will forward the refill request to us. Please allow 3 business days for your refill to be completed.          Additional Information About Your Visit        MyChart Information     [employer] gives you secure access to your electronic health record. If you see a primary care provider, you can also send messages to your care team and make appointments. If you have questions, please call your primary care clinic.  If you do not have a primary care provider, please call [phone] and they will assist you.        Care EveryWhere ID     This is your Care EveryWhere ID. This could be used by other organizations to access your [facility] medical records  [id number]        Your Vitals Were     Pulse Temperature Respirations Height Last Period Pulse Oximetry    85 98  F (36.7  " C) (Oral) 18 5' 4\" (1.626 m) 07/03/2017 99%    BMI (Body Mass Index)                   30.55 kg/m2            Blood Pressure from Last 3 Encounters:   08/09/17 132/86   03/17/16 116/88   03/05/15 120/86    Weight from Last 3 Encounters:   08/09/17 178 lb (80.7 kg)   03/17/16 179 lb (81.2 kg)   03/05/15 181 lb (82.1 kg)              We Performed the Following     Beta HCG qual IFA urine        Primary Care Provider Office Phone # Fax #    [place] Witt [phone] [address] [address]        Equal Access to Services     JUAN JOSE MARQUEZ : Elaine traceyo Sojaqueline, waaxda luqadaha, qaybta kaalmada adeegcorey, phani starr . So [facility] [phone].    ATENCIÓN: Si habla español, tiene a schulz disposición servicios gratuitos de asistencia lingüística. Ll[facility] [phone].    We comply with applicable federal civil rights laws and [place] laws. We do not discriminate on the basis of race, color, national origin, age, disability sex, sexual orientation or gender identity.            Thank you!     Thank you for choosing [facility]  for your care. Our goal is always to provide you with excellent care. Hearing back from our patients is one way we can continue to improve our services. Please take a few minutes to complete the written survey that you may receive in the mail after your visit with us. Thank you!             Your Updated Medication List - Protect others around you: Learn how to safely use, store and throw away your medicines at www.disposemymeds.org.          This list is accurate as of: 8/9/17 11:07 AM.  Always use your most recent med list.                   Brand Name Dispense Instructions for use Diagnosis    LORazepam 0.5 MG tablet    ATIVAN    15 tablet    Take 1 tablet (0.5 mg) by mouth every 8 hours as needed for anxiety    Anxiety       MAGNESIUM OXIDE PO           NUVARING 0.12-0.015 MG/24HR vaginal ring "   Generic drug:  etonogestrel-ethinyl estradiol     1 each    INSERT ONE RING VAGINALLY FOR THREE WEEKS AS DIRECTED. REMOVE AFTER ONE WEEK AND THEN REPEAT.    Encounter for surveillance of contraceptive pills       PRENATAL 1 PO           VITAMIN D PO              Bed/Stretcher in lowest position, wheels locked, appropriate side rails in place/Call bell, personal items and telephone in reach/Instruct patient to call for assistance before getting out of bed/chair/stretcher/Non-slip footwear applied when patient is off stretcher/[place] to call system/Physically safe environment - no spills, clutter or unnecessary equipment/Purposeful proactive rounding/Room/bathroom lighting operational, light cord in reach

## 2023-09-24 ENCOUNTER — HEALTH MAINTENANCE LETTER (OUTPATIENT)
Age: 38
End: 2023-09-24

## 2024-04-12 ENCOUNTER — VIRTUAL VISIT (OUTPATIENT)
Dept: FAMILY MEDICINE | Facility: CLINIC | Age: 39
End: 2024-04-12
Payer: COMMERCIAL

## 2024-04-12 DIAGNOSIS — M54.6 CHRONIC BILATERAL THORACIC BACK PAIN: Primary | ICD-10-CM

## 2024-04-12 DIAGNOSIS — G89.29 CHRONIC BILATERAL THORACIC BACK PAIN: Primary | ICD-10-CM

## 2024-04-12 PROCEDURE — G2211 COMPLEX E/M VISIT ADD ON: HCPCS | Mod: 95 | Performed by: STUDENT IN AN ORGANIZED HEALTH CARE EDUCATION/TRAINING PROGRAM

## 2024-04-12 PROCEDURE — 99213 OFFICE O/P EST LOW 20 MIN: CPT | Mod: 95 | Performed by: STUDENT IN AN ORGANIZED HEALTH CARE EDUCATION/TRAINING PROGRAM

## 2024-04-12 NOTE — PROGRESS NOTES
"Yris is a 38 year old who is being evaluated via a billable video visit.    How would you like to obtain your AVS? MyChart  If the video visit is dropped, the invitation should be resent by: Text to cell phone: [phone]  Will anyone else be joining your video visit? No      Assessment & Plan     (M54.6,  G89.29) Chronic bilateral thoracic back pain  (primary encounter diagnosis)  Comment: Chronic, uncontrolled. Pt denied a breast augmentation that was once approved by previous insurance and agreed by several specialists the importance of having the surgery completed for overall quality of life not only for physical improvement but also mental health.  Patient encouraged to follow-up with other specialist regarding the letter and to notify clinic if other things may be necessary for completion.  Plan: Continue to manage    Dictation Disclaimer: Some of this Note has been completed with voice-recognition dictation software. Although errors are generally corrected real-time, there is the potential for a rare error to be present in the completed chart.     The longitudinal plan of care for the diagnosis(es)/condition(s) as documented were addressed during this visit. Due to the added complexity in care, I will continue to support Yris in the subsequent management and with ongoing continuity of care.            BMI  Estimated body mass index is 36.81 kg/m  as calculated from the following:    Height as of 6/7/23: 1.617 m (5' 3.66\").    Weight as of 6/7/23: 96.3 kg (212 lb 3.2 oz).   Weight management plan: Diet and exercise discussed          Subjective   Yris is a 38 year old, presenting for the following health issues:  breast reduction     History of Present Illness       Reason for visit:  Discuss breast reduction denial - BMI    She eats 4 or more servings of fruits and vegetables daily.She consumes 0 sweetened beverage(s) daily.She exercises with enough effort to increase her heart rate 30 to 60 minutes per " day.  She exercises with enough effort to increase her heart rate 6 days per week.   She is taking medications regularly.     Pt reports that when she was at her last job she went in for a consult for a breast reduction in May/June of 2023. She reports having chronic back pain and seeing a chiropractor since 2014. She reports knowledge of having enlarged breasts and when meeting with the plastic surgeon she reports being informed that it would improve her quality of life overall. She states at her last job- [employer] she was approved. She reports changing jobs and insurance she reports having to restart the process for approval. She reports her current insurance is [employer]. She reports that her surgery was denied. 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 She reports having to work hard on her weight and states that the BMI has always been a trigger for her ED. She is hoping for a letter to help appeal this process           ROS: 10 point ROS neg other than the symptoms noted above in the HPI.        Objective           Vitals:  No vitals were obtained today due to virtual visit.    Physical Exam   GENERAL: alert and no distress  EYES: Eyes grossly normal to inspection.  No discharge or erythema, or obvious scleral/conjunctival abnormalities.  RESP: No audible wheeze, cough, or visible cyanosis.    SKIN: Visible skin clear. No significant rash, abnormal pigmentation or lesions.  NEURO: Cranial nerves grossly intact.  Mentation and speech appropriate for age.  PSYCH: Appropriate affect, tone, and pace of words          Video-Visit Details    Type of service:  Video Visit   Originating Location (pt. Location): Home    Distant Location (provider location):  Off-Site  Platform used for Video Visit: Rogelio  Signed Electronically by: CHIARA MASON MD

## 2024-04-16 ENCOUNTER — MYC MEDICAL ADVICE (OUTPATIENT)
Dept: FAMILY MEDICINE | Facility: CLINIC | Age: 39
End: 2024-04-16
Payer: COMMERCIAL

## 2024-06-12 ENCOUNTER — TRANSFERRED RECORDS (OUTPATIENT)
Dept: HEALTH INFORMATION MANAGEMENT | Facility: CLINIC | Age: 39
End: 2024-06-12
Payer: COMMERCIAL

## 2024-08-21 ENCOUNTER — E-VISIT (OUTPATIENT)
Dept: FAMILY MEDICINE | Facility: CLINIC | Age: 39
End: 2024-08-21
Payer: COMMERCIAL

## 2024-08-21 DIAGNOSIS — F41.1 GAD (GENERALIZED ANXIETY DISORDER): Primary | ICD-10-CM

## 2024-08-21 PROBLEM — Z34.03 ENCOUNTER FOR SUPERVISION OF NORMAL FIRST PREGNANCY IN THIRD TRIMESTER: Status: RESOLVED | Noted: 2018-03-19 | Resolved: 2024-08-21

## 2024-08-21 PROBLEM — Z23 NEED FOR TDAP VACCINATION: Status: RESOLVED | Noted: 2017-08-29 | Resolved: 2024-08-21

## 2024-08-21 PROBLEM — Z98.891 S/P CESAREAN SECTION: Status: RESOLVED | Noted: 2018-03-30 | Resolved: 2024-08-21

## 2024-08-21 PROCEDURE — 99421 OL DIG E/M SVC 5-10 MIN: CPT | Performed by: FAMILY MEDICINE

## 2024-08-21 RX ORDER — ESCITALOPRAM OXALATE 10 MG/1
10 TABLET ORAL DAILY
Qty: 90 TABLET | Refills: 1 | Status: SHIPPED | OUTPATIENT
Start: 2024-08-21

## 2024-08-21 ASSESSMENT — ANXIETY QUESTIONNAIRES
4. TROUBLE RELAXING: MORE THAN HALF THE DAYS
GAD7 TOTAL SCORE: 6
2. NOT BEING ABLE TO STOP OR CONTROL WORRYING: NOT AT ALL
6. BECOMING EASILY ANNOYED OR IRRITABLE: MORE THAN HALF THE DAYS
IF YOU CHECKED OFF ANY PROBLEMS ON THIS QUESTIONNAIRE, HOW DIFFICULT HAVE THESE PROBLEMS MADE IT FOR YOU TO DO YOUR WORK, TAKE CARE OF THINGS AT HOME, OR GET ALONG WITH OTHER PEOPLE: SOMEWHAT DIFFICULT
7. FEELING AFRAID AS IF SOMETHING AWFUL MIGHT HAPPEN: NOT AT ALL
GAD7 TOTAL SCORE: 6
3. WORRYING TOO MUCH ABOUT DIFFERENT THINGS: NOT AT ALL
7. FEELING AFRAID AS IF SOMETHING AWFUL MIGHT HAPPEN: NOT AT ALL
8. IF YOU CHECKED OFF ANY PROBLEMS, HOW DIFFICULT HAVE THESE MADE IT FOR YOU TO DO YOUR WORK, TAKE CARE OF THINGS AT HOME, OR GET ALONG WITH OTHER PEOPLE?: SOMEWHAT DIFFICULT
5. BEING SO RESTLESS THAT IT IS HARD TO SIT STILL: NOT AT ALL
1. FEELING NERVOUS, ANXIOUS, OR ON EDGE: MORE THAN HALF THE DAYS
GAD7 TOTAL SCORE: 6

## 2024-08-21 ASSESSMENT — PATIENT HEALTH QUESTIONNAIRE - PHQ9
SUM OF ALL RESPONSES TO PHQ QUESTIONS 1-9: 3
SUM OF ALL RESPONSES TO PHQ QUESTIONS 1-9: 3
10. IF YOU CHECKED OFF ANY PROBLEMS, HOW DIFFICULT HAVE THESE PROBLEMS MADE IT FOR YOU TO DO YOUR WORK, TAKE CARE OF THINGS AT HOME, OR GET ALONG WITH OTHER PEOPLE: NOT DIFFICULT AT ALL

## 2024-08-21 NOTE — PATIENT INSTRUCTIONS
Thank you for choosing us for your care. I have placed an order for your treatment:   Orders Placed This Encounter   Medications     escitalopram (LEXAPRO) 10 MG tablet     Sig: Take 1 tablet (10 mg) by mouth daily.     Dispense:  90 tablet     Refill:  1      View your full visit summary for details by clicking on the link below. Your pharmacist will able to address any questions you may have about the medication.      If you're not feeling better within 4-6 weeks please schedule an appointment.  You can schedule an appointment right here in [employer] [employer], or call [phone]  If the visit is for the same symptoms as your eVisit, we'll refund the cost of your eVisit if seen within seven days.    Generalized Anxiety Disorder: Care Instructions  Overview     We all worry. It's a normal part of life. But when you have generalized anxiety disorder, you worry about lots of things. You have a hard time not worrying. This worry or anxiety interferes with your relationships, work or school, and other areas of your life.  You may worry most days about things like money, health, work, or friends. That may make you feel tired, tense, or cranky. It can make it hard to think. It may get in the way of healthy sleep.  Counseling and medicine can both work to treat anxiety. They are often used together with lifestyle changes, such as getting enough sleep. Treatment can include a type of counseling called cognitive behavioral therapy, or CBT. It helps you notice and replace thoughts that make you worry. You also might have counseling along with those closest to you so that they can help.  Follow-up care is a key part of your treatment and safety. Be sure to make and go to all appointments, and call your doctor if you are having problems. It's also a good idea to know your test results and keep a list of the medicines you take.  How can you care for yourself at home?  Get at least 30 minutes of exercise on most days of the week. Walking  "is a good choice. You also may want to do other activities, such as running, swimming, cycling, or playing tennis or team sports.  Learn and do relaxation exercises, such as deep breathing.  Go to bed at the same time every night. Try for 8 to 10 hours of sleep a night.  Avoid alcohol, marijuana, and illegal drugs.  Find a counselor who uses cognitive behavioral therapy (CBT).  Don't isolate yourself. Let those closest to you help you. Find someone you can trust and confide in. Talk to that person.  Be safe with medicines. Take your medicines exactly as prescribed. Call your doctor if you think you are having a problem with your medicine.  Practice healthy thinking. How you think can affect how you feel and act. Ask yourself if your thoughts are helpful or unhelpful. If they are unhelpful, you can learn how to change them.  Recognize and accept your anxiety. When you feel anxious, say to yourself, \"This is not an emergency. I feel uncomfortable, but I am not in danger. I can keep going even if I feel anxious.\"  When should you call for help?   Call 911  anytime you think you may need emergency care. For example, call if:    You feel you can't stop from hurting yourself or someone else.   Where to get help 24 hours a day, 7 days a week   If you or someone you know talks about suicide, self-harm, a mental health crisis, a substance use crisis, or any other kind of emotional distress, get help right away. You can:    Call the Suicide and Crisis Lifeline at 868.     Call 9-699-357-TALK (1-746.434.4408).     Text HOME to 443742 to access the Crisis Text Line.   Consider saving these numbers in your phone.  Go to [email or web address] for more information or to chat online.  Call your doctor or counselor now or seek immediate medical care if:    You have new anxiety, or your anxiety gets worse.     You have been feeling sad, depressed, or hopeless or have lost interest in things that you usually enjoy.     You do not get better " "as expected.   Where can you learn more?  Go to https://www.healthWikiBrains.net/patiented  Enter G123 in the search box to learn more about \"Generalized Anxiety Disorder: Care Instructions.\"  Current as of: June 24, 2023  Content Version: 14.1 2006-2024 [employer].   Care instructions adapted under license by your healthcare professional. If you have questions about a medical condition or this instruction, always ask your healthcare professional. [employer], [employer] disclaims any warranty or liability for your use of this information.    "

## 2024-08-22 ASSESSMENT — PATIENT HEALTH QUESTIONNAIRE - PHQ9: SUM OF ALL RESPONSES TO PHQ QUESTIONS 1-9: 3

## 2024-09-11 ENCOUNTER — MYC MEDICAL ADVICE (OUTPATIENT)
Dept: FAMILY MEDICINE | Facility: CLINIC | Age: 39
End: 2024-09-11
Payer: COMMERCIAL

## 2024-09-11 DIAGNOSIS — Z01.89 ROUTINE LAB DRAW: Primary | ICD-10-CM

## 2024-09-16 ENCOUNTER — LAB (OUTPATIENT)
Dept: LAB | Facility: CLINIC | Age: 39
End: 2024-09-16
Payer: COMMERCIAL

## 2024-09-16 DIAGNOSIS — Z01.89 ROUTINE LAB DRAW: ICD-10-CM

## 2024-09-16 LAB
ALBUMIN SERPL BCG-MCNC: 4.3 G/DL (ref 3.5–5.2)
ALP SERPL-CCNC: 60 U/L (ref 40–150)
ALT SERPL W P-5'-P-CCNC: 14 U/L (ref 0–50)
ANION GAP SERPL CALCULATED.3IONS-SCNC: 10 MMOL/L (ref 7–15)
AST SERPL W P-5'-P-CCNC: 22 U/L (ref 0–45)
BASOPHILS # BLD AUTO: 0 10E3/UL (ref 0–0.2)
BASOPHILS NFR BLD AUTO: 1 %
BILIRUB SERPL-MCNC: 0.4 MG/DL
BUN SERPL-MCNC: 11.2 MG/DL (ref 6–20)
CALCIUM SERPL-MCNC: 9 MG/DL (ref 8.8–10.4)
CHLORIDE SERPL-SCNC: 106 MMOL/L (ref 98–107)
CHOLEST SERPL-MCNC: 159 MG/DL
CREAT SERPL-MCNC: 0.84 MG/DL (ref 0.51–0.95)
EGFRCR SERPLBLD CKD-EPI 2021: >90 ML/MIN/1.73M2
EOSINOPHIL # BLD AUTO: 0.1 10E3/UL (ref 0–0.7)
EOSINOPHIL NFR BLD AUTO: 2 %
ERYTHROCYTE [DISTWIDTH] IN BLOOD BY AUTOMATED COUNT: 11.9 % (ref 10–15)
FASTING STATUS PATIENT QL REPORTED: YES
FASTING STATUS PATIENT QL REPORTED: YES
GLUCOSE SERPL-MCNC: 102 MG/DL (ref 70–99)
HCO3 SERPL-SCNC: 24 MMOL/L (ref 22–29)
HCT VFR BLD AUTO: 42.5 % (ref 35–47)
HDLC SERPL-MCNC: 44 MG/DL
HGB BLD-MCNC: 14.8 G/DL (ref 11.7–15.7)
IMM GRANULOCYTES # BLD: 0 10E3/UL
IMM GRANULOCYTES NFR BLD: 0 %
LDLC SERPL CALC-MCNC: 98 MG/DL
LYMPHOCYTES # BLD AUTO: 2.5 10E3/UL (ref 0.8–5.3)
LYMPHOCYTES NFR BLD AUTO: 42 %
MCH RBC QN AUTO: 29.8 PG (ref 26.5–33)
MCHC RBC AUTO-ENTMCNC: 34.8 G/DL (ref 31.5–36.5)
MCV RBC AUTO: 86 FL (ref 78–100)
MONOCYTES # BLD AUTO: 0.7 10E3/UL (ref 0–1.3)
MONOCYTES NFR BLD AUTO: 12 %
NEUTROPHILS # BLD AUTO: 2.7 10E3/UL (ref 1.6–8.3)
NEUTROPHILS NFR BLD AUTO: 44 %
NONHDLC SERPL-MCNC: 115 MG/DL
PLATELET # BLD AUTO: 272 10E3/UL (ref 150–450)
POTASSIUM SERPL-SCNC: 4.3 MMOL/L (ref 3.4–5.3)
PROT SERPL-MCNC: 7.4 G/DL (ref 6.4–8.3)
RBC # BLD AUTO: 4.96 10E6/UL (ref 3.8–5.2)
SODIUM SERPL-SCNC: 140 MMOL/L (ref 135–145)
TRIGL SERPL-MCNC: 84 MG/DL
TSH SERPL DL<=0.005 MIU/L-ACNC: 2.53 UIU/ML (ref 0.3–4.2)
WBC # BLD AUTO: 6.1 10E3/UL (ref 4–11)

## 2024-09-16 PROCEDURE — 84443 ASSAY THYROID STIM HORMONE: CPT

## 2024-09-16 PROCEDURE — 36415 COLL VENOUS BLD VENIPUNCTURE: CPT

## 2024-09-16 PROCEDURE — 80053 COMPREHEN METABOLIC PANEL: CPT

## 2024-09-16 PROCEDURE — 80061 LIPID PANEL: CPT

## 2024-09-16 PROCEDURE — 85025 COMPLETE CBC W/AUTO DIFF WBC: CPT

## 2024-09-17 ENCOUNTER — VIRTUAL VISIT (OUTPATIENT)
Dept: FAMILY MEDICINE | Facility: CLINIC | Age: 39
End: 2024-09-17
Payer: COMMERCIAL

## 2024-09-17 DIAGNOSIS — Z56.6 STRESS AT WORK: ICD-10-CM

## 2024-09-17 DIAGNOSIS — F41.1 GAD (GENERALIZED ANXIETY DISORDER): Primary | ICD-10-CM

## 2024-09-17 PROCEDURE — G2211 COMPLEX E/M VISIT ADD ON: HCPCS | Mod: 95 | Performed by: STUDENT IN AN ORGANIZED HEALTH CARE EDUCATION/TRAINING PROGRAM

## 2024-09-17 PROCEDURE — 99214 OFFICE O/P EST MOD 30 MIN: CPT | Mod: 95 | Performed by: STUDENT IN AN ORGANIZED HEALTH CARE EDUCATION/TRAINING PROGRAM

## 2024-09-17 SDOH — HEALTH STABILITY - MENTAL HEALTH: OTHER PHYSICAL AND MENTAL STRAIN RELATED TO WORK: Z56.6

## 2024-09-17 ASSESSMENT — ANXIETY QUESTIONNAIRES
8. IF YOU CHECKED OFF ANY PROBLEMS, HOW DIFFICULT HAVE THESE MADE IT FOR YOU TO DO YOUR WORK, TAKE CARE OF THINGS AT HOME, OR GET ALONG WITH OTHER PEOPLE?: NOT DIFFICULT AT ALL
7. FEELING AFRAID AS IF SOMETHING AWFUL MIGHT HAPPEN: NOT AT ALL
GAD7 TOTAL SCORE: 3

## 2024-09-17 NOTE — PROGRESS NOTES
Yris is a 39 year old who is being evaluated via a billable video visit.          Assessment & Plan     (F41.1) BROOKE (generalized anxiety disorder)  (primary encounter diagnosis)  Comment: Chronic,  uncontrolled. Pt restarted lexapro at 10 mg and has been tolerating medication  Plan: Will continue at dose and recommended to follow up in 1-2 months    (Z56.6) Stress at work  Comment: Chronic, uncontrolled.   Plan: Did discuss how aspects of stress can relate to menstrual cycle changes. Did recommend pt to continue to track cycle. Pt to meet with  for new job opportunities. Continue to work on work-life balance     Dictation Disclaimer: Some of this Note has been completed with voice-recognition dictation software. Although errors are generally corrected real-time, there is the potential for a rare error to be present in the completed chart.     The longitudinal plan of care for the diagnosis(es)/condition(s) as documented were addressed during this visit. Due to the added complexity in care, I will continue to support Yris in the subsequent management and with ongoing continuity of care.                  Subjective   Yris is a 39 year old, presenting for the following health issues:  No chief complaint on file.    History of Present Illness       Mental Health Follow-up:  Patient presents to follow-up on Depression & Anxiety.Patient's depression since last visit has been:  Better  The patient is not having other symptoms associated with depression.  Patient's anxiety since last visit has been:  Better  The patient is not having other symptoms associated with anxiety.  Any significant life events: No  Patient is feeling anxious or having panic attacks.  Patient has no concerns about alcohol or drug use.    She eats 4 or more servings of fruits and vegetables daily.She consumes 1 sweetened beverage(s) daily.She exercises with enough effort to increase her heart rate 30 to 60 minutes per day.  She exercises  with enough effort to increase her heart rate 5 days per week.   She is taking medications regularly.     Patient reports that she restarted her Lexapro due to more increase in stress due to work changes    She reports she hasn't seen a huge change in her  mental health however wants to continue    She endorses knowing that she has a spectrum of gluten sensitivity she reports noticing a breakout when she does not stay on a strict diet. She has noticed hives on her face.    She reports that she has also noticed changes in  her period. She reports having every 3 weeks to spotty periods. She is concerned she is perimenopausal.          ROS: 10 point ROS neg other than the symptoms noted above in the HPI.        Objective           Vitals:  No vitals were obtained today due to virtual visit.    Physical Exam   GENERAL: alert and no distress  EYES: Eyes grossly normal to inspection.  No discharge or erythema, or obvious scleral/conjunctival abnormalities.  RESP: No audible wheeze, cough, or visible cyanosis.    SKIN: Visible skin clear. No significant rash, abnormal pigmentation or lesions.  NEURO: Cranial nerves grossly intact.  Mentation and speech appropriate for age.  PSYCH: Appropriate affect, tone, and pace of words          Video-Visit Details    Type of service:  Video Visit   Originating Location (pt. Location): Home    Distant Location (provider location):  On-site  Platform used for Video Visit: Rogelio  Signed Electronically by: CHIARA MASON MD

## 2024-11-17 ENCOUNTER — HEALTH MAINTENANCE LETTER (OUTPATIENT)
Age: 39
End: 2024-11-17

## 2024-11-25 NOTE — MR AVS SNAPSHOT
After Visit Summary   3/22/2018    Yris Johnson    MRN: [id number]           Patient Information     Date Of Birth          1985        Visit Information        Provider Department      3/22/2018 1:00 PM Agnes Zhang APRN [facility]        Today's Diagnoses     Encounter for supervision of normal first pregnancy in third trimester    -  1       Follow-ups after your visit        Your next 10 appointments already scheduled     Mar 27, 2018  3:15 PM CDT   ESTABLISHED PRENATAL with Kenia Barbour MD   [facility] [place]    [address] [place] [id number] [phone]            Mar 30, 2018   Procedure with Kenia Barbour MD   [facility] (--)    [address] [place] [address] Ave  [facility] [place] [address] [phone]              Who to contact     If you have questions or need follow up information about today's clinic visit or your schedule please contact [facility] directly at [phone].  Normal or non-critical lab and imaging results will be communicated to you by [employer], letter or phone within 4 business days after the clinic has received the results. If you do not hear from us within 7 days, please contact the clinic through [facility] ([facility]t or phone. If you have a critical or abnormal lab result, we will notify you by phone as soon as possible.  Submit refill requests through [employer] or call your pharmacy and they will forward the refill request to us. Please allow 3 business days for your refill to be completed.          Additional Information About Your Visit        [employer] Information     [employer] gives you secure access to your electronic health record. If you see a primary care provider, you can also send messages to your care team and make appointments. If you have questions, please call your primary care clinic.  If you do not have a primary care provider, please call [phone] and they  "will assist you.        Care EveryWhere ID     This is your Care EveryWhere ID. This could be used by other organizations to access your [facility] medical records  [id number]        Your Vitals Were     Pulse Height Last Period Pulse Oximetry BMI (Body Mass Index)       114 5' 4\" (1.626 m) 07/03/2017 99% 38.62 kg/m2        Blood Pressure from Last 3 Encounters:   03/22/18 138/88   03/19/18 (!) 132/92   03/13/18 124/89    Weight from Last 3 Encounters:   03/22/18 225 lb (102.1 kg)   03/19/18 223 lb 9.6 oz (101.4 kg)   03/13/18 221 lb 3.2 oz (100.3 kg)              Today, you had the following     No orders found for display       Primary Care Provider Office Phone # Fax #    Kenia Witt [phone] [address] [address]        Equal Access to Services     [facility] : Hadii aad ku hadasho Soomaali, waaxda luqadaha, qaybta kaalmada adeegyada, waxay freddiein haysierra starr . So [facility] [phone].    ATENCIÓN: Si habla español, tiene a schulz disposición servicios gratuitos de asistencia lingüística. Ll[facility] [phone].    We comply with applicable federal civil rights laws and [place] laws. We do not discriminate on the basis of race, color, national origin, age, disability, sex, sexual orientation, or gender identity.            Thank you!     Thank you for choosing [facility]  for your care. Our goal is always to provide you with excellent care. Hearing back from our patients is one way we can continue to improve our services. Please take a few minutes to complete the written survey that you may receive in the mail after your visit with us. Thank you!             Your Updated Medication List - Protect others around you: Learn how to safely use, store and throw away your medicines at www.disposemymeds.org.          This list is accurate as of 3/22/18  1:46 PM.  Always use your most recent med list.                   Brand Name " Dispense Instructions for use Diagnosis    MAGNESIUM OXIDE PO           PRENATAL 1 PO              06-Jun-2024 02:19 no

## 2025-02-11 DIAGNOSIS — F41.1 GAD (GENERALIZED ANXIETY DISORDER): ICD-10-CM

## 2025-02-11 RX ORDER — ESCITALOPRAM OXALATE 10 MG/1
10 TABLET ORAL DAILY
Qty: 90 TABLET | Refills: 1 | Status: SHIPPED | OUTPATIENT
Start: 2025-02-11

## 2025-07-28 ENCOUNTER — MYC MEDICAL ADVICE (OUTPATIENT)
Dept: FAMILY MEDICINE | Facility: CLINIC | Age: 40
End: 2025-07-28
Payer: COMMERCIAL

## 2025-07-28 DIAGNOSIS — Z13.29 SCREENING FOR THYROID DISORDER: ICD-10-CM

## 2025-07-28 DIAGNOSIS — Z13.6 CARDIOVASCULAR SCREENING; LDL GOAL LESS THAN 100: ICD-10-CM

## 2025-07-30 ENCOUNTER — LAB (OUTPATIENT)
Dept: LAB | Facility: CLINIC | Age: 40
End: 2025-07-30
Payer: COMMERCIAL

## 2025-07-30 DIAGNOSIS — Z13.6 CARDIOVASCULAR SCREENING; LDL GOAL LESS THAN 100: ICD-10-CM

## 2025-07-30 DIAGNOSIS — Z13.29 SCREENING FOR THYROID DISORDER: ICD-10-CM

## 2025-07-30 LAB
ALBUMIN SERPL BCG-MCNC: 4.1 G/DL (ref 3.5–5.2)
ALP SERPL-CCNC: 53 U/L (ref 40–150)
ALT SERPL W P-5'-P-CCNC: 24 U/L (ref 0–50)
ANION GAP SERPL CALCULATED.3IONS-SCNC: 10 MMOL/L (ref 7–15)
AST SERPL W P-5'-P-CCNC: 31 U/L (ref 0–45)
BILIRUB SERPL-MCNC: 0.3 MG/DL
BUN SERPL-MCNC: 14.8 MG/DL (ref 6–20)
CALCIUM SERPL-MCNC: 8.9 MG/DL (ref 8.8–10.4)
CHLORIDE SERPL-SCNC: 104 MMOL/L (ref 98–107)
CHOLEST SERPL-MCNC: 180 MG/DL
CREAT SERPL-MCNC: 0.9 MG/DL (ref 0.51–0.95)
EGFRCR SERPLBLD CKD-EPI 2021: 84 ML/MIN/1.73M2
FASTING STATUS PATIENT QL REPORTED: YES
FASTING STATUS PATIENT QL REPORTED: YES
GLUCOSE SERPL-MCNC: 94 MG/DL (ref 70–99)
HCO3 SERPL-SCNC: 23 MMOL/L (ref 22–29)
HDLC SERPL-MCNC: 45 MG/DL
LDLC SERPL CALC-MCNC: 107 MG/DL
NONHDLC SERPL-MCNC: 135 MG/DL
POTASSIUM SERPL-SCNC: 4.5 MMOL/L (ref 3.4–5.3)
PROT SERPL-MCNC: 7.2 G/DL (ref 6.4–8.3)
SODIUM SERPL-SCNC: 137 MMOL/L (ref 135–145)
TRIGL SERPL-MCNC: 138 MG/DL
TSH SERPL DL<=0.005 MIU/L-ACNC: 1.99 UIU/ML (ref 0.3–4.2)

## 2025-07-30 PROCEDURE — 36415 COLL VENOUS BLD VENIPUNCTURE: CPT

## 2025-07-30 PROCEDURE — 84443 ASSAY THYROID STIM HORMONE: CPT

## 2025-07-30 PROCEDURE — 80053 COMPREHEN METABOLIC PANEL: CPT

## 2025-07-30 PROCEDURE — 80061 LIPID PANEL: CPT

## 2025-07-30 SDOH — HEALTH STABILITY: PHYSICAL HEALTH: ON AVERAGE, HOW MANY MINUTES DO YOU ENGAGE IN EXERCISE AT THIS LEVEL?: 30 MIN

## 2025-07-30 SDOH — HEALTH STABILITY: PHYSICAL HEALTH: ON AVERAGE, HOW MANY DAYS PER WEEK DO YOU ENGAGE IN MODERATE TO STRENUOUS EXERCISE (LIKE A BRISK WALK)?: 7 DAYS

## 2025-07-30 ASSESSMENT — SOCIAL DETERMINANTS OF HEALTH (SDOH): HOW OFTEN DO YOU GET TOGETHER WITH FRIENDS OR RELATIVES?: THREE TIMES A WEEK

## 2025-07-31 ENCOUNTER — OFFICE VISIT (OUTPATIENT)
Dept: FAMILY MEDICINE | Facility: CLINIC | Age: 40
End: 2025-07-31
Payer: COMMERCIAL

## 2025-07-31 VITALS
DIASTOLIC BLOOD PRESSURE: 76 MMHG | RESPIRATION RATE: 18 BRPM | WEIGHT: 224.8 LBS | OXYGEN SATURATION: 97 % | TEMPERATURE: 98.6 F | SYSTOLIC BLOOD PRESSURE: 132 MMHG | BODY MASS INDEX: 38.38 KG/M2 | HEIGHT: 64 IN | HEART RATE: 84 BPM

## 2025-07-31 DIAGNOSIS — F41.1 GAD (GENERALIZED ANXIETY DISORDER): ICD-10-CM

## 2025-07-31 DIAGNOSIS — Z23 NEED FOR VACCINATION: ICD-10-CM

## 2025-07-31 DIAGNOSIS — Z00.00 ROUTINE GENERAL MEDICAL EXAMINATION AT A HEALTH CARE FACILITY: Primary | ICD-10-CM

## 2025-07-31 RX ORDER — ESCITALOPRAM OXALATE 10 MG/1
10 TABLET ORAL DAILY
Qty: 90 TABLET | Refills: 3 | COMMUNITY
Start: 2025-07-31

## 2025-07-31 NOTE — PROGRESS NOTES
"Preventive Care Visit  [facility]  CHIARA MASON MD, Family Medicine  Jul 31, 2025      Assessment & Plan     (Z00.00) Routine general medical examination at a health care facility  (primary encounter diagnosis)  Comment: Stable  Plan: REVIEW OF HEALTH MAINTENANCE PROTOCOL ORDERS            (Z23) Need for vaccination  Comment: Stable  Plan: REVIEW OF HEALTH MAINTENANCE PROTOCOL ORDERS,         HEPATITIS B, ADULT 20+ (ENGERIX-B/RECOMBIVAX         HB)            (F41.1) BROOKE (generalized anxiety disorder)  Comment: Chronic, stable  Plan: escitalopram (LEXAPRO) 10 MG tablet            I am utilizing AI dictation through [employer] to document the patient's history and physical examination. Please note that while the AI is designed to assist in capturing detailed information, there may be errors in the dictation. I will review and edit the content for accuracy before finalizing.      The longitudinal plan of care for the diagnosis(es)/condition(s) as documented were addressed during this visit. Due to the added complexity in care, I will continue to support Yris in the subsequent management and with ongoing continuity of care.        BMI  Estimated body mass index is 38.85 kg/m  as calculated from the following:    Height as of this encounter: 1.62 m (5' 3.78\").    Weight as of this encounter: 102 kg (224 lb 12.8 oz).   Weight management plan: not discussed    Counseling  Appropriate preventive services were addressed with this patient via screening, questionnaire, or discussion as appropriate for fall prevention, nutrition, physical activity, Tobacco-use cessation, social engagement, weight loss and cognition.  Checklist reviewing preventive services available has been given to the patient.  Reviewed patient's diet, addressing concerns and/or questions.           Carolina Arndt is a 39 year old, presenting for the following:  Physical           HPI  History of Present Illness    Yris " "HILDA Johnson, 39 years    Pap smear screening  Reported awareness of not needing a pap smear at this visit, as three consecutive normal pap smears have been completed previously. Last pap smear was normal. Next pap smear scheduled for 2027.    HPV vaccination  Uncertain about HPV vaccination history. Recalled having one valid dose and one invalid dose in the past. Declined further HPV vaccination at this visit.    Hepatitis B vaccination  Expressed indifference regarding Hepatitis B vaccination, stating willingness to receive it if it is preventative and covered, as immunity from childhood is likely gone.    Cholesterol concerns  Asked if cholesterol was concerning, noting that the \"good\" cholesterol was a little low and the \"bad\" cholesterol was slightly elevated by seven points. Reported that overall cholesterol numbers were normal and that the \"good\" cholesterol had improved.    Medication history  Reported restarting Lexapro after a period off due to a previous work situation. Stated that Lexapro works well and is being continued for maintenance. Noted experiencing vivid and enjoyable dreams with the 10 mg dose of Lexapro.                 Advance Care Planning    Discussed advance care planning with patient; informed AVS has link to [employer].        7/30/2025   General Health   How would you rate your overall physical health? Good   Feel stress (tense, anxious, or unable to sleep) Not at all         7/30/2025   Nutrition   Three or more servings of calcium each day? Yes   Diet: Gluten-free/reduced   How many servings of fruit and vegetables per day? (!) 2-3   How many sweetened beverages each day? 0-1         7/30/2025   Exercise   Days per week of moderate/strenous exercise 7 days   Average minutes spent exercising at this level 30 min         7/30/2025   Social Factors   Frequency of gathering with friends or relatives Three times a week   Worry food won't last until get money to buy more No   Food not " last or not have enough money for food? No   Do you have housing? (Housing is defined as stable permanent housing and does not include staying outside in a car, in a tent, in an abandoned building, in an overnight shelter, or couch-surfing.) Yes   Are you worried about losing your housing? No   Lack of transportation? No   Unable to get utilities (heat,electricity)? No         7/30/2025   Dental   Dentist two times every year? Yes         Today's PHQ-2 Score:       7/30/2025     1:16 PM   PHQ-2 ( 1999 [employer])   Q1: Little interest or pleasure in doing things 0   Q2: Feeling down, depressed or hopeless 0   PHQ-2 Score 0    Q1: Little interest or pleasure in doing things Not at all   Q2: Feeling down, depressed or hopeless Not at all   PHQ-2 Score 0       Patient-reported           7/30/2025   Substance Use   Alcohol more than 3/day or more than 7/wk No   Do you use any other substances recreationally? No     Social History     Tobacco Use    Smoking status: Never    Smokeless tobacco: Never   Vaping Use    Vaping status: Never Used   Substance Use Topics    Alcohol use: Yes     Comment: Infrequent    Drug use: No          Mammogram Screening - Patient under 40 years of age: Routine Mammogram Screening not recommended.         7/30/2025   STI Screening   New sexual partner(s) since last STI/HIV test? No     History of abnormal Pap smear: No - age 30-64 HPV with reflex Pap every 5 years recommended        Latest Ref Rng & Units 7/28/2022     8:40 AM 2/4/2021    12:56 PM 2/4/2021    12:25 PM   PAP / HPV   PAP  Negative for Intraepithelial Lesion or Malignancy (NILM)      PAP (Historical)    NIL    HPV 16 DNA Negative Negative  Negative     HPV 18 DNA Negative Negative  Negative     Other HR HPV Negative Negative  Negative             7/30/2025   Contraception/Family Planning   Questions about contraception or family planning No   What are your periods like? Regular        Reviewed and updated as needed this visit by  "Provider                         ROS: 10 point ROS neg other than the symptoms noted above in the HPI.       Objective    Exam  /76   Pulse 84   Temp 98.6  F (37  C) (Temporal)   Resp 18   Ht 1.62 m (5' 3.78\")   Wt 102 kg (224 lb 12.8 oz)   LMP 07/12/2025 (Exact Date)   SpO2 97%   Breastfeeding No   BMI 38.85 kg/m     Estimated body mass index is 38.85 kg/m  as calculated from the following:    Height as of this encounter: 1.62 m (5' 3.78\").    Weight as of this encounter: 102 kg (224 lb 12.8 oz).    Physical Exam  GENERAL: healthy, alert and no distress  EYES: Eyes grossly normal to inspection, PERRL and conjunctivae and sclerae normal  Neck: No visible JVD or lymphadenopathy   RESP: symmetrical rise in chest   CV: No peripheral edema notable   MS: no gross musculoskeletal defects noted  SKIN: no suspicious lesions or rashes  PSYCH: mentation appears normal, affect normal/bright          Signed Electronically by: CHIARA MASON MD    "

## 2025-07-31 NOTE — PATIENT INSTRUCTIONS
"Patient Education   Preventive Care Advice   This is general advice we often give to help people stay healthy. Your care team may have specific advice just for you. Please talk to your care team about your own preventive care needs.  Lifestyle  Exercise at least 150 minutes each week (30 minutes a day, 5 days a week).  Do muscle strengthening activities 2 days a week. These help control your weight and prevent disease.  No smoking.  Wear sunscreen to prevent skin cancer.  Take time with family and friends.  Have your home tested for radon every 2 to 5 years. Radon is a colorless, odorless gas that can harm your lungs. To learn more, go to [email or web address] and search for \"Radon in Homes.\"  Keep guns unloaded and locked up in a safe place like a safe or gun vault, or, use a gun lock and hide the keys. Always lock away bullets separately. To learn more, visit [place].gov and search for \"safe gun storage.\"  Nutrition  Eat 5 or more servings of fruits and vegetables each day.  Try wheat bread, brown rice and whole grain pasta (instead of white bread, rice, and pasta).  Get enough calcium and vitamin D. Check the label on foods and aim for 100% of the RDA (recommended daily allowance).  Regular exams  Have a dental exam and cleaning every 6 months.  Older adults: Ask your care team how often to have memory testing.  See your health care team every year to talk about:  Any changes in your health.  Any medicines your care team has prescribed.  Preventive care, family planning, and ways to prevent chronic diseases.  Shots (vaccines)   HPV shots (up to age 26), if you've never had them before.  Hepatitis B shots (up to age 59), if you've never had them before.  COVID-19 shot: Get this shot when it's due.  Flu shot: Get a flu shot every year.  Tetanus shot: Get a tetanus shot every 10 years.  Pneumococcal, hepatitis A, and RSV shots: Ask your care team if you need these based on your risk.  Shingles shot (for age 50 and " up).  General health tests  Diabetes screening:  Starting at age 35, Get screened for diabetes at least every 3 years.  If you are younger than age 35, ask your care team if you should be screened for diabetes.  Cholesterol test: At age 39, start having a cholesterol test every 5 years, or more often if advised.  Bone density scan (DEXA): At age 50, ask your care team if you should have this scan for osteoporosis (brittle bones).  Hepatitis C: Get tested at least once in your life.  Abdominal aortic aneurysm screening: Talk to your doctor about having this screening if you:  Have ever smoked; and  Are biologically male; and  Are between the ages of 65 and 75.  STIs (sexually transmitted infections)  Before age 24: Ask your care team if you should be screened for STIs.  After age 24: Get screened for STIs if you're at risk. You are at risk for STIs (including HIV) if:  You are sexually active with more than one person.  You don't use condoms every time.  You or a partner was diagnosed with a sexually transmitted infection.  If you are at risk for HIV, ask about PrEP medicine to prevent HIV.  Get tested for HIV at least once in your life, whether you are at risk for HIV or not.  Cancer screening tests  Cervical cancer screening: If you have a cervix, begin getting regular cervical cancer screening tests at age 21. Most people who have regular screenings with normal results can stop after age 65. Talk about this with your provider.  Breast cancer scan (mammogram): If you've ever had breasts, begin having regular mammograms starting at age 40. This is a scan to check for breast cancer.  Colon cancer screening: It is important to start screening for colon cancer at age 45.  Have a colonoscopy test every 10 years (or more often if you're at risk) Or, ask your provider about stool tests like a FIT test every year or Cologuard test every 3 years.  To learn more about your testing options, visit:  [email or web address]/[email or web address].  For help making a decision, visit: [email or web address].[email or web address]/[email or web address].  Prostate cancer screening test: If you have a prostate and are age 55 to 69, ask your provider if you would benefit from a yearly prostate cancer screening test.  Lung cancer screening: If you are a current or former smoker age 50 to 80, ask your care team if ongoing lung cancer screenings are right for you.    For informational purposes only. Not to replace the advice of your health care provider. Copyright   2023 [facility]. All rights reserved. Clinically reviewed by the [facility] Transitions Program. CMOSIS nv [id number] - REV 6/25.

## 2025-08-24 ENCOUNTER — MYC REFILL (OUTPATIENT)
Dept: FAMILY MEDICINE | Facility: CLINIC | Age: 40
End: 2025-08-24
Payer: COMMERCIAL

## 2025-08-24 DIAGNOSIS — F41.1 GAD (GENERALIZED ANXIETY DISORDER): ICD-10-CM

## 2025-08-25 RX ORDER — ESCITALOPRAM OXALATE 10 MG/1
10 TABLET ORAL DAILY
Qty: 90 TABLET | Refills: 3 | Status: SHIPPED | OUTPATIENT
Start: 2025-08-25

## (undated) DEVICE — LINEN GOWN X4 5410

## (undated) DEVICE — SUCTION MANIFOLD DORNOCH ULTRA CART UL-CL500

## (undated) DEVICE — ESU GROUND PAD UNIVERSAL W/O CORD

## (undated) DEVICE — PREP POVIDONE IODINE SCRUB 7.5% 120ML

## (undated) DEVICE — WIPES FOLEY CARE SURESTEP PROVON DFC100

## (undated) DEVICE — GLOVE PROTEXIS W/NEU-THERA 6.5  2D73TE65

## (undated) DEVICE — SU PLAIN 0 TIE 54" S104H

## (undated) DEVICE — SOL NACL 0.9% IRRIG 1000ML BOTTLE 2F7124

## (undated) DEVICE — CATH TRAY FOLEY SURESTEP 16FR WDRAIN BAG STLK LATEX A300316A

## (undated) DEVICE — GLOVE PROTEXIS BLUE W/NEU-THERA 7.0  2D73EB70

## (undated) DEVICE — ESU PENCIL W/HOLSTER E2350H

## (undated) DEVICE — TUBING SMOKE EVAC 1CMX3M SEA3710

## (undated) DEVICE — LIGHT HANDLE X2

## (undated) DEVICE — SOL WATER IRRIG 1000ML BOTTLE 2F7114

## (undated) DEVICE — PREP CHLORAPREP 26ML TINTED ORANGE  260815

## (undated) DEVICE — SU MONOCRYL 0 CTB-1 36" YB946

## (undated) DEVICE — SUCTION TIP POOLE K770

## (undated) DEVICE — ESU PENCIL W/SMOKE EVAC NEPTUNE STRYKER 0703-046-000

## (undated) DEVICE — PACK C-SECTION LF PL15OTA83B

## (undated) DEVICE — PREP POVIDONE IODINE SOLUTION 10% 120ML

## (undated) DEVICE — GOWN XLG DISP 9545

## (undated) DEVICE — LINEN ORTHO PACK 5446

## (undated) DEVICE — LINEN TOWEL PACK X5 5464

## (undated) RX ORDER — OXYTOCIN/0.9 % SODIUM CHLORIDE 30/500 ML
PLASTIC BAG, INJECTION (ML) INTRAVENOUS
Status: DISPENSED
Start: 2018-03-30

## (undated) RX ORDER — KETOROLAC TROMETHAMINE 30 MG/ML
INJECTION, SOLUTION INTRAMUSCULAR; INTRAVENOUS
Status: DISPENSED
Start: 2018-03-30

## (undated) RX ORDER — ONDANSETRON 2 MG/ML
INJECTION INTRAMUSCULAR; INTRAVENOUS
Status: DISPENSED
Start: 2018-03-30